# Patient Record
Sex: MALE | Race: WHITE | NOT HISPANIC OR LATINO | Employment: FULL TIME | URBAN - METROPOLITAN AREA
[De-identification: names, ages, dates, MRNs, and addresses within clinical notes are randomized per-mention and may not be internally consistent; named-entity substitution may affect disease eponyms.]

---

## 2017-04-12 ENCOUNTER — HISTORICAL (OUTPATIENT)
Dept: ADMINISTRATIVE | Facility: HOSPITAL | Age: 47
End: 2017-04-12

## 2017-05-23 ENCOUNTER — OFFICE VISIT (OUTPATIENT)
Dept: SURGERY | Facility: CLINIC | Age: 47
End: 2017-05-23
Payer: COMMERCIAL

## 2017-05-23 VITALS
SYSTOLIC BLOOD PRESSURE: 110 MMHG | WEIGHT: 166 LBS | HEIGHT: 69 IN | BODY MASS INDEX: 24.59 KG/M2 | DIASTOLIC BLOOD PRESSURE: 68 MMHG

## 2017-05-23 DIAGNOSIS — K40.90 RIGHT INGUINAL HERNIA: Primary | ICD-10-CM

## 2017-05-23 PROCEDURE — 99024 POSTOP FOLLOW-UP VISIT: CPT | Mod: ,,, | Performed by: SURGERY

## 2017-05-23 NOTE — LETTER
May 23, 2017      Aleena Mulligan MD  [address] [facility] [address]           [facility]  [address]  [address]  [place] [address]  Phone: [phone]  Fax: [phone]          Patient: Kevin Ca   MR Number: [id number]   YOB: 1970   Date of Visit: 5/23/2017       Dear Dr. Aleena Mulligan:    Thank you for referring Kevin Ca to me for evaluation. Attached you will find relevant portions of my assessment and plan of care.    If you have questions, please do not hesitate to call me. I look forward to following Kevin Ca along with you.    Sincerely,    Amos Easley III, MD    Enclosure  CC:  No Recipients    If you would like to receive this communication electronically, please contact [email or web address] or [phone] to request more information on [employer] Link access.    For providers and/or their staff who would like to refer a patient to [facility], please contact us through our one-stop-shop provider referral line, [facility], at [phone].    If you feel you have received this communication in error or would no longer like to receive these types of communications, please e-mail [email or web address]

## 2017-05-23 NOTE — PROGRESS NOTES
Subjective:       Patient ID: Kevin Ca is a 46 y.o. male.    Chief Complaint: Follow-up (SW/P Right Inguinal Hernia Repair 4/12/2017)      HPI:  Kevin Ca is here for post-op visit after right inguinal hernia repair on 4/12/17. Patient has no systemic complaints. Post operative pain is under control.  Tolerating diet, no nausea/vomiting.  Having normal bowel movements.        Objective:      Physical Exam   Constitutional: He is oriented to person, place, and time. He appears well-developed and well-nourished. He is cooperative. No distress.   Cardiovascular: Normal rate.    Pulmonary/Chest: Effort normal.   Abdominal: Soft. He exhibits no distension. There is no tenderness. There is no rebound and no guarding. No hernia (no evidence of recurrence ).   Neurological: He is alert and oriented to person, place, and time.   Skin:   Incisions are clean, dry and intact  There is no evidence of infection, hematoma or seroma    Nursing note and vitals reviewed.      Assessment/Plan:   Right inguinal hernia  Comments:  Doing well.  No evidence of recurrence.  RTC PRN.  May return to work with no restrictions.         Return if symptoms worsen or fail to improve.

## 2019-07-05 DIAGNOSIS — M25.561 RIGHT KNEE PAIN, UNSPECIFIED CHRONICITY: Primary | ICD-10-CM

## 2019-07-08 ENCOUNTER — OFFICE VISIT (OUTPATIENT)
Dept: ORTHOPEDICS | Facility: CLINIC | Age: 49
End: 2019-07-08
Payer: COMMERCIAL

## 2019-07-08 ENCOUNTER — HOSPITAL ENCOUNTER (OUTPATIENT)
Dept: RADIOLOGY | Facility: HOSPITAL | Age: 49
Discharge: HOME | End: 2019-07-08
Attending: ORTHOPAEDIC SURGERY
Payer: COMMERCIAL

## 2019-07-08 VITALS
HEART RATE: 88 BPM | SYSTOLIC BLOOD PRESSURE: 146 MMHG | WEIGHT: 166 LBS | DIASTOLIC BLOOD PRESSURE: 79 MMHG | HEIGHT: 69 IN | BODY MASS INDEX: 24.59 KG/M2

## 2019-07-08 DIAGNOSIS — M25.561 RIGHT KNEE PAIN, UNSPECIFIED CHRONICITY: ICD-10-CM

## 2019-07-08 DIAGNOSIS — S83.281A ACUTE LATERAL MENISCAL TEAR, RIGHT, INITIAL ENCOUNTER: Primary | ICD-10-CM

## 2019-07-08 PROCEDURE — 73562 XR KNEE ORTHO RIGHT WITH FLEXION: ICD-10-PCS | Mod: 26,59,LT, | Performed by: RADIOLOGY

## 2019-07-08 PROCEDURE — 73564 X-RAY EXAM KNEE 4 OR MORE: CPT | Mod: 26,RT,, | Performed by: RADIOLOGY

## 2019-07-08 PROCEDURE — 99244 PR OFFICE CONSULTATION,LEVEL IV: ICD-10-PCS | Mod: 57,S$GLB,, | Performed by: ORTHOPAEDIC SURGERY

## 2019-07-08 PROCEDURE — 73562 X-RAY EXAM OF KNEE 3: CPT | Mod: 26,59,LT, | Performed by: RADIOLOGY

## 2019-07-08 PROCEDURE — 73564 XR KNEE ORTHO RIGHT WITH FLEXION: ICD-10-PCS | Mod: 26,RT,, | Performed by: RADIOLOGY

## 2019-07-08 PROCEDURE — 73562 X-RAY EXAM OF KNEE 3: CPT | Mod: TC,PN,RT

## 2019-07-08 PROCEDURE — 99999 PR PBB SHADOW E&M-EST. PATIENT-LVL III: CPT | Mod: PBBFAC,,, | Performed by: ORTHOPAEDIC SURGERY

## 2019-07-08 PROCEDURE — 99244 OFF/OP CNSLTJ NEW/EST MOD 40: CPT | Mod: 57,S$GLB,, | Performed by: ORTHOPAEDIC SURGERY

## 2019-07-08 PROCEDURE — 99999 PR PBB SHADOW E&M-EST. PATIENT-LVL III: ICD-10-PCS | Mod: PBBFAC,,, | Performed by: ORTHOPAEDIC SURGERY

## 2019-07-08 NOTE — LETTER
July 8, 2019      Elliott Robert MD  [address]  [address]  [place] [address]           [facility]  [place] [address]  Phone: [phone]          Patient: Kevin Ca   MR Number: [id number]   YOB: 1970   Date of Visit: 7/8/2019       Dear Dr. Elliott Robert:    Thank you for referring Kevin Ca to me for evaluation. Attached you will find relevant portions of my assessment and plan of care.    If you have questions, please do not hesitate to call me. I look forward to following Kevin Ca along with you.    Sincerely,    Ganesh Berry MD    Enclosure  CC:  No Recipients    If you would like to receive this communication electronically, please contact [email or web address] or [phone] to request more information on [email or web address] Link access.    For providers and/or their staff who would like to refer a patient to [facility], please contact us through our one-stop-shop provider referral line, [facility], at [phone].    If you feel you have received this communication in error or would no longer like to receive these types of communications, please e-mail [email or web address]

## 2019-07-08 NOTE — H&P (VIEW-ONLY)
History reviewed. No pertinent past medical history.    Past Surgical History:   Procedure Laterality Date    HAND TENDON SURGERY Left 1992    HERNIA REPAIR Right 04/12/2017    with mesh       No current outpatient medications on file.     No current facility-administered medications for this visit.        Review of patient's allergies indicates:  No Known Allergies    Family History   Problem Relation Age of Onset    Cancer Maternal Grandfather        Social History     Socioeconomic History    Marital status:      Spouse name: Not on file    Number of children: Not on file    Years of education: Not on file    Highest education level: Not on file   Occupational History    Not on file   Social Needs    Financial resource strain: Not on file    Food insecurity:     Worry: Not on file     Inability: Not on file    Transportation needs:     Medical: Not on file     Non-medical: Not on file   Tobacco Use    Smoking status: Never Smoker   Substance and Sexual Activity    Alcohol use: No    Drug use: No    Sexual activity: Not on file   Lifestyle    Physical activity:     Days per week: Not on file     Minutes per session: Not on file    Stress: Not on file   Relationships    Social connections:     Talks on phone: Not on file     Gets together: Not on file     Attends Protestant service: Not on file     Active member of club or organization: Not on file     Attends meetings of clubs or organizations: Not on file     Relationship status: Not on file   Other Topics Concern    Not on file   Social History Narrative    Not on file       Chief Complaint:   Chief Complaint   Patient presents with    Knee Pain     right knee pain       History of present illness:  This is a 48-year-old male seen in consultation for Dr. Robert.  Patient is a  and has had right lateral knee pain since January.  Does not recall a specific injury.  Symptoms are stable in moderate.  Pain with squatting and  twisting.  Knee catches and feels like it will give way at times. He had an MRI done which showed a lateral meniscal tear consistent with his symptoms.      Review of Systems:    Constitution: Negative for chills, fever, and sweats.  Negative for unexplained weight loss.    HENT:  Negative for headaches and blurry vision.    Cardiovascular:Negative for chest pain or irregular heart beat. Negative for hypertension.    Respiratory:  Negative for cough and shortness of breath.    Gastrointestinal: Negative for abdominal pain, heartburn, melena, nausea, and vomitting.    Genitourinary:  Negative bladder incontinence and dysuria.    Musculoskeletal:  See HPI    Neurological: Negative for numbness.    Psychiatric/Behavioral: Negative for depression.  The patient is not nervous/anxious.      Endocrine: Negative for polyuria    Hematologic/Lymphatic: Negative for bleeding problem.  Does not bruise/bleed easily.    Skin: Negative for poor would healing and rash      Physical Examination:    Vital Signs:    Vitals:    07/08/19 1258   BP: (!) 146/79   Pulse: 88       Body mass index is 24.51 kg/m².    This a well-developed, well nourished patient in no acute distress.  They are alert and oriented and cooperative to examination.  Pt. walks without an antalgic gait.      Examination of the right knee shows no rashes or erythema. There are no masses ecchymosis or effusion. Patient has full range of motion from 0-130°. Patient is moderately tender to palpation over lateral joint line and nontender to palpation over the medial joint line. Patient has a - Lachman exam, - anterior drawer exam, and - posterior drawer exam.  Positive lateral Apley exam. Knee is stable to varus and valgus stress. 5 out of 5 motor strength. Palpable distal pulses. Intact light touch sensation. Negative Patellofemoral crepitus    Examination of the left knee shows no rashes or erythema. There are no masses ecchymosis or effusion. Patient has full range of  motion from 0-130°. Patient is nontender to palpation over lateral joint line and nontender to palpation over the medial joint line. Patient has a - Lachman exam, - anterior drawer exam, and - posterior drawer exam. - Jony's exam. Knee is stable to varus and valgus stress. 5 out of 5 motor strength. Palpable distal pulses. Intact light touch sensation. Negative Patellofemoral crepitus        X-rays:  X-rays of the right knee are ordered and reviewed which show very minimal arthritic change particular the right knee.    [facility] the right [facility]:  Lateral meniscal tear that is horizontal in nature of the posterior horn, body and anterior horn.  Small parameniscal cyst and moderate lateral compartment arthritis.     Assessment::  Right lateral meniscal tear    Plan:  I reviewed the findings with him today.  We talked about treatment options for his meniscal tear.  Plan is for right arthroscopic partial meniscectomy versus repair with PRP augmentation.  Risks, benefits, and alternatives to the procedure were explained to the patient including but not limited to damage to nerves, arteries, blood vessels, bones, tendons, ligaments, stiffness, instability, infection, DVT, PE, as well as general anesthetic complications including seizure, stroke, heart attack and even death. The patient understood these risks and wished to proceed and signed the informed consent.       This note was created using [employer] voice recognition software that occasionally misinterpreted phrases or words.    Consult note is delivered via Epic messaging service.

## 2019-07-08 NOTE — PROGRESS NOTES
History reviewed. No pertinent past medical history.    Past Surgical History:   Procedure Laterality Date    HAND TENDON SURGERY Left 1992    HERNIA REPAIR Right 04/12/2017    with mesh       No current outpatient medications on file.     No current facility-administered medications for this visit.        Review of patient's allergies indicates:  No Known Allergies    Family History   Problem Relation Age of Onset    Cancer Maternal Grandfather        Social History     Socioeconomic History    Marital status:      Spouse name: Not on file    Number of children: Not on file    Years of education: Not on file    Highest education level: Not on file   Occupational History    Not on file   Social Needs    Financial resource strain: Not on file    Food insecurity:     Worry: Not on file     Inability: Not on file    Transportation needs:     Medical: Not on file     Non-medical: Not on file   Tobacco Use    Smoking status: Never Smoker   Substance and Sexual Activity    Alcohol use: No    Drug use: No    Sexual activity: Not on file   Lifestyle    Physical activity:     Days per week: Not on file     Minutes per session: Not on file    Stress: Not on file   Relationships    Social connections:     Talks on phone: Not on file     Gets together: Not on file     Attends Islam service: Not on file     Active member of club or organization: Not on file     Attends meetings of clubs or organizations: Not on file     Relationship status: Not on file   Other Topics Concern    Not on file   Social History Narrative    Not on file       Chief Complaint:   Chief Complaint   Patient presents with    Knee Pain     right knee pain       History of present illness:  This is a 48-year-old male seen in consultation for Dr. Robert.  Patient is a  and has had right lateral knee pain since January.  Does not recall a specific injury.  Symptoms are stable in moderate.  Pain with squatting and  twisting.  Knee catches and feels like it will give way at times. He had an MRI done which showed a lateral meniscal tear consistent with his symptoms.      Review of Systems:    Constitution: Negative for chills, fever, and sweats.  Negative for unexplained weight loss.    HENT:  Negative for headaches and blurry vision.    Cardiovascular:Negative for chest pain or irregular heart beat. Negative for hypertension.    Respiratory:  Negative for cough and shortness of breath.    Gastrointestinal: Negative for abdominal pain, heartburn, melena, nausea, and vomitting.    Genitourinary:  Negative bladder incontinence and dysuria.    Musculoskeletal:  See HPI    Neurological: Negative for numbness.    Psychiatric/Behavioral: Negative for depression.  The patient is not nervous/anxious.      Endocrine: Negative for polyuria    Hematologic/Lymphatic: Negative for bleeding problem.  Does not bruise/bleed easily.    Skin: Negative for poor would healing and rash      Physical Examination:    Vital Signs:    Vitals:    07/08/19 1258   BP: (!) 146/79   Pulse: 88       Body mass index is 24.51 kg/m².    This a well-developed, well nourished patient in no acute distress.  They are alert and oriented and cooperative to examination.  Pt. walks without an antalgic gait.      Examination of the right knee shows no rashes or erythema. There are no masses ecchymosis or effusion. Patient has full range of motion from 0-130°. Patient is moderately tender to palpation over lateral joint line and nontender to palpation over the medial joint line. Patient has a - Lachman exam, - anterior drawer exam, and - posterior drawer exam.  Positive lateral Apley exam. Knee is stable to varus and valgus stress. 5 out of 5 motor strength. Palpable distal pulses. Intact light touch sensation. Negative Patellofemoral crepitus    Examination of the left knee shows no rashes or erythema. There are no masses ecchymosis or effusion. Patient has full range of  motion from 0-130°. Patient is nontender to palpation over lateral joint line and nontender to palpation over the medial joint line. Patient has a - Lachman exam, - anterior drawer exam, and - posterior drawer exam. - Jony's exam. Knee is stable to varus and valgus stress. 5 out of 5 motor strength. Palpable distal pulses. Intact light touch sensation. Negative Patellofemoral crepitus        X-rays:  X-rays of the right knee are ordered and reviewed which show very minimal arthritic change particular the right knee.    [facility] of the right [facility] from [facility]:  Lateral meniscal tear that is horizontal in nature of the posterior horn, body and anterior horn.  Small parameniscal cyst and moderate lateral compartment arthritis.     Assessment::  Right lateral meniscal tear    Plan:  I reviewed the findings with him today.  We talked about treatment options for his meniscal tear.  Plan is for right arthroscopic partial meniscectomy versus repair with PRP augmentation.  Risks, benefits, and alternatives to the procedure were explained to the patient including but not limited to damage to nerves, arteries, blood vessels, bones, tendons, ligaments, stiffness, instability, infection, DVT, PE, as well as general anesthetic complications including seizure, stroke, heart attack and even death. The patient understood these risks and wished to proceed and signed the informed consent.       This note was created using [employer] voice recognition software that occasionally misinterpreted phrases or words.    Consult note is delivered via Epic messaging service.

## 2019-07-09 RX ORDER — CEFAZOLIN SODIUM 2 G/50ML
2 SOLUTION INTRAVENOUS
Status: CANCELLED | OUTPATIENT
Start: 2019-07-09

## 2019-07-09 RX ORDER — MUPIROCIN 20 MG/G
OINTMENT TOPICAL
Status: CANCELLED | OUTPATIENT
Start: 2019-07-09

## 2019-07-12 ENCOUNTER — HOSPITAL ENCOUNTER (OUTPATIENT)
Dept: PREADMISSION TESTING | Facility: HOSPITAL | Age: 49
Discharge: HOME | End: 2019-07-12
Attending: ORTHOPAEDIC SURGERY
Payer: COMMERCIAL

## 2019-07-12 VITALS — WEIGHT: 210 LBS | HEIGHT: 69 IN | BODY MASS INDEX: 31.1 KG/M2

## 2019-07-12 DIAGNOSIS — S83.281A ACUTE LATERAL MENISCAL TEAR, RIGHT, INITIAL ENCOUNTER: Primary | ICD-10-CM

## 2019-07-12 DIAGNOSIS — Z01.818 PRE-OP EXAM: ICD-10-CM

## 2019-07-12 LAB
ANION GAP SERPL CALC-SCNC: 10 MMOL/L (ref 8–16)
BASOPHILS # BLD AUTO: 0.02 K/UL (ref 0–0.2)
BASOPHILS NFR BLD: 0.3 % (ref 0–1.9)
BUN SERPL-MCNC: 13 MG/DL (ref 6–20)
CALCIUM SERPL-MCNC: 10.4 MG/DL (ref 8.7–10.5)
CHLORIDE SERPL-SCNC: 104 MMOL/L (ref 95–110)
CO2 SERPL-SCNC: 29 MMOL/L (ref 23–29)
CREAT SERPL-MCNC: 1 MG/DL (ref 0.5–1.4)
DIFFERENTIAL METHOD: ABNORMAL
EOSINOPHIL # BLD AUTO: 0.1 K/UL (ref 0–0.5)
EOSINOPHIL NFR BLD: 1.3 % (ref 0–8)
ERYTHROCYTE [DISTWIDTH] IN BLOOD BY AUTOMATED COUNT: 12.5 % (ref 11.5–14.5)
EST. GFR  (AFRICAN AMERICAN): >60 ML/MIN/1.73 M^2
EST. GFR  (NON AFRICAN AMERICAN): >60 ML/MIN/1.73 M^2
GLUCOSE SERPL-MCNC: 93 MG/DL (ref 70–110)
HCT VFR BLD AUTO: 42.5 % (ref 40–54)
HGB BLD-MCNC: 13.8 G/DL (ref 14–18)
IMM GRANULOCYTES # BLD AUTO: 0.01 K/UL (ref 0–0.04)
LYMPHOCYTES # BLD AUTO: 2.3 K/UL (ref 1–4.8)
LYMPHOCYTES NFR BLD: 38 % (ref 18–48)
MCH RBC QN AUTO: 29.1 PG (ref 27–31)
MCHC RBC AUTO-ENTMCNC: 32.5 G/DL (ref 32–36)
MCV RBC AUTO: 90 FL (ref 82–98)
MONOCYTES # BLD AUTO: 0.6 K/UL (ref 0.3–1)
MONOCYTES NFR BLD: 9 % (ref 4–15)
NEUTROPHILS # BLD AUTO: 3.1 K/UL (ref 1.8–7.7)
NEUTROPHILS NFR BLD: 51.2 % (ref 38–73)
NRBC BLD-RTO: 0 /100 WBC
PLATELET # BLD AUTO: 218 K/UL (ref 150–350)
PMV BLD AUTO: 10.2 FL (ref 9.2–12.9)
POTASSIUM SERPL-SCNC: 5 MMOL/L (ref 3.5–5.1)
RBC # BLD AUTO: 4.74 M/UL (ref 4.6–6.2)
SODIUM SERPL-SCNC: 143 MMOL/L (ref 136–145)
WBC # BLD AUTO: 6.11 K/UL (ref 3.9–12.7)

## 2019-07-12 PROCEDURE — 80048 BASIC METABOLIC PNL TOTAL CA: CPT

## 2019-07-12 PROCEDURE — 93010 ELECTROCARDIOGRAM REPORT: CPT | Mod: ,,, | Performed by: INTERNAL MEDICINE

## 2019-07-12 PROCEDURE — 99900103 DSU ONLY-NO CHARGE-INITIAL HR (STAT)

## 2019-07-12 PROCEDURE — 99900104 DSU ONLY-NO CHARGE-EA ADD'L HR (STAT)

## 2019-07-12 PROCEDURE — 36415 COLL VENOUS BLD VENIPUNCTURE: CPT

## 2019-07-12 PROCEDURE — 85025 COMPLETE CBC W/AUTO DIFF WBC: CPT

## 2019-07-12 PROCEDURE — 93005 ELECTROCARDIOGRAM TRACING: CPT

## 2019-07-12 PROCEDURE — 93010 EKG 12-LEAD: ICD-10-PCS | Mod: ,,, | Performed by: INTERNAL MEDICINE

## 2019-07-12 NOTE — DISCHARGE INSTRUCTIONS
To confirm, Your doctor has instructed you that surgery is scheduled for: 7/16/19   ARTUR  [phone]    Please report to [facility] the morning of surgery. You must check-in and receive a wristband before going to your procedure.    Pre-Op will call the afternoon prior to surgery between 1:00 and 6:00 PM with the final arrival time.  Phone number: [phone]    PLEASE NOTE:  The surgery schedule has many variables which may affect the time of your surgery case.  Family members should be available if your surgery time changes.  Plan to be here the day of your procedure between 4-6 hours.    MEDICATIONS:  TAKE ONLY THESE MEDICATIONS WITH A SMALL SIP OF WATER THE MORNING OF YOUR PROCEDURE:  -0-    DO NOT TAKE THESE MEDICATIONS 5-7 DAYS PRIOR to your procedure or per your surgeon's request: ASPIRIN, ALEVE, ADVIL, IBUPROFEN, FISH OIL VITAMIN E, HERBALS  (May take Tylenol)    ONLY if you are prescribed any types of blood thinners such as:  Aspirin, Coumadin, Plavix, Pradaxa, Xarelto, Aggrenox, Effient, Eliquis, Savasya, Brilinta, or any other, ask your surgeon whether you should stop taking them and how long before surgery you should stop.  You may also need to verify with the prescribing physician if it is ok to stop your medication.      INSTRUCTIONS IMPORTANT!!  · Do not eat or drink anything between midnight and the time of your procedure- this includes gum, mints, and candy.  · Do not smoke or drink alcoholic beverages 24 hours prior to your procedure.  · Shower the night before AND the morning of your procedure with a Chlorhexidine wash such as Hibiclens or Dial antibacterial soap from the neck down.  Do not get it on your face or in your eyes.  You may use your own shampoo and face wash. This helps your skin to be as bacteria free as possible.    · If you wear contact lenses, dentures, hearing aids or glasses, bring a container to put them in during surgery and give to a  family member for safe keeping.  Please leave all jewelry, piercing's and valuables at home.   · DO NOT remove hair from the surgery site.  Do not shave the incision site unless you are given specific instructions to do so.    · ONLY if you have been diagnosed with sleep apnea please bring your C-PAP machine.  · ONLY if you wear home oxygen please bring your portable oxygen tank the day of your procedure.  · ONLY if you have a history of OPEN HEART SURGERY you will need a clearance from your Cardiologist per Anesthesia.      · ONLY for patients requiring bowel prep, written instructions will be given by your doctor's office.  · ONLY if you have a neuro stimulator, please bring the controller with you the morning of surgery  · ONLY if a type and screen test is needed before surgery, please return:  · If your doctor has scheduled you for an overnight stay, bring a small overnight bag with any personal items you need.  · Make arrangements in advance for transportation home by a responsible adult.  It is not safe to drive a vehicle during the 24 hours after anesthesia.      · Visiting hours are 10:00AM to 8:30PM.  For the safety of all patients, visitors under the age of 12 are not allowed above the first floor of the hospital.    · All [facility] facilities and properties are tobacco free.  Smoking is NOT allowed.       If you have any questions about these instructions, call Pre-Op Admit  Nursing at [phone] or the Pre-Op Day Surgery Unit at [phone].

## 2019-07-15 ENCOUNTER — ANESTHESIA EVENT (OUTPATIENT)
Dept: SURGERY | Facility: HOSPITAL | Age: 49
End: 2019-07-15
Payer: COMMERCIAL

## 2019-07-16 ENCOUNTER — ANESTHESIA (OUTPATIENT)
Dept: SURGERY | Facility: HOSPITAL | Age: 49
End: 2019-07-16
Payer: COMMERCIAL

## 2019-07-16 ENCOUNTER — HOSPITAL ENCOUNTER (OUTPATIENT)
Facility: HOSPITAL | Age: 49
Discharge: HOME | End: 2019-07-16
Attending: ORTHOPAEDIC SURGERY | Admitting: ORTHOPAEDIC SURGERY
Payer: COMMERCIAL

## 2019-07-16 VITALS
RESPIRATION RATE: 18 BRPM | DIASTOLIC BLOOD PRESSURE: 79 MMHG | WEIGHT: 210 LBS | OXYGEN SATURATION: 98 % | SYSTOLIC BLOOD PRESSURE: 120 MMHG | TEMPERATURE: 98 F | HEART RATE: 73 BPM | BODY MASS INDEX: 31.1 KG/M2 | HEIGHT: 69 IN

## 2019-07-16 DIAGNOSIS — S83.281A ACUTE LATERAL MENISCAL TEAR, RIGHT, INITIAL ENCOUNTER: ICD-10-CM

## 2019-07-16 PROCEDURE — 99900103 DSU ONLY-NO CHARGE-INITIAL HR (STAT): Performed by: ORTHOPAEDIC SURGERY

## 2019-07-16 PROCEDURE — D9220A PRA ANESTHESIA: Mod: CRNA,,, | Performed by: NURSE ANESTHETIST, CERTIFIED REGISTERED

## 2019-07-16 PROCEDURE — 71000015 HC POSTOP RECOV 1ST HR: Performed by: ORTHOPAEDIC SURGERY

## 2019-07-16 PROCEDURE — 29882 PR KNEE SCOPE,MED OR LAT MENIS REPAIR: ICD-10-PCS | Mod: RT,,, | Performed by: ORTHOPAEDIC SURGERY

## 2019-07-16 PROCEDURE — D9220A PRA ANESTHESIA: Mod: ANES,,, | Performed by: ANESTHESIOLOGY

## 2019-07-16 PROCEDURE — 25000003 PHARM REV CODE 250: Performed by: ANESTHESIOLOGY

## 2019-07-16 PROCEDURE — 63600175 PHARM REV CODE 636 W HCPCS: Performed by: ORTHOPAEDIC SURGERY

## 2019-07-16 PROCEDURE — 71000033 HC RECOVERY, INTIAL HOUR: Performed by: ORTHOPAEDIC SURGERY

## 2019-07-16 PROCEDURE — 27201423 OPTIME MED/SURG SUP & DEVICES STERILE SUPPLY: Performed by: ORTHOPAEDIC SURGERY

## 2019-07-16 PROCEDURE — 27200651 HC AIRWAY, LMA: Performed by: NURSE ANESTHETIST, CERTIFIED REGISTERED

## 2019-07-16 PROCEDURE — 63600175 PHARM REV CODE 636 W HCPCS: Performed by: ANESTHESIOLOGY

## 2019-07-16 PROCEDURE — 29882 ARTHRS KNE SRG MNISC RPR M/L: CPT | Mod: RT,,, | Performed by: ORTHOPAEDIC SURGERY

## 2019-07-16 PROCEDURE — 71000039 HC RECOVERY, EACH ADD'L HOUR: Performed by: ORTHOPAEDIC SURGERY

## 2019-07-16 PROCEDURE — 37000009 HC ANESTHESIA EA ADD 15 MINS: Performed by: ORTHOPAEDIC SURGERY

## 2019-07-16 PROCEDURE — 63600175 PHARM REV CODE 636 W HCPCS: Performed by: NURSE ANESTHETIST, CERTIFIED REGISTERED

## 2019-07-16 PROCEDURE — 37000008 HC ANESTHESIA 1ST 15 MINUTES: Performed by: ORTHOPAEDIC SURGERY

## 2019-07-16 PROCEDURE — 36000710: Performed by: ORTHOPAEDIC SURGERY

## 2019-07-16 PROCEDURE — D9220A PRA ANESTHESIA: ICD-10-PCS | Mod: ANES,,, | Performed by: ANESTHESIOLOGY

## 2019-07-16 PROCEDURE — 25000003 PHARM REV CODE 250: Performed by: ORTHOPAEDIC SURGERY

## 2019-07-16 PROCEDURE — 36000711: Performed by: ORTHOPAEDIC SURGERY

## 2019-07-16 PROCEDURE — 99900104 DSU ONLY-NO CHARGE-EA ADD'L HR (STAT): Performed by: ORTHOPAEDIC SURGERY

## 2019-07-16 PROCEDURE — D9220A PRA ANESTHESIA: ICD-10-PCS | Mod: CRNA,,, | Performed by: NURSE ANESTHETIST, CERTIFIED REGISTERED

## 2019-07-16 PROCEDURE — C1713 ANCHOR/SCREW BN/BN,TIS/BN: HCPCS | Performed by: ORTHOPAEDIC SURGERY

## 2019-07-16 DEVICE — SYS NOVOSTITCH PRO MENIS 2-0: Type: IMPLANTABLE DEVICE | Site: KNEE | Status: FUNCTIONAL

## 2019-07-16 DEVICE — CARTRIDGE NOVOSTITCH PLUS 2-0: Type: IMPLANTABLE DEVICE | Site: KNEE | Status: FUNCTIONAL

## 2019-07-16 RX ORDER — FENTANYL CITRATE 50 UG/ML
INJECTION, SOLUTION INTRAMUSCULAR; INTRAVENOUS
Status: DISCONTINUED | OUTPATIENT
Start: 2019-07-16 | End: 2019-07-16

## 2019-07-16 RX ORDER — LIDOCAINE HCL/PF 100 MG/5ML
SYRINGE (ML) INTRAVENOUS
Status: DISCONTINUED | OUTPATIENT
Start: 2019-07-16 | End: 2019-07-16

## 2019-07-16 RX ORDER — DEXAMETHASONE SODIUM PHOSPHATE 4 MG/ML
INJECTION, SOLUTION INTRA-ARTICULAR; INTRALESIONAL; INTRAMUSCULAR; INTRAVENOUS; SOFT TISSUE
Status: DISCONTINUED | OUTPATIENT
Start: 2019-07-16 | End: 2019-07-16

## 2019-07-16 RX ORDER — OXYCODONE HYDROCHLORIDE 5 MG/1
5 TABLET ORAL ONCE AS NEEDED
Status: COMPLETED | OUTPATIENT
Start: 2019-07-16 | End: 2019-07-16

## 2019-07-16 RX ORDER — MIDAZOLAM HYDROCHLORIDE 1 MG/ML
INJECTION, SOLUTION INTRAMUSCULAR; INTRAVENOUS
Status: DISCONTINUED | OUTPATIENT
Start: 2019-07-16 | End: 2019-07-16

## 2019-07-16 RX ORDER — ONDANSETRON HYDROCHLORIDE 2 MG/ML
INJECTION, SOLUTION INTRAMUSCULAR; INTRAVENOUS
Status: DISCONTINUED | OUTPATIENT
Start: 2019-07-16 | End: 2019-07-16

## 2019-07-16 RX ORDER — HYDROCODONE BITARTRATE AND ACETAMINOPHEN 5; 325 MG/1; MG/1
1 TABLET ORAL EVERY 6 HOURS PRN
Qty: 20 TABLET | Refills: 0 | Status: SHIPPED | OUTPATIENT
Start: 2019-07-16 | End: 2019-07-26

## 2019-07-16 RX ORDER — LIDOCAINE HYDROCHLORIDE 10 MG/ML
1 INJECTION, SOLUTION EPIDURAL; INFILTRATION; INTRACAUDAL; PERINEURAL ONCE
Status: DISCONTINUED | OUTPATIENT
Start: 2019-07-16 | End: 2019-07-16 | Stop reason: HOSPADM

## 2019-07-16 RX ORDER — OXYCODONE HYDROCHLORIDE 5 MG/1
5 TABLET ORAL
Status: DISCONTINUED | OUTPATIENT
Start: 2019-07-16 | End: 2019-07-16 | Stop reason: HOSPADM

## 2019-07-16 RX ORDER — SODIUM CHLORIDE, SODIUM LACTATE, POTASSIUM CHLORIDE, CALCIUM CHLORIDE 600; 310; 30; 20 MG/100ML; MG/100ML; MG/100ML; MG/100ML
INJECTION, SOLUTION INTRAVENOUS CONTINUOUS
Status: DISCONTINUED | OUTPATIENT
Start: 2019-07-16 | End: 2019-07-16 | Stop reason: HOSPADM

## 2019-07-16 RX ORDER — HYDROMORPHONE HYDROCHLORIDE 2 MG/ML
0.2 INJECTION, SOLUTION INTRAMUSCULAR; INTRAVENOUS; SUBCUTANEOUS EVERY 5 MIN PRN
Status: COMPLETED | OUTPATIENT
Start: 2019-07-16 | End: 2019-07-16

## 2019-07-16 RX ORDER — FENTANYL CITRATE 50 UG/ML
25 INJECTION, SOLUTION INTRAMUSCULAR; INTRAVENOUS EVERY 5 MIN PRN
Status: COMPLETED | OUTPATIENT
Start: 2019-07-16 | End: 2019-07-16

## 2019-07-16 RX ORDER — PROPOFOL 10 MG/ML
VIAL (ML) INTRAVENOUS
Status: DISCONTINUED | OUTPATIENT
Start: 2019-07-16 | End: 2019-07-16

## 2019-07-16 RX ORDER — BUPIVACAINE HCL/EPINEPHRINE 0.25-.0005
VIAL (ML) INJECTION
Status: DISCONTINUED | OUTPATIENT
Start: 2019-07-16 | End: 2019-07-16 | Stop reason: HOSPADM

## 2019-07-16 RX ORDER — PHENYLEPHRINE HYDROCHLORIDE 10 MG/ML
INJECTION INTRAVENOUS
Status: DISCONTINUED | OUTPATIENT
Start: 2019-07-16 | End: 2019-07-16

## 2019-07-16 RX ORDER — MUPIROCIN 20 MG/G
OINTMENT TOPICAL
Status: DISCONTINUED | OUTPATIENT
Start: 2019-07-16 | End: 2019-07-16 | Stop reason: HOSPADM

## 2019-07-16 RX ORDER — CEFAZOLIN SODIUM 2 G/50ML
2 SOLUTION INTRAVENOUS
Status: COMPLETED | OUTPATIENT
Start: 2019-07-16 | End: 2019-07-16

## 2019-07-16 RX ORDER — KETOROLAC TROMETHAMINE 30 MG/ML
30 INJECTION, SOLUTION INTRAMUSCULAR; INTRAVENOUS ONCE
Status: COMPLETED | OUTPATIENT
Start: 2019-07-16 | End: 2019-07-16

## 2019-07-16 RX ORDER — ACETAMINOPHEN 10 MG/ML
INJECTION, SOLUTION INTRAVENOUS
Status: DISCONTINUED | OUTPATIENT
Start: 2019-07-16 | End: 2019-07-16

## 2019-07-16 RX ADMIN — FENTANYL CITRATE 25 MCG: 50 INJECTION INTRAMUSCULAR; INTRAVENOUS at 08:07

## 2019-07-16 RX ADMIN — ACETAMINOPHEN 1000 MG: 10 INJECTION, SOLUTION INTRAVENOUS at 07:07

## 2019-07-16 RX ADMIN — LIDOCAINE HYDROCHLORIDE 100 MG: 20 INJECTION, SOLUTION INTRAVENOUS at 07:07

## 2019-07-16 RX ADMIN — HYDROMORPHONE HYDROCHLORIDE 0.2 MG: 2 INJECTION, SOLUTION INTRAMUSCULAR; INTRAVENOUS; SUBCUTANEOUS at 08:07

## 2019-07-16 RX ADMIN — FENTANYL CITRATE 50 MCG: 50 INJECTION, SOLUTION INTRAMUSCULAR; INTRAVENOUS at 07:07

## 2019-07-16 RX ADMIN — HYDROMORPHONE HYDROCHLORIDE 0.2 MG: 2 INJECTION, SOLUTION INTRAMUSCULAR; INTRAVENOUS; SUBCUTANEOUS at 09:07

## 2019-07-16 RX ADMIN — CEFAZOLIN SODIUM 2 G: 2 SOLUTION INTRAVENOUS at 07:07

## 2019-07-16 RX ADMIN — SODIUM CHLORIDE, SODIUM LACTATE, POTASSIUM CHLORIDE, AND CALCIUM CHLORIDE: .6; .31; .03; .02 INJECTION, SOLUTION INTRAVENOUS at 06:07

## 2019-07-16 RX ADMIN — PHENYLEPHRINE HYDROCHLORIDE 100 MCG: 10 INJECTION INTRAVENOUS at 07:07

## 2019-07-16 RX ADMIN — PROPOFOL 150 MG: 10 INJECTION, EMULSION INTRAVENOUS at 07:07

## 2019-07-16 RX ADMIN — SODIUM CHLORIDE, SODIUM LACTATE, POTASSIUM CHLORIDE, AND CALCIUM CHLORIDE: .6; .31; .03; .02 INJECTION, SOLUTION INTRAVENOUS at 08:07

## 2019-07-16 RX ADMIN — KETOROLAC TROMETHAMINE 30 MG: 30 INJECTION, SOLUTION INTRAMUSCULAR at 08:07

## 2019-07-16 RX ADMIN — DEXAMETHASONE SODIUM PHOSPHATE 4 MG: 4 INJECTION, SOLUTION INTRAMUSCULAR; INTRAVENOUS at 07:07

## 2019-07-16 RX ADMIN — MIDAZOLAM 2 MG: 1 INJECTION INTRAMUSCULAR; INTRAVENOUS at 07:07

## 2019-07-16 RX ADMIN — ONDANSETRON 4 MG: 2 INJECTION, SOLUTION INTRAMUSCULAR; INTRAVENOUS at 07:07

## 2019-07-16 RX ADMIN — OXYCODONE HYDROCHLORIDE 5 MG: 5 TABLET ORAL at 09:07

## 2019-07-16 RX ADMIN — OXYCODONE HYDROCHLORIDE 5 MG: 5 TABLET ORAL at 08:07

## 2019-07-16 NOTE — ANESTHESIA PREPROCEDURE EVALUATION
07/16/2019  Kevin Ca is a 48 y.o., male.    Anesthesia Evaluation    I have reviewed the Patient Summary Reports.    I have reviewed the Nursing Notes.      Review of Systems  Anesthesia Hx:  No problems with previous Anesthesia    Cardiovascular:  Cardiovascular Normal     Pulmonary:  Pulmonary Normal        Physical Exam  General:  Well nourished    Airway/Jaw/Neck:  Airway Findings: Mouth Opening: Normal Tongue: Normal  General Airway Assessment: Adult  Mallampati: III  TM Distance: Normal, at least 6 cm  Jaw/Neck Findings:  Neck ROM: Normal ROM     Eyes/Ears/Nose:  Eyes/Ears/Nose Findings:    Dental:  Dental Findings: In tact   Chest/Lungs:  Chest/Lungs Findings: Normal Respiratory Rate     Heart/Vascular:  Heart Findings: Rate: Normal  Rhythm: Regular Rhythm        Mental Status:  Mental Status Findings:  Cooperative, Alert and Oriented         Anesthesia Plan  Type of Anesthesia, risks & benefits discussed:  Anesthesia Type:  general  Patient's Preference: General  Intra-op Monitoring Plan: standard ASA monitors  Intra-op Monitoring Plan Comments:   Post Op Pain Control Plan: multimodal analgesia and per primary service following discharge from PACU  Post Op Pain Control Plan Comments:   Induction:   IV  Beta Blocker:  Patient is not currently on a Beta-Blocker (No further documentation required).       Informed Consent: Patient understands risks and agrees with Anesthesia plan.  Questions answered. Anesthesia consent signed with patient.  ASA Score: 1     Day of Surgery Review of History & Physical:    H&P update referred to the surgeon.         Ready For Surgery From Anesthesia Perspective.

## 2019-07-16 NOTE — DISCHARGE INSTRUCTIONS
1.Diet: Ice chips, clear liquids, and then diet as tolerated. Drink plenty of liquids.  2.Ice the area at least three times a day (20 minutes per session).  3.Elevate the extremity above the level of the heart to help reduce swelling.  4.Pain medication can be taken every four to six hours as needed. It is helpful to take pain medication prior to physical therapy.  5.Any activity that requires precise thinking or accuracy should be avoided for a minimum of 72 hours after surgery and while on narcotic pain medication. This includes operating machinery and/or driving a vehicle.  6.All sutures/staples will be removed approximately 14 days from the time of surgery. Leave steri-strips (skin tapes) in place until sutures are removed.  7. If skin glue is used instead of stitches, do not apply ointments or solutions to the incision. Keep the incision dry. The skin glue will peel off in 3-4 weeks.  8. Change dressing on the first post-op day. Use gauze for the first 3 days, then start using Band-Aids over the incision sites. Use an Ace Wrap for 2 weeks unless instructed otherwise.  9. All casts, splints, braces, slings, crutches, abduction pillows, etc... Are to be worn as instructed.Use crutchesas ordered  10. Keep the incision dry for 10-14 days. A waterproof dressing (purchase at [employer], [employer], etc) can be used to shower. No bath, pool, hot tub until instructed.  11. Call [phone] with any questions or concerns.      General Information:    1.  Do not drink alcoholic beverages including beer for 24 hours or as long as you are on pain medication..  2.  Do not drive a motor vehicle, operate machinery or power tools, or signs legal papers for 24 hours or as long as you are on pain medication.   3.  You may experience light-headedness, dizziness, and sleepiness following surgery. Please do not stay alone. A responsible adult should be with you for this 24 hour period.  4.  Go home and rest.    5. Progress slowly to a normal  diet unless instructed.  Otherwise, begin with liquids such as soft drinks, then soup and crackers working up to solid foods. Drink plenty of nonalcoholic fluids.  6.  Certain anesthetics and pain medications produce nausea and vomiting in certain       individuals. If nausea becomes a problem at home, call you doctor.    7. A nurse will be calling you sometime after surgery. Do not be alarmed. This is our way of finding out how you are doing.    8. Several times every hour while you are awake, take 2-3 deep breaths and cough. If you had stomach surgery hold a pillow or rolled towel firmly against your stomach before you cough. This will help with any pain the cough might cause.  9. Several times every hour while you are awake, pump and flex your feet 5-6 times and do foot circles. This will help prevent blood clots.    10.Call your doctor for severe pain, bleeding, fever, or signs or symptoms of infection (pain, swelling, redness, foul odor, drainage).    Post op instructions for prevention of DVT  What is deep vein thrombosis?  Deep vein thrombosis (DVT) is the medical term for blood clots in the deep veins of the leg.  These blood clots can be dangerous.  A DVT can block a blood vessel and keep blood from getting where it needs to go.  Another problem is that the clot can travel to other parts of the body such as the lungs.  A clot that travels to the lungs is called a pulmonary embolus (PE) and can cause serious problems with breathing which can lead to death.  Am I at risk for DVT/PE?  If you are not very active, you are at risk of DVT.  Anyone confined to bed, sitting for long periods of time, recovering from surgery, etc. increases the risk of DVT.  Other risk factors are cancer diagnosis, certain medications, estrogen replacement in any form,older age, obesity, pregnancy, smoking, history of clotting disorders, and dehydration.  How will I know if I have a DVT?   Swelling in the lower leg   Pain   Warmth,  redness, hardness or bulging of the vein  If you have any of these symptoms, call your doctors office right away.  Some people will not have any symptoms until the clot moves to the lungs.  What are the symptoms of a PE?   Panting, shortness of breath, or trouble breathing   Sharp, knife-like chest pain when you breathe   Coughing or coughing up blood   Rapid heartbeat  If you have any of these symptoms or get worse quickly, call 911 for emergency treatment.  How can I prevent a DVT?   Avoid long periods of inactivity and dont cross your legs--get up and walk around every hour or so.   Stay active--walking after surgery is highly encouraged.  This means you should get out of the house and walk in the neighborhood.  Going up and down stairs will not impair healing (unless advised against such activity by your doctor).     Drink plenty of noncaffeinated, nonalcoholic fluids each day to prevent dehydration.   Wear special support stockings, if they have been advised by your doctor.   If you travel, stop at least once an hour and walk around.   Avoid smoking (assistance with stopping is available through your healthcare provider)  Always notify your doctor if you are not able to follow the post operative instructions that are given to you at the time of discharge.  It may be necessary to prescribe one of the medications available to prevent DVT.    Discharge Instructions: After Your Surgery/Procedure  Youve just had surgery. During surgery you were given medicine called anesthesia to keep you relaxed and free of pain. After surgery you may have some pain or nausea. This is common. Here are some tips for feeling better and getting well after surgery.     Stay on schedule with your medication.   Going home  Your doctor or nurse will show you how to take care of yourself when you go home. He or she will also answer your questions. Have an adult family member or friend drive you home.      For your safety we  "recommend these precaution for the first 24 hours after your procedure:  · Do not drive or use heavy equipment.  · Do not make important decisions or sign legal papers.  · Do not drink alcohol.  · Have someone stay with you, if needed. He or she can watch for problems and help keep you safe.  · Your concentration, balance, coordination, and judgement may be impaired for many hours after anesthesia.  Use caution when ambulating or standing up.     · You may feel weak and "washed out" after anesthesia and surgery.      Subtle residual effects of general anesthesia or sedation with regional / local anesthesia can last more than 24 hours.  Rest for the remainder of the day or longer if your Doctor/Surgeon has advised you to do so.  Although you may feel normal within the first 24 hours, your reflexes and mental ability may be impaired without you realizing it.  You may feel dizzy, lightheaded or sleepy for 24 hours or longer.      Be sure to go to all follow-up visits with your doctor. And rest after your surgery for as long as your doctor tells you to.  Coping with pain  If you have pain after surgery, pain medicine will help you feel better. Take it as told, before pain becomes severe. Also, ask your doctor or pharmacist about other ways to control pain. This might be with heat, ice, or relaxation. And follow any other instructions your surgeon or nurse gives you.  Tips for taking pain medicine  To get the best relief possible, remember these points:  · Pain medicines can upset your stomach. Taking them with a little food may help.  · Most pain relievers taken by mouth need at least 20 to 30 minutes to start to work.  · Taking medicine on a schedule can help you remember to take it. Try to time your medicine so that you can take it before starting an activity. This might be before you get dressed, go for a walk, or sit down for dinner.  · Constipation is a common side effect of pain medicines. Call your doctor before " taking any medicines such as laxatives or stool softeners to help ease constipation. Also ask if you should skip any foods. Drinking lots of fluids and eating foods such as fruits and vegetables that are high in fiber can also help. Remember, do not take laxatives unless your surgeon has prescribed them.  · Drinking alcohol and taking pain medicine can cause dizziness and slow your breathing. It can even be deadly. Do not drink alcohol while taking pain medicine.  · Pain medicine can make you react more slowly to things. Do not drive or run machinery while taking pain medicine.  Your health care provider may tell you to take acetaminophen to help ease your pain. Ask him or her how much you are supposed to take each day. Acetaminophen or other pain relievers may interact with your prescription medicines or other over-the-counter (OTC) drugs. Some prescription medicines have acetaminophen and other ingredients. Using both prescription and OTC acetaminophen for pain can cause you to overdose. Read the labels on your OTC medicines with care. This will help you to clearly know the list of ingredients, how much to take, and any warnings. It may also help you not take too much acetaminophen. If you have questions or do not understand the information, ask your pharmacist or health care provider to explain it to you before you take the OTC medicine.  Managing nausea  Some people have an upset stomach after surgery. This is often because of anesthesia, pain, or pain medicine, or the stress of surgery. These tips will help you handle nausea and eat healthy foods as you get better. If you were on a special food plan before surgery, ask your doctor if you should follow it while you get better. These tips may help:  · Do not push yourself to eat. Your body will tell you when to eat and how much.  · Start off with clear liquids and soup. They are easier to digest.  · Next try semi-solid foods, such as mashed potatoes, applesauce,  and gelatin, as you feel ready.  · Slowly move to solid foods. Dont eat fatty, rich, or spicy foods at first.  · Do not force yourself to have 3 large meals a day. Instead eat smaller amounts more often.  · Take pain medicines with a small amount of solid food, such as crackers or toast, to avoid nausea.     Call your surgeon if  · You still have pain an hour after taking medicine. The medicine may not be strong enough.  · You feel too sleepy, dizzy, or groggy. The medicine may be too strong.  · You have side effects like nausea, vomiting, or skin changes, such as rash, itching, or hives.       If you have obstructive sleep apnea  You were given anesthesia medicine during surgery to keep you comfortable and free of pain. After surgery, you may have more apnea spells because of this medicine and other medicines you were given. The spells may last longer than usual.   At home:  · Keep using the continuous positive airway pressure (CPAP) device when you sleep. Unless your health care provider tells you not to, use it when you sleep, day or night. CPAP is a common device used to treat obstructive sleep apnea.  · Talk with your provider before taking any pain medicine, muscle relaxants, or sedatives. Your provider will tell you about the possible dangers of taking these medicines.  © 1961-8656 [employer]. [address] [place] [address], [place] [address]. All rights reserved. This information is not intended as a substitute for professional medical care. Always follow your healthcare professional's instructions.

## 2019-07-16 NOTE — OP NOTE
[facility]  Orthopedic Surgery  Operative Note    SUMMARY     Date of Procedure: 7/16/2019     Procedure: Procedure(s) (LRB):  ARTHROSCOPY, KNEE (Right)  REPAIR, Lateral MENISCUS, KNEE (Right)   With 3ml PRP augmentation from the Arthrex Helio system    Surgeon(s) and Role:     * Ganesh Berry MD - Primary    Assistant: Corky White    Pre-Operative Diagnosis: Acute lateral meniscal tear, right, initial encounter [S83.281A]    Post-Operative Diagnosis: Post-Op Diagnosis Codes:     * Acute lateral meniscal tear, right, initial encounter [S83.281A]    Anesthesia: General    Diagnostic arthroscopy findings: Diagnostic arthroscopy findings, the patient's medial compartment was thoroughly examined. The patient had no cartilage wear and no distinct meniscal tear. ACL and PCL were both intact with   good tension. Lateral compartment showed grade 2/3 wear of the tibial plateau as well as horizontal tearing of the posterior horn body junction extending to the anterior horn body junction. In the patellofemoral joint, the patient had good central tracking without tilt or chondromalacia    Complications: No    Estimated Blood Loss (EBL): 10ml    Tourniquet Time: 25min at 300mmHg           Implants: * No implants in log *    Specimens:   Specimen (12h ago, onward)    None                  Condition: Good    Disposition: PACU - hemodynamically stable.    Attestation: I was present and scrubbed for the entire procedure.    INDICATIONS FOR THE PROCEDURE:  48-year-old male with a history of right knee pain.  Patient had an MRI which showed a horizontal tear of the lateral meniscus.  After discussion with the patient, the patient wished to proceed with the procedure listed above.    PROCEDURE IN DETAIL: Risks, benefits and alternatives of the procedure were   explained to the patient including, but not limited to damage to nerves,   arteries, blood vessels. Also explained risk of infection, DVT, PE,  continued pain due to arthritis,  as well as   anesthetic complications including seizure, stroke, heart attack and death. They  understood this and signed informed consent. The patient's Right knee was marked prior to coming to the Operating Room. Once there a formal   timeout was done in which correct patient, procedure and op site were all   correctly identified and confirmed by the entire operating team. Ancef 2 g was   given prior to surgical incision. Laryngeal mask anesthesia was induced. The   patient's Right lower extremity was prepped and draped in normal sterile fashion.   Leg was then exsanguinated with a tourniquet and tourniquet was inflated up   300 mmHg. Standard inferior lateral portal was then made. A spinal needle was   used to localize an inferior medial portal and this was made under direct   arthroscopic visualization. Diagnostic arthroscopy was then performed with the   findings listed above. Shaver was used to remove redundant fat pad and   Synovium within the notch.  Shaver was used to clean up the meniscal tear.  Some of the inferior leaflet of the body was debrided with the shaver.  We then used the shaver as well as a meniscal rasp to debride the tear within the horizontal component. We then made a more medial all medial portal offer little better angle on the tear and used Ceterix device to pass a hay bale stitch around the body posterior horn junction.  This was then arthroscopically tied and cut. We then used the [employer] outside in meniscal kit with spinal needles to place another hay bale stitch from underneath the meniscus to the top of the meniscus and then passed a 0 PDS and tied this along the lateral capsule.  We then placed the spinal needle into the meniscal tissue. We then injected the PRP into the meniscal tissue around the tear and then on top of the tear. We did all this well the water was turned off so that it would not dilute out. The knee was then capped and 90°  of flexion for a few minutes to let the clot stick.    Proceeded with closing. All   excess water was removed from the knee joint. Portals were closed using   nylons. Portal was then injected with 0.25% Marcaine with epinephrine. Sterile   dressing was then applied. They were then extubated and awakened and transferred   from the Operating Room to the Recovery Room in stable condition.     Postop course is for an arthroscopic lateral meniscal repair.

## 2019-07-16 NOTE — INTERVAL H&P NOTE
The patient has been examined and the H&P has been reviewed:    I concur with the findings and no changes have occurred since H&P was written.    Anesthesia/Surgery risks, benefits and alternative options discussed and understood by patient/family.          Active Hospital Problems    Diagnosis  POA    Acute lateral meniscal tear, right, initial encounter [S86.675A]  Yes      Resolved Hospital Problems   No resolved problems to display.

## 2019-07-16 NOTE — DISCHARGE SUMMARY
"[facility]  Discharge Note  Short Stay    Admit Date: 7/16/2019    Discharge Date and Time: 7/16/2019    Attending Physician: Ganesh Berry MD     Discharge Provider: Ganesh Berry    Diagnoses:  Active Hospital Problems    Diagnosis  POA    *Acute lateral meniscal tear, right, initial encounter [S83.281A]  Yes      Resolved Hospital Problems   No resolved problems to display.       Discharged Condition: good    Hospital Course: Patient was admitted for an outpatient procedure and tolerated the procedure well with no complications.    Final Diagnoses: Same as principal problem.    Disposition: Home or Self Care    Follow up/Patient Instructions:    Medications:  Reconciled Home Medications:      Medication List      START taking these medications    HYDROcodone-acetaminophen 5-325 mg per tablet  Commonly known as:  NORCO  Take 1 tablet by mouth every 6 (six) hours as needed for Pain.          Discharge Procedure Orders   CRUTCHES FOR HOME USE     Order Specific Question Answer Comments   Type: Axillary    Height: 5' 9" (1.753 m)    Weight: 95.3 kg (210 lb)    Length of need (1-99 months): 1      Remove dressing in 48 hours     Follow-up Information     Ganesh Berry MD In 2 weeks.    Specialties:  Sports Medicine, Orthopedic Surgery  Contact information:  [address] [place] [address] [facility] [address]  [phone]                   Discharge Procedure Orders (must include Diet, Follow-up, Activity):   Discharge Procedure Orders (must include Diet, Follow-up, Activity)   CRUTCHES FOR HOME USE     Order Specific Question Answer Comments   Type: Axillary    Height: 5' 9" (1.753 m)    Weight: 95.3 kg (210 lb)    Length of need (1-99 months): 1      Remove dressing in 48 hours        "

## 2019-07-16 NOTE — ANESTHESIA POSTPROCEDURE EVALUATION
Anesthesia Post Evaluation    Patient: Kevin Ca    Procedure(s) Performed: Procedure(s) (LRB):  ARTHROSCOPY, KNEE (Right)  REPAIR, MENISCUS, KNEE (Right)    Final Anesthesia Type: general  Patient location during evaluation: PACU  Patient participation: Yes- Able to Participate  Level of consciousness: awake and alert, oriented and awake  Post-procedure vital signs: reviewed and stable  Pain management: adequate  Airway patency: patent  PONV status at discharge: No PONV  Anesthetic complications: no      Cardiovascular status: blood pressure returned to baseline and hemodynamically stable  Respiratory status: unassisted, spontaneous ventilation and room air  Hydration status: euvolemic  Follow-up not needed.          Vitals Value Taken Time   /79 7/16/2019 10:20 AM   Temp 36.7 °C (98 °F) 7/16/2019  9:52 AM   Pulse 73 7/16/2019 10:20 AM   Resp 18 7/16/2019 10:20 AM   SpO2 98 % 7/16/2019 10:20 AM         Event Time     Out of Recovery 09:52:00          Pain/Beti Score: Pain Rating Prior to Med Admin: 9 (7/16/2019  9:45 AM)  Pain Rating Post Med Admin: 5 (7/16/2019  9:45 AM)  Beti Score: 10 (7/16/2019 10:21 AM)

## 2019-07-16 NOTE — PLAN OF CARE
Meets criteria for discharge. Discharged to home with wife and parents. Denies nausea. Tolerating fluids. Denies pain. Understands and demonstrates toe touch weight bearing, transfers and crutch use. Discharge instructions reviewed and printed handout given. Crutches issued and ice pack given. Verbalized understanding.

## 2019-07-16 NOTE — TRANSFER OF CARE
"Anesthesia Transfer of Care Note    Patient: Kevin Ca    Procedure(s) Performed: Procedure(s) (LRB):  ARTHROSCOPY, KNEE (Right)  REPAIR, MENISCUS, KNEE (Right)    Patient location: PACU    Anesthesia Type: general    Transport from OR: Transported from OR on 2-3 L/min O2 by NC with adequate spontaneous ventilation    Post pain: adequate analgesia    Post assessment: no apparent anesthetic complications and tolerated procedure well    Post vital signs: stable    Level of consciousness: awake, alert and oriented    Nausea/Vomiting: no nausea/vomiting    Complications: none    Transfer of care protocol was followed      Last vitals:   Visit Vitals  /73 (BP Location: Left arm, Patient Position: Lying)   Pulse 68   Temp 36.6 °C (97.9 °F) (Skin)   Resp 16   Ht 5' 9" (1.753 m)   Wt 95.3 kg (210 lb)   SpO2 99%   BMI 31.01 kg/m²     "

## 2019-07-16 NOTE — PLAN OF CARE
Pt aaox4, states pain is 5 out 10 and states tolerable, denies nausea and tolerating clear liquids, vss, dressing cdi, family updated, pt released by anesthesia to transfer to phase II, report given to JAYSON Salcido

## 2019-07-16 NOTE — PLAN OF CARE
Pt. Is AAOx4, calm and cooperative.  Denies nausea, no pain at rest, pain 4/10 right knee with standing, walking. Breathing unlabored on room air.  RN reviewed plan of care, perioperative care with pt. And spouse who both voiced understanding.  Questions answered, comfort assured.  Prepared for surgery.

## 2019-07-18 ENCOUNTER — TELEPHONE (OUTPATIENT)
Dept: ORTHOPEDICS | Facility: CLINIC | Age: 49
End: 2019-07-18

## 2019-07-18 NOTE — TELEPHONE ENCOUNTER
Returned call and advised to put waterproof bandages over incision. Pt wife verbalized understanding.

## 2019-07-18 NOTE — TELEPHONE ENCOUNTER
----- Message from Layla Garcia sent at 7/18/2019  2:51 PM CDT -----  Contact: Wife - Brooke  Needs Advice    Reason for call: Wife ask for a call in regards to the patient's steri strips coming off when bandages was removed         Communication Preference: Phone     Additional Information: n/a

## 2019-07-18 NOTE — TELEPHONE ENCOUNTER
----- Message from Macy Sapp sent at 7/18/2019  2:54 PM CDT -----  Contact: Yovana wife  Type: Needs Medical Advice    Who Called:  Yovana  Symptoms (please be specific):  The strips came off when she changed his bandages  Best Call Back Number: [phone]  Additional Information: Pls call Yovana to adv of what should be done

## 2019-07-29 ENCOUNTER — OFFICE VISIT (OUTPATIENT)
Dept: ORTHOPEDICS | Facility: CLINIC | Age: 49
End: 2019-07-29
Payer: COMMERCIAL

## 2019-07-29 VITALS
HEIGHT: 69 IN | DIASTOLIC BLOOD PRESSURE: 72 MMHG | HEART RATE: 76 BPM | SYSTOLIC BLOOD PRESSURE: 147 MMHG | BODY MASS INDEX: 31.1 KG/M2 | WEIGHT: 210 LBS

## 2019-07-29 DIAGNOSIS — S83.281A ACUTE LATERAL MENISCAL TEAR, RIGHT, INITIAL ENCOUNTER: Primary | ICD-10-CM

## 2019-07-29 PROCEDURE — 99024 PR POST-OP FOLLOW-UP VISIT: ICD-10-PCS | Mod: S$GLB,,, | Performed by: ORTHOPAEDIC SURGERY

## 2019-07-29 PROCEDURE — 99999 PR PBB SHADOW E&M-EST. PATIENT-LVL III: CPT | Mod: PBBFAC,,, | Performed by: ORTHOPAEDIC SURGERY

## 2019-07-29 PROCEDURE — 99999 PR PBB SHADOW E&M-EST. PATIENT-LVL III: ICD-10-PCS | Mod: PBBFAC,,, | Performed by: ORTHOPAEDIC SURGERY

## 2019-07-29 PROCEDURE — 99024 POSTOP FOLLOW-UP VISIT: CPT | Mod: S$GLB,,, | Performed by: ORTHOPAEDIC SURGERY

## 2019-07-29 NOTE — PROGRESS NOTES
History reviewed. No pertinent past medical history.    Past Surgical History:   Procedure Laterality Date    ARTHROSCOPY, KNEE Right 7/16/2019    Performed by Ganesh Berry MD at [facility] OR    HAND TENDON SURGERY Left 1992    HERNIA REPAIR Right 04/12/2017    with mesh    REPAIR, MENISCUS, KNEE Right 7/16/2019    Performed by Ganesh Berry MD at [facility] OR       No current outpatient medications on file.     No current facility-administered medications for this visit.        Review of patient's allergies indicates:  No Known Allergies    Family History   Problem Relation Age of Onset    Cancer Maternal Grandfather        Social History     Socioeconomic History    Marital status:      Spouse name: Not on file    Number of children: Not on file    Years of education: Not on file    Highest education level: Not on file   Occupational History    Not on file   Social Needs    Financial resource strain: Not on file    Food insecurity:     Worry: Not on file     Inability: Not on file    Transportation needs:     Medical: Not on file     Non-medical: Not on file   Tobacco Use    Smoking status: Never Smoker    Smokeless tobacco: Never Used   Substance and Sexual Activity    Alcohol use: No    Drug use: No    Sexual activity: Not on file   Lifestyle    Physical activity:     Days per week: Not on file     Minutes per session: Not on file    Stress: Not on file   Relationships    Social connections:     Talks on phone: Not on file     Gets together: Not on file     Attends Voodoo service: Not on file     Active member of club or organization: Not on file     Attends meetings of clubs or organizations: Not on file     Relationship status: Not on file   Other Topics Concern    Not on file   Social History Narrative    Not on file       Chief Complaint:   Chief Complaint   Patient presents with    Knee Pain     R knee s/p scope 7/16/19        Date of surgery:  July 16,  2019    History of present illness:  48-year-old male who underwent right arthroscopic lateral meniscal repair with PRP augmentation.  Pain is a 2/10.  Compliant with the brace and crutches.      Review of Systems:    Musculoskeletal:  See HPI        Physical Examination:    Vital Signs:    Vitals:    07/29/19 1030   BP: (!) 147/72   Pulse: 76       Body mass index is 31.01 kg/m².    This a well-developed, well nourished patient in no acute distress.  They are alert and oriented and cooperative to examination.  Pt. walks using crutches    Examination of the right knee shows well-healing surgical portals.  No erythema or drainage. No joint effusion.  Range of motion is 0-90.  No calf pain. Negative Homans sign.    X-rays:  None     Assessment::  Status post right arthroscopic lateral meniscal repair with PRP augmentation    Plan:  I reviewed the arthroscopic photos with him today.  We took out the stitches.  We will start some physical therapy for a meniscal repair protocol.  Continued use the brace and crutches or cane.  Follow up in 4 weeks.    This note was created using [email or web address] voice recognition software that occasionally misinterpreted phrases or words.

## 2019-08-26 ENCOUNTER — OFFICE VISIT (OUTPATIENT)
Dept: ORTHOPEDICS | Facility: CLINIC | Age: 49
End: 2019-08-26
Payer: COMMERCIAL

## 2019-08-26 VITALS
DIASTOLIC BLOOD PRESSURE: 72 MMHG | BODY MASS INDEX: 31.1 KG/M2 | WEIGHT: 210 LBS | SYSTOLIC BLOOD PRESSURE: 154 MMHG | HEART RATE: 70 BPM | HEIGHT: 69 IN

## 2019-08-26 DIAGNOSIS — S83.281D ACUTE LATERAL MENISCAL TEAR, RIGHT, SUBSEQUENT ENCOUNTER: Primary | ICD-10-CM

## 2019-08-26 PROCEDURE — 99999 PR PBB SHADOW E&M-EST. PATIENT-LVL III: ICD-10-PCS | Mod: PBBFAC,,, | Performed by: ORTHOPAEDIC SURGERY

## 2019-08-26 PROCEDURE — 99024 PR POST-OP FOLLOW-UP VISIT: ICD-10-PCS | Mod: S$GLB,,, | Performed by: ORTHOPAEDIC SURGERY

## 2019-08-26 PROCEDURE — 99999 PR PBB SHADOW E&M-EST. PATIENT-LVL III: CPT | Mod: PBBFAC,,, | Performed by: ORTHOPAEDIC SURGERY

## 2019-08-26 PROCEDURE — 99024 POSTOP FOLLOW-UP VISIT: CPT | Mod: S$GLB,,, | Performed by: ORTHOPAEDIC SURGERY

## 2019-08-26 NOTE — PROGRESS NOTES
History reviewed. No pertinent past medical history.    Past Surgical History:   Procedure Laterality Date    ARTHROSCOPY, KNEE Right 7/16/2019    Performed by Ganesh Berry MD at [facility] OR    HAND TENDON SURGERY Left 1992    HERNIA REPAIR Right 04/12/2017    with mesh    REPAIR, MENISCUS, KNEE Right 7/16/2019    Performed by Ganesh Berry MD at [facility] OR       No current outpatient medications on file.     No current facility-administered medications for this visit.        Review of patient's allergies indicates:  No Known Allergies    Family History   Problem Relation Age of Onset    Cancer Maternal Grandfather        Social History     Socioeconomic History    Marital status:      Spouse name: Not on file    Number of children: Not on file    Years of education: Not on file    Highest education level: Not on file   Occupational History    Not on file   Social Needs    Financial resource strain: Not on file    Food insecurity:     Worry: Not on file     Inability: Not on file    Transportation needs:     Medical: Not on file     Non-medical: Not on file   Tobacco Use    Smoking status: Never Smoker    Smokeless tobacco: Never Used   Substance and Sexual Activity    Alcohol use: No    Drug use: No    Sexual activity: Not on file   Lifestyle    Physical activity:     Days per week: Not on file     Minutes per session: Not on file    Stress: Not on file   Relationships    Social connections:     Talks on phone: Not on file     Gets together: Not on file     Attends Orthodox service: Not on file     Active member of club or organization: Not on file     Attends meetings of clubs or organizations: Not on file     Relationship status: Not on file   Other Topics Concern    Not on file   Social History Narrative    Not on file       Chief Complaint:   Chief Complaint   Patient presents with    Knee Pain     R knee 4wk f/u       Date of surgery:  July 16, 2019    History of  present illness:  48-year-old male who underwent right arthroscopic lateral meniscal repair with PRP augmentation.  Pain is a 0/10.  Compliant with the brace and using a cane      Review of Systems:    Musculoskeletal:  See HPI        Physical Examination:    Vital Signs:    There were no vitals filed for this visit.    Body mass index is 31.01 kg/m².    This a well-developed, well nourished patient in no acute distress.  They are alert and oriented and cooperative to examination.  Pt. walks using crutches    Examination of the right knee shows healed surgical portals.  No erythema or drainage. No joint effusion.  Range of motion is 0-110.  No calf pain. Negative Homans sign.    X-rays:  None     Assessment::  Status post right arthroscopic lateral meniscal repair with PRP augmentation    Plan:  Continue with the physical therapy for a meniscal repair protocol.  We will transition from the range-of-motion brace to just a simple hinged.  Okay to stop a cane or crutch.  Follow up in 6 weeks.    This note was created using [employer] voice recognition software that occasionally misinterpreted phrases or words.

## 2019-10-07 ENCOUNTER — OFFICE VISIT (OUTPATIENT)
Dept: ORTHOPEDICS | Facility: CLINIC | Age: 49
End: 2019-10-07
Payer: COMMERCIAL

## 2019-10-07 VITALS — BODY MASS INDEX: 31.1 KG/M2 | RESPIRATION RATE: 19 BRPM | HEIGHT: 69 IN | WEIGHT: 210 LBS

## 2019-10-07 DIAGNOSIS — S83.281D ACUTE LATERAL MENISCAL TEAR, RIGHT, SUBSEQUENT ENCOUNTER: Primary | ICD-10-CM

## 2019-10-07 PROCEDURE — 99024 PR POST-OP FOLLOW-UP VISIT: ICD-10-PCS | Mod: S$GLB,,, | Performed by: ORTHOPAEDIC SURGERY

## 2019-10-07 PROCEDURE — 99999 PR PBB SHADOW E&M-EST. PATIENT-LVL III: CPT | Mod: PBBFAC,,, | Performed by: ORTHOPAEDIC SURGERY

## 2019-10-07 PROCEDURE — 99024 POSTOP FOLLOW-UP VISIT: CPT | Mod: S$GLB,,, | Performed by: ORTHOPAEDIC SURGERY

## 2019-10-07 PROCEDURE — 99999 PR PBB SHADOW E&M-EST. PATIENT-LVL III: ICD-10-PCS | Mod: PBBFAC,,, | Performed by: ORTHOPAEDIC SURGERY

## 2019-10-07 NOTE — PROGRESS NOTES
History reviewed. No pertinent past medical history.    Past Surgical History:   Procedure Laterality Date    ARTHROSCOPY OF KNEE Right 7/16/2019    Procedure: ARTHROSCOPY, KNEE;  Surgeon: Ganesh Berry MD;  Location: [facility] OR;  Service: Orthopedics;  Laterality: Right;    HAND TENDON SURGERY Left 1992    HERNIA REPAIR Right 04/12/2017    with mesh    REPAIR OF MENISCUS OF KNEE Right 7/16/2019    Procedure: REPAIR, MENISCUS, KNEE;  Surgeon: Ganesh Berry MD;  Location: [facility] OR;  Service: Orthopedics;  Laterality: Right;       No current outpatient medications on file.     No current facility-administered medications for this visit.        Review of patient's allergies indicates:  No Known Allergies    Family History   Problem Relation Age of Onset    Cancer Maternal Grandfather        Social History     Socioeconomic History    Marital status:      Spouse name: Not on file    Number of children: Not on file    Years of education: Not on file    Highest education level: Not on file   Occupational History    Not on file   Social Needs    Financial resource strain: Not on file    Food insecurity:     Worry: Not on file     Inability: Not on file    Transportation needs:     Medical: Not on file     Non-medical: Not on file   Tobacco Use    Smoking status: Never Smoker    Smokeless tobacco: Never Used   Substance and Sexual Activity    Alcohol use: No    Drug use: No    Sexual activity: Not on file   Lifestyle    Physical activity:     Days per week: Not on file     Minutes per session: Not on file    Stress: Not on file   Relationships    Social connections:     Talks on phone: Not on file     Gets together: Not on file     Attends Jew service: Not on file     Active member of club or organization: Not on file     Attends meetings of clubs or organizations: Not on file     Relationship status: Not on file   Other Topics Concern    Not on file   Social History  Narrative    Not on file       Chief Complaint:   Chief Complaint   Patient presents with    Knee Pain     right leg 6week follow up       Date of surgery:  July 16, 2019    History of present illness:  48-year-old male who underwent right arthroscopic lateral meniscal repair with PRP augmentation.  Pain is a 0/10.  Compliant with the brace and physical therapy.      Review of Systems:    Musculoskeletal:  See HPI        Physical Examination:    Vital Signs:    Vitals:    10/07/19 0944   Resp: 19       Body mass index is 31.01 kg/m².    This a well-developed, well nourished patient in no acute distress.  They are alert and oriented and cooperative to examination.  Pt. walks using crutches    Examination of the right knee shows healed surgical portals.  No erythema or drainage. No joint effusion.  Range of motion is 0-130.  No calf pain. Negative Homans sign.    X-rays:  None     Assessment::  Status post right arthroscopic lateral meniscal repair with PRP augmentation    Plan:  Continue with the physical therapy for a meniscal repair protocol.  Continue to progress with protocol in the activity.  I will see him back in 2 months.  Hopefully we can release him back to work at that point.    This note was created using [employer] voice recognition software that occasionally misinterpreted phrases or words.

## 2019-12-02 ENCOUNTER — OFFICE VISIT (OUTPATIENT)
Dept: ORTHOPEDICS | Facility: CLINIC | Age: 49
End: 2019-12-02
Payer: COMMERCIAL

## 2019-12-02 VITALS
SYSTOLIC BLOOD PRESSURE: 164 MMHG | HEART RATE: 68 BPM | DIASTOLIC BLOOD PRESSURE: 82 MMHG | BODY MASS INDEX: 31.1 KG/M2 | WEIGHT: 210 LBS | HEIGHT: 69 IN

## 2019-12-02 DIAGNOSIS — S83.281D ACUTE LATERAL MENISCAL TEAR, RIGHT, SUBSEQUENT ENCOUNTER: Primary | ICD-10-CM

## 2019-12-02 PROCEDURE — 3008F BODY MASS INDEX DOCD: CPT | Mod: CPTII,S$GLB,, | Performed by: ORTHOPAEDIC SURGERY

## 2019-12-02 PROCEDURE — 99213 PR OFFICE/OUTPT VISIT, EST, LEVL III, 20-29 MIN: ICD-10-PCS | Mod: S$GLB,,, | Performed by: ORTHOPAEDIC SURGERY

## 2019-12-02 PROCEDURE — 99999 PR PBB SHADOW E&M-EST. PATIENT-LVL III: ICD-10-PCS | Mod: PBBFAC,,, | Performed by: ORTHOPAEDIC SURGERY

## 2019-12-02 PROCEDURE — 99999 PR PBB SHADOW E&M-EST. PATIENT-LVL III: CPT | Mod: PBBFAC,,, | Performed by: ORTHOPAEDIC SURGERY

## 2019-12-02 PROCEDURE — 3008F PR BODY MASS INDEX (BMI) DOCUMENTED: ICD-10-PCS | Mod: CPTII,S$GLB,, | Performed by: ORTHOPAEDIC SURGERY

## 2019-12-02 PROCEDURE — 99213 OFFICE O/P EST LOW 20 MIN: CPT | Mod: S$GLB,,, | Performed by: ORTHOPAEDIC SURGERY

## 2019-12-02 NOTE — PROGRESS NOTES
History reviewed. No pertinent past medical history.    Past Surgical History:   Procedure Laterality Date    ARTHROSCOPY OF KNEE Right 7/16/2019    Procedure: ARTHROSCOPY, KNEE;  Surgeon: Ganesh Berry MD;  Location: [facility] OR;  Service: Orthopedics;  Laterality: Right;    HAND TENDON SURGERY Left 1992    HERNIA REPAIR Right 04/12/2017    with mesh    REPAIR OF MENISCUS OF KNEE Right 7/16/2019    Procedure: REPAIR, MENISCUS, KNEE;  Surgeon: Ganesh Berry MD;  Location: [facility] OR;  Service: Orthopedics;  Laterality: Right;       No current outpatient medications on file.     No current facility-administered medications for this visit.        Review of patient's allergies indicates:  No Known Allergies    Family History   Problem Relation Age of Onset    Cancer Maternal Grandfather        Social History     Socioeconomic History    Marital status:      Spouse name: Not on file    Number of children: Not on file    Years of education: Not on file    Highest education level: Not on file   Occupational History    Not on file   Social Needs    Financial resource strain: Not on file    Food insecurity:     Worry: Not on file     Inability: Not on file    Transportation needs:     Medical: Not on file     Non-medical: Not on file   Tobacco Use    Smoking status: Never Smoker    Smokeless tobacco: Never Used   Substance and Sexual Activity    Alcohol use: No    Drug use: No    Sexual activity: Not on file   Lifestyle    Physical activity:     Days per week: Not on file     Minutes per session: Not on file    Stress: Not on file   Relationships    Social connections:     Talks on phone: Not on file     Gets together: Not on file     Attends Sabianism service: Not on file     Active member of club or organization: Not on file     Attends meetings of clubs or organizations: Not on file     Relationship status: Not on file   Other Topics Concern    Not on file   Social History  Narrative    Not on file       Chief Complaint:   Chief Complaint   Patient presents with    Post-op Evaluation     s/p right knee scope 7/16/19        Date of surgery:  July 16, 2019    History of present illness:  48-year-old male who underwent right arthroscopic lateral meniscal repair with PRP augmentation.  Pain is a 0/10.  Compliant with the physical therapy.  They have initiated some work hardening as well.      Review of Systems:    Musculoskeletal:  See HPI        Physical Examination:    Vital Signs:    Vitals:    12/02/19 0846   BP: (!) 164/82   Pulse: 68       Body mass index is 31.01 kg/m².    This a well-developed, well nourished patient in no acute distress.  They are alert and oriented and cooperative to examination.  Pt. walks using crutches    Examination of the right knee shows healed surgical portals.  No erythema or drainage. No joint effusion.  Range of motion is 0-130.  No calf pain. Negative Homans sign.    X-rays:  None     Assessment::  Status post right arthroscopic lateral meniscal repair with PRP augmentation    Plan:  Continue with the physical therapy for a meniscal repair protocol.  He is also doing a lot of work hardening as well. Continue to progress with protocol in the activity.  I will see him back in 6 weeks.  Hopefully we can release him back to work at that point.    This note was created using [employer] voice recognition software that occasionally misinterpreted phrases or words.

## 2020-01-13 ENCOUNTER — OFFICE VISIT (OUTPATIENT)
Dept: ORTHOPEDICS | Facility: CLINIC | Age: 50
End: 2020-01-13
Payer: COMMERCIAL

## 2020-01-13 VITALS
HEIGHT: 69 IN | HEART RATE: 74 BPM | BODY MASS INDEX: 31.1 KG/M2 | DIASTOLIC BLOOD PRESSURE: 70 MMHG | SYSTOLIC BLOOD PRESSURE: 154 MMHG | WEIGHT: 210 LBS

## 2020-01-13 DIAGNOSIS — S83.281D ACUTE LATERAL MENISCAL TEAR, RIGHT, SUBSEQUENT ENCOUNTER: Primary | ICD-10-CM

## 2020-01-13 PROCEDURE — 99999 PR PBB SHADOW E&M-EST. PATIENT-LVL III: ICD-10-PCS | Mod: PBBFAC,,, | Performed by: ORTHOPAEDIC SURGERY

## 2020-01-13 PROCEDURE — 99999 PR PBB SHADOW E&M-EST. PATIENT-LVL III: CPT | Mod: PBBFAC,,, | Performed by: ORTHOPAEDIC SURGERY

## 2020-01-13 PROCEDURE — 99213 PR OFFICE/OUTPT VISIT, EST, LEVL III, 20-29 MIN: ICD-10-PCS | Mod: S$GLB,,, | Performed by: ORTHOPAEDIC SURGERY

## 2020-01-13 PROCEDURE — 99213 OFFICE O/P EST LOW 20 MIN: CPT | Mod: S$GLB,,, | Performed by: ORTHOPAEDIC SURGERY

## 2020-01-13 PROCEDURE — 3008F PR BODY MASS INDEX (BMI) DOCUMENTED: ICD-10-PCS | Mod: CPTII,S$GLB,, | Performed by: ORTHOPAEDIC SURGERY

## 2020-01-13 PROCEDURE — 3008F BODY MASS INDEX DOCD: CPT | Mod: CPTII,S$GLB,, | Performed by: ORTHOPAEDIC SURGERY

## 2020-01-13 RX ORDER — MULTIVITAMIN
1 TABLET ORAL DAILY
COMMUNITY

## 2020-01-13 NOTE — PROGRESS NOTES
History reviewed. No pertinent past medical history.    Past Surgical History:   Procedure Laterality Date    ARTHROSCOPY OF KNEE Right 7/16/2019    Procedure: ARTHROSCOPY, KNEE;  Surgeon: Ganesh Berry MD;  Location: [facility] OR;  Service: Orthopedics;  Laterality: Right;    HAND TENDON SURGERY Left 1992    HERNIA REPAIR Right 04/12/2017    with mesh    REPAIR OF MENISCUS OF KNEE Right 7/16/2019    Procedure: REPAIR, MENISCUS, KNEE;  Surgeon: Ganesh Berry MD;  Location: [facility] OR;  Service: Orthopedics;  Laterality: Right;       Current Outpatient Medications   Medication Sig    multivitamin (ONE DAILY MULTIVITAMIN) per tablet Take 1 tablet by mouth once daily.     No current facility-administered medications for this visit.        Review of patient's allergies indicates:  No Known Allergies    Family History   Problem Relation Age of Onset    Cancer Maternal Grandfather        Social History     Socioeconomic History    Marital status:      Spouse name: Not on file    Number of children: Not on file    Years of education: Not on file    Highest education level: Not on file   Occupational History    Not on file   Social Needs    Financial resource strain: Not on file    Food insecurity:     Worry: Not on file     Inability: Not on file    Transportation needs:     Medical: Not on file     Non-medical: Not on file   Tobacco Use    Smoking status: Never Smoker    Smokeless tobacco: Never Used   Substance and Sexual Activity    Alcohol use: No    Drug use: No    Sexual activity: Not on file   Lifestyle    Physical activity:     Days per week: Not on file     Minutes per session: Not on file    Stress: Not on file   Relationships    Social connections:     Talks on phone: Not on file     Gets together: Not on file     Attends Cheondoism service: Not on file     Active member of club or organization: Not on file     Attends meetings of clubs or organizations: Not on file      Relationship status: Not on file   Other Topics Concern    Not on file   Social History Narrative    Not on file       Chief Complaint:   Chief Complaint   Patient presents with    Right Knee - Post-op Evaluation       Date of surgery:  July 16, 2019    History of present illness:  49-year-old male who underwent right arthroscopic lateral meniscal repair with PRP augmentation.  Pain is a 0/10.  Compliant with the physical therapy.  They have initiated some work hardening as well. Still having some issues with squatting and stooping.  Does not feel like he can safely perform his duties as a  without restrictions yet.      Review of Systems:    Musculoskeletal:  See HPI        Physical Examination:    Vital Signs:    Vitals:    01/13/20 0824   BP: (!) 154/70   Pulse: 74       Body mass index is 31.01 kg/m².    This a well-developed, well nourished patient in no acute distress.  They are alert and oriented and cooperative to examination.  Pt. walks using crutches    Examination of the right knee shows healed surgical portals.  No erythema or drainage. No joint effusion.  Range of motion is 0-130.  No calf pain. Negative Homans sign.    X-rays:  None     Assessment::  Status post right arthroscopic lateral meniscal repair with PRP augmentation    Plan:  Continue with the physical therapy for a meniscal repair protocol.  He is also doing a lot of work hardening as well. Continue to progress with protocol in the activity.  I will see him back in 6 weeks.  Hopefully we can release him back to work at that point.    This note was created using [email or web address] voice recognition software that occasionally misinterpreted phrases or words.

## 2020-02-24 ENCOUNTER — OFFICE VISIT (OUTPATIENT)
Dept: ORTHOPEDICS | Facility: CLINIC | Age: 50
End: 2020-02-24
Payer: COMMERCIAL

## 2020-02-24 VITALS
WEIGHT: 210 LBS | HEIGHT: 69 IN | DIASTOLIC BLOOD PRESSURE: 83 MMHG | BODY MASS INDEX: 31.1 KG/M2 | HEART RATE: 81 BPM | SYSTOLIC BLOOD PRESSURE: 152 MMHG

## 2020-02-24 DIAGNOSIS — S83.281D ACUTE LATERAL MENISCAL TEAR, RIGHT, SUBSEQUENT ENCOUNTER: Primary | ICD-10-CM

## 2020-02-24 PROCEDURE — 3008F BODY MASS INDEX DOCD: CPT | Mod: CPTII,S$GLB,, | Performed by: ORTHOPAEDIC SURGERY

## 2020-02-24 PROCEDURE — 99999 PR PBB SHADOW E&M-EST. PATIENT-LVL III: CPT | Mod: PBBFAC,,, | Performed by: ORTHOPAEDIC SURGERY

## 2020-02-24 PROCEDURE — 99999 PR PBB SHADOW E&M-EST. PATIENT-LVL III: ICD-10-PCS | Mod: PBBFAC,,, | Performed by: ORTHOPAEDIC SURGERY

## 2020-02-24 PROCEDURE — 99213 PR OFFICE/OUTPT VISIT, EST, LEVL III, 20-29 MIN: ICD-10-PCS | Mod: S$GLB,,, | Performed by: ORTHOPAEDIC SURGERY

## 2020-02-24 PROCEDURE — 99213 OFFICE O/P EST LOW 20 MIN: CPT | Mod: S$GLB,,, | Performed by: ORTHOPAEDIC SURGERY

## 2020-02-24 PROCEDURE — 3008F PR BODY MASS INDEX (BMI) DOCUMENTED: ICD-10-PCS | Mod: CPTII,S$GLB,, | Performed by: ORTHOPAEDIC SURGERY

## 2020-02-24 NOTE — PROGRESS NOTES
History reviewed. No pertinent past medical history.    Past Surgical History:   Procedure Laterality Date    ARTHROSCOPY OF KNEE Right 7/16/2019    Procedure: ARTHROSCOPY, KNEE;  Surgeon: Ganesh Berry MD;  Location: [facility] OR;  Service: Orthopedics;  Laterality: Right;    HAND TENDON SURGERY Left 1992    HERNIA REPAIR Right 04/12/2017    with mesh    REPAIR OF MENISCUS OF KNEE Right 7/16/2019    Procedure: REPAIR, MENISCUS, KNEE;  Surgeon: Ganesh Berry MD;  Location: [facility] OR;  Service: Orthopedics;  Laterality: Right;       Current Outpatient Medications   Medication Sig    multivitamin (ONE DAILY MULTIVITAMIN) per tablet Take 1 tablet by mouth once daily.     No current facility-administered medications for this visit.        Review of patient's allergies indicates:  No Known Allergies    Family History   Problem Relation Age of Onset    Cancer Maternal Grandfather        Social History     Socioeconomic History    Marital status:      Spouse name: Not on file    Number of children: Not on file    Years of education: Not on file    Highest education level: Not on file   Occupational History    Not on file   Social Needs    Financial resource strain: Not on file    Food insecurity:     Worry: Not on file     Inability: Not on file    Transportation needs:     Medical: Not on file     Non-medical: Not on file   Tobacco Use    Smoking status: Never Smoker    Smokeless tobacco: Never Used   Substance and Sexual Activity    Alcohol use: No    Drug use: No    Sexual activity: Not on file   Lifestyle    Physical activity:     Days per week: Not on file     Minutes per session: Not on file    Stress: Not on file   Relationships    Social connections:     Talks on phone: Not on file     Gets together: Not on file     Attends Latter-day service: Not on file     Active member of club or organization: Not on file     Attends meetings of clubs or organizations: Not on file      Relationship status: Not on file   Other Topics Concern    Not on file   Social History Narrative    Not on file       Chief Complaint:   Chief Complaint   Patient presents with    Right Knee - Post-op Evaluation       Date of surgery:  July 16, 2019    History of present illness:  49-year-old male who underwent right arthroscopic lateral meniscal repair with PRP augmentation.  He just finished physical therapy.  He rates his pain is a 2/10.  A little achy at times as he does little more.      Review of Systems:    Musculoskeletal:  See HPI        Physical Examination:    Vital Signs:    There were no vitals filed for this visit.    Body mass index is 31.01 kg/m².    This a well-developed, well nourished patient in no acute distress.  They are alert and oriented and cooperative to examination.  Pt. walks using crutches    Examination of the right knee shows healed surgical portals.  No erythema or drainage. No joint effusion.  Range of motion is 0-130.  No calf pain. Negative Homans sign.    X-rays:  None     Assessment::  Status post right arthroscopic lateral meniscal repair with PRP augmentation    Plan:  We will release him back to work.  I will check him back in 4 months at the 1 year jaspreet.  Follow up sooner if needed.    This note was created using [employer] voice recognition software that occasionally misinterpreted phrases or words.

## 2020-04-27 ENCOUNTER — TELEPHONE (OUTPATIENT)
Dept: ORTHOPEDICS | Facility: CLINIC | Age: 50
End: 2020-04-27

## 2020-04-27 NOTE — TELEPHONE ENCOUNTER
----- Message from Libra Myers sent at 4/27/2020 10:19 AM CDT -----  Contact: SELF [phone]   Patient want to know can his spouse ancelmo  his paper work due to him working during the week.  Patient contact # [phone]

## 2020-08-11 ENCOUNTER — OFFICE VISIT (OUTPATIENT)
Dept: FAMILY MEDICINE | Facility: CLINIC | Age: 50
End: 2020-08-11
Payer: COMMERCIAL

## 2020-08-11 VITALS
TEMPERATURE: 99 F | DIASTOLIC BLOOD PRESSURE: 74 MMHG | HEART RATE: 84 BPM | SYSTOLIC BLOOD PRESSURE: 122 MMHG | BODY MASS INDEX: 32.49 KG/M2 | WEIGHT: 220 LBS

## 2020-08-11 DIAGNOSIS — J02.9 SORE THROAT: ICD-10-CM

## 2020-08-11 DIAGNOSIS — M54.2 NECK PAIN ON LEFT SIDE: Primary | ICD-10-CM

## 2020-08-11 DIAGNOSIS — J35.9 CHRONIC DISEASE OF TONSILS AND ADENOIDS, UNSPECIFIED: ICD-10-CM

## 2020-08-11 PROBLEM — Z78.9 NON-SMOKER: Status: ACTIVE | Noted: 2020-08-11

## 2020-08-11 PROCEDURE — 3008F PR BODY MASS INDEX (BMI) DOCUMENTED: ICD-10-PCS | Mod: S$GLB,,, | Performed by: NURSE PRACTITIONER

## 2020-08-11 PROCEDURE — 99214 OFFICE O/P EST MOD 30 MIN: CPT | Mod: S$GLB,,, | Performed by: NURSE PRACTITIONER

## 2020-08-11 PROCEDURE — 3008F BODY MASS INDEX DOCD: CPT | Mod: S$GLB,,, | Performed by: NURSE PRACTITIONER

## 2020-08-11 PROCEDURE — 99214 PR OFFICE/OUTPT VISIT, EST, LEVL IV, 30-39 MIN: ICD-10-PCS | Mod: S$GLB,,, | Performed by: NURSE PRACTITIONER

## 2020-08-11 NOTE — PROGRESS NOTES
SUBJECTIVE:    Patient ID: Kevin Ca is a 49 y.o. male.    Chief Complaint: Follow-up (trouble swallowing comes and goes,feels like a blockage x2 weeks// SW)     49-year-old male presents with complaints of trouble swallowing.  Reports that symptoms started about 1 month ago.  Reports that occurs while talking.  Patient has to stop and drink water for it to resolve.  Does not describe it as pain.  Feels as though his throat is closing up on him.  Denies any history of thyroid trouble.  Denies any congestion.  Denies that these episodes are occurring with any food intake.      No visits with results within 6 Month(s) from this visit.   Latest known visit with results is:   Hospital Outpatient Visit on 07/12/2019   Component Date Value Ref Range Status    Sodium 07/12/2019 143  136 - 145 mmol/L Final    Potassium 07/12/2019 5.0  3.5 - 5.1 mmol/L Final    Chloride 07/12/2019 104  95 - 110 mmol/L Final    CO2 07/12/2019 29  23 - 29 mmol/L Final    Glucose 07/12/2019 93  70 - 110 mg/dL Final    BUN, Bld 07/12/2019 13  6 - 20 mg/dL Final    Creatinine 07/12/2019 1.0  0.5 - 1.4 mg/dL Final    Calcium 07/12/2019 10.4  8.7 - 10.5 mg/dL Final    Anion Gap 07/12/2019 10  8 - 16 mmol/L Final    eGFR if African American 07/12/2019 >60  >60 mL/min/1.73 m^2 Final    eGFR if non African American 07/12/2019 >60  >60 mL/min/1.73 m^2 Final    WBC 07/12/2019 6.11  3.90 - 12.70 K/uL Final    RBC 07/12/2019 4.74  4.60 - 6.20 M/uL Final    Hemoglobin 07/12/2019 13.8* 14.0 - 18.0 g/dL Final    Hematocrit 07/12/2019 42.5  40.0 - 54.0 % Final    Mean Corpuscular Volume 07/12/2019 90  82 - 98 fL Final    Mean Corpuscular Hemoglobin 07/12/2019 29.1  27.0 - 31.0 pg Final    Mean Corpuscular Hemoglobin Conc 07/12/2019 32.5  32.0 - 36.0 g/dL Final    RDW 07/12/2019 12.5  11.5 - 14.5 % Final    Platelets 07/12/2019 218  150 - 350 K/uL Final    MPV 07/12/2019 10.2  9.2 - 12.9 fL Final    Gran # (ANC) 07/12/2019 3.1  1.8  - 7.7 K/uL Final    Immature Grans (Abs) 07/12/2019 0.01  0.00 - 0.04 K/uL Final    Lymph # 07/12/2019 2.3  1.0 - 4.8 K/uL Final    Mono # 07/12/2019 0.6  0.3 - 1.0 K/uL Final    Eos # 07/12/2019 0.1  0.0 - 0.5 K/uL Final    Baso # 07/12/2019 0.02  0.00 - 0.20 K/uL Final    nRBC 07/12/2019 0  0 /100 WBC Final    Gran% 07/12/2019 51.2  38.0 - 73.0 % Final    Lymph% 07/12/2019 38.0  18.0 - 48.0 % Final    Mono% 07/12/2019 9.0  4.0 - 15.0 % Final    Eosinophil% 07/12/2019 1.3  0.0 - 8.0 % Final    Basophil% 07/12/2019 0.3  0.0 - 1.9 % Final    Differential Method 07/12/2019 Automated   Final       History reviewed. No pertinent past medical history.  Past Surgical History:   Procedure Laterality Date    ARTHROSCOPY OF KNEE Right 7/16/2019    Procedure: ARTHROSCOPY, KNEE;  Surgeon: Ganesh Berry MD;  Location: [facility] OR;  Service: Orthopedics;  Laterality: Right;    HAND TENDON SURGERY Left 1992    HERNIA REPAIR Right 04/12/2017    with mesh    REPAIR OF MENISCUS OF KNEE Right 7/16/2019    Procedure: REPAIR, MENISCUS, KNEE;  Surgeon: Ganesh Berry MD;  Location: [facility] OR;  Service: Orthopedics;  Laterality: Right;     Family History   Problem Relation Age of Onset    Cancer Maternal Grandfather        Marital Status:   Alcohol History:  reports no history of alcohol use.  Tobacco History:  reports that he has never smoked. He has never used smokeless tobacco.  Drug History:  reports no history of drug use.    Review of patient's allergies indicates:  No Known Allergies    Current Outpatient Medications:     multivitamin (ONE DAILY MULTIVITAMIN) per tablet, Take 1 tablet by mouth once daily., Disp: , Rfl:     Review of Systems   Constitutional: Negative for fatigue, fever and unexpected weight change.   HENT: Positive for sore throat and trouble swallowing. Negative for ear pain, sinus pressure, tinnitus and voice change.    Eyes: Negative for pain.   Respiratory: Negative for  cough and shortness of breath.    Cardiovascular: Negative for chest pain and leg swelling.   Gastrointestinal: Negative for abdominal pain, constipation, nausea and vomiting.   Genitourinary: Negative for dysuria, frequency and urgency.   Musculoskeletal: Negative for arthralgias.   Skin: Negative for rash.   Neurological: Negative for dizziness, weakness and headaches.   Psychiatric/Behavioral: Negative for sleep disturbance.          Objective:      Vitals:    08/11/20 0908   BP: 122/74   Pulse: 84   Temp: 99 °F (37.2 °C)   Weight: 99.8 kg (220 lb)     Body mass index is 32.49 kg/m².  Physical Exam  Constitutional:       Appearance: He is well-developed.   Neck:      Musculoskeletal: Neck supple.      Trachea: Trachea normal. No tracheal deviation.   Cardiovascular:      Rate and Rhythm: Normal rate and regular rhythm.      Heart sounds: No murmur. No friction rub. No gallop.    Pulmonary:      Breath sounds: Normal breath sounds. No stridor. No wheezing or rales.   Abdominal:      Palpations: Abdomen is soft. There is no mass.      Tenderness: There is no abdominal tenderness.   Musculoskeletal:         General: No tenderness or deformity.   Lymphadenopathy:      Cervical: No cervical adenopathy.      Right cervical: No superficial cervical adenopathy.     Left cervical: No superficial cervical adenopathy.   Skin:     General: Skin is warm and dry.   Neurological:      Mental Status: He is alert and oriented to person, place, and time.      Coordination: Coordination normal.   Psychiatric:         Thought Content: Thought content normal.           Assessment:       1. Neck pain on left side    2. Sore throat    3. Chronic disease of tonsils and adenoids, unspecified         Plan:       Neck pain on left side  Comments:    Will see Dr. charles today  Orders:  -     Cancel: CT Soft Tissue Neck With Contrast; Future  -     Ambulatory referral/consult to ENT; Future; Expected date: 08/18/2020    Sore throat  -      Cancel: CT Soft Tissue Neck With Contrast; Future  -     Ambulatory referral/consult to ENT; Future; Expected date: 08/18/2020    Chronic disease of tonsils and adenoids, unspecified  -     Cancel: CT Soft Tissue Neck With Contrast; Future  -     Ambulatory referral/consult to ENT; Future; Expected date: 08/18/2020      Follow up in about 6 months (around 2/11/2021) for medication management.

## 2020-11-10 ENCOUNTER — CLINICAL SUPPORT (OUTPATIENT)
Dept: OTHER | Facility: CLINIC | Age: 50
End: 2020-11-10
Payer: COMMERCIAL

## 2020-11-10 DIAGNOSIS — Z00.8 ENCOUNTER FOR OTHER GENERAL EXAMINATION: ICD-10-CM

## 2020-11-11 VITALS — HEIGHT: 69 IN | BODY MASS INDEX: 32.49 KG/M2

## 2020-11-11 LAB
GLUCOSE SERPL-MCNC: 102 MG/DL (ref 60–140)
HDLC SERPL-MCNC: 82 MG/DL
POC CHOLESTEROL, LDL (DOCK): 114 MG/DL
POC CHOLESTEROL, TOTAL: 210 MG/DL
TRIGL SERPL-MCNC: 74 MG/DL

## 2021-10-29 ENCOUNTER — CLINICAL SUPPORT (OUTPATIENT)
Dept: OTHER | Facility: CLINIC | Age: 51
End: 2021-10-29
Payer: COMMERCIAL

## 2021-10-29 DIAGNOSIS — Z00.8 ENCOUNTER FOR OTHER GENERAL EXAMINATION: ICD-10-CM

## 2021-10-30 VITALS — BODY MASS INDEX: 32.49 KG/M2 | HEIGHT: 69 IN

## 2021-10-30 LAB
HDLC SERPL-MCNC: 74 MG/DL
POC CHOLESTEROL, LDL (DOCK): 115 MG/DL
POC CHOLESTEROL, TOTAL: 199 MG/DL
POC GLUCOSE, FASTING: 109 MG/DL (ref 60–110)
TRIGL SERPL-MCNC: 46 MG/DL

## 2022-07-22 ENCOUNTER — HOSPITAL ENCOUNTER (OUTPATIENT)
Dept: RADIOLOGY | Facility: HOSPITAL | Age: 52
Discharge: HOME | End: 2022-07-22
Attending: PHYSICIAN ASSISTANT
Payer: COMMERCIAL

## 2022-07-22 ENCOUNTER — OFFICE VISIT (OUTPATIENT)
Dept: FAMILY MEDICINE | Facility: CLINIC | Age: 52
End: 2022-07-22
Payer: COMMERCIAL

## 2022-07-22 ENCOUNTER — TELEPHONE (OUTPATIENT)
Dept: FAMILY MEDICINE | Facility: CLINIC | Age: 52
End: 2022-07-22

## 2022-07-22 VITALS
HEIGHT: 69 IN | HEART RATE: 74 BPM | WEIGHT: 232.63 LBS | SYSTOLIC BLOOD PRESSURE: 126 MMHG | DIASTOLIC BLOOD PRESSURE: 80 MMHG | OXYGEN SATURATION: 97 % | BODY MASS INDEX: 34.46 KG/M2

## 2022-07-22 DIAGNOSIS — M79.89 PAIN AND SWELLING OF RIGHT LOWER EXTREMITY: ICD-10-CM

## 2022-07-22 DIAGNOSIS — K21.9 GASTROESOPHAGEAL REFLUX DISEASE, UNSPECIFIED WHETHER ESOPHAGITIS PRESENT: Primary | ICD-10-CM

## 2022-07-22 DIAGNOSIS — M25.561 PAIN AND SWELLING OF RIGHT KNEE: ICD-10-CM

## 2022-07-22 DIAGNOSIS — M79.604 PAIN AND SWELLING OF RIGHT LOWER EXTREMITY: ICD-10-CM

## 2022-07-22 DIAGNOSIS — M25.461 PAIN AND SWELLING OF RIGHT KNEE: ICD-10-CM

## 2022-07-22 PROCEDURE — 93971 EXTREMITY STUDY: CPT | Mod: TC,RT

## 2022-07-22 PROCEDURE — 3008F PR BODY MASS INDEX (BMI) DOCUMENTED: ICD-10-PCS | Mod: CPTII,S$GLB,, | Performed by: PHYSICIAN ASSISTANT

## 2022-07-22 PROCEDURE — 3008F BODY MASS INDEX DOCD: CPT | Mod: CPTII,S$GLB,, | Performed by: PHYSICIAN ASSISTANT

## 2022-07-22 PROCEDURE — 3074F PR MOST RECENT SYSTOLIC BLOOD PRESSURE < 130 MM HG: ICD-10-PCS | Mod: CPTII,S$GLB,, | Performed by: PHYSICIAN ASSISTANT

## 2022-07-22 PROCEDURE — 3079F DIAST BP 80-89 MM HG: CPT | Mod: CPTII,S$GLB,, | Performed by: PHYSICIAN ASSISTANT

## 2022-07-22 PROCEDURE — 1159F MED LIST DOCD IN RCRD: CPT | Mod: CPTII,S$GLB,, | Performed by: PHYSICIAN ASSISTANT

## 2022-07-22 PROCEDURE — 3074F SYST BP LT 130 MM HG: CPT | Mod: CPTII,S$GLB,, | Performed by: PHYSICIAN ASSISTANT

## 2022-07-22 PROCEDURE — 3079F PR MOST RECENT DIASTOLIC BLOOD PRESSURE 80-89 MM HG: ICD-10-PCS | Mod: CPTII,S$GLB,, | Performed by: PHYSICIAN ASSISTANT

## 2022-07-22 PROCEDURE — 99214 PR OFFICE/OUTPT VISIT, EST, LEVL IV, 30-39 MIN: ICD-10-PCS | Mod: S$GLB,,, | Performed by: PHYSICIAN ASSISTANT

## 2022-07-22 PROCEDURE — 73564 X-RAY EXAM KNEE 4 OR MORE: CPT | Mod: TC,RT

## 2022-07-22 PROCEDURE — 1159F PR MEDICATION LIST DOCUMENTED IN MEDICAL RECORD: ICD-10-PCS | Mod: CPTII,S$GLB,, | Performed by: PHYSICIAN ASSISTANT

## 2022-07-22 PROCEDURE — 99214 OFFICE O/P EST MOD 30 MIN: CPT | Mod: S$GLB,,, | Performed by: PHYSICIAN ASSISTANT

## 2022-07-22 RX ORDER — OMEPRAZOLE 40 MG/1
40 CAPSULE, DELAYED RELEASE ORAL DAILY
COMMUNITY
End: 2022-07-22 | Stop reason: SDUPTHER

## 2022-07-22 RX ORDER — OMEPRAZOLE 40 MG/1
40 CAPSULE, DELAYED RELEASE ORAL DAILY
Qty: 90 CAPSULE | Refills: 3 | Status: SHIPPED | OUTPATIENT
Start: 2022-07-22 | End: 2023-10-06 | Stop reason: SDUPTHER

## 2022-07-22 NOTE — PROGRESS NOTES
SUBJECTIVE:    Patient ID: Kevin Ca is a 51 y.o. male.    Chief Complaint: Leg Swelling (No bottles//Pt c/o swelling in the right knee and leg x 6 months. No pain, just mild discomfort.//ISIDRA )    This is a 51-year-old male who presents today with a chief complaint of right knee and leg swelling, reportedly for the last 6 months.  Not significant pain but mild discomfort.  He did have an arthroscopy of this knee for repair of meniscus with Dr. Berry in July of 2019. X-rays are reviewed from 2019 but has not had any imaging since. He has not had any issue with the meniscus since the surgery. Swelling insidious onset. No recent long travel or surgery.  Patient denies any chest pain/shortness of breath.  He goes about his normal routine.        No visits with results within 6 Month(s) from this visit.   Latest known visit with results is:   Clinical Support on 10/29/2021   Component Date Value Ref Range Status    POC Cholesterol, Total 10/29/2021 199  <240 MG/DL Final    POC Cholesterol, HDL 10/29/2021 74  MG/DL Final    POC Cholesterol, LDL 10/29/2021 115  <160 MG/DL Final    POC Triglycerides 10/29/2021 46  <160 MG/DL Final    POC Glucose, Fasting 10/29/2021 109  60 - 110 MG/DL Final       No past medical history on file.  Past Surgical History:   Procedure Laterality Date    ARTHROSCOPY OF KNEE Right 7/16/2019    Procedure: ARTHROSCOPY, KNEE;  Surgeon: Ganesh Berry MD;  Location: [facility] OR;  Service: Orthopedics;  Laterality: Right;    HAND TENDON SURGERY Left 1992    HERNIA REPAIR Right 04/12/2017    with mesh    REPAIR OF MENISCUS OF KNEE Right 7/16/2019    Procedure: REPAIR, MENISCUS, KNEE;  Surgeon: Ganesh Berry MD;  Location: [facility] OR;  Service: Orthopedics;  Laterality: Right;     Family History   Problem Relation Age of Onset    Cancer Maternal Grandfather        Marital Status:   Alcohol History:  reports no history of alcohol use.  Tobacco History:  reports that he  "has never smoked. He has never used smokeless tobacco.  Drug History:  reports no history of drug use.    Review of patient's allergies indicates:  No Known Allergies    Current Outpatient Medications:     multivitamin (ONE DAILY MULTIVITAMIN) per tablet, Take 1 tablet by mouth once daily., Disp: , Rfl:     omeprazole (PRILOSEC) 40 MG capsule, Take 1 capsule (40 mg total) by mouth once daily., Disp: 90 capsule, Rfl: 3    Review of Systems   Constitutional: Negative for activity change.   HENT: Negative for congestion.    Respiratory: Negative for cough and shortness of breath.    Cardiovascular: Positive for leg swelling. Negative for chest pain.          Objective:      Vitals:    22 1112   BP: 126/80   Pulse: 74   SpO2: 97%   Weight: 105.5 kg (232 lb 9.6 oz)   Height: 5' 9" (1.753 m)     Physical Exam  Constitutional:       General: He is not in acute distress.     Appearance: He is well-developed. He is obese.   HENT:      Head: Normocephalic and atraumatic.   Eyes:      Pupils: Pupils are equal, round, and reactive to light.   Neck:      Thyroid: No thyromegaly.   Cardiovascular:      Rate and Rhythm: Normal rate and regular rhythm.      Heart sounds: Normal heart sounds.      Comments: Negative Homans sign, no erythema or warmth appreciated.  Pulmonary:      Effort: Pulmonary effort is normal.      Breath sounds: Normal breath sounds.   Abdominal:      General: Bowel sounds are normal. There is no distension.      Palpations: Abdomen is soft.      Tenderness: There is no abdominal tenderness.   Musculoskeletal:         General: Normal range of motion.      Cervical back: Normal range of motion and neck supple.      Right lower le+ Edema present.      Left lower leg: No edema.   Skin:     General: Skin is warm and dry.      Findings: No erythema or rash.   Neurological:      Mental Status: He is alert and oriented to person, place, and time.      Cranial Nerves: No cranial nerve deficit.       "     Assessment:       1. Gastroesophageal reflux disease, unspecified whether esophagitis present    2. Pain and swelling of right knee    3. Pain and swelling of right lower extremity         Plan:       Gastroesophageal reflux disease, unspecified whether esophagitis present  Comments:  Refills of PPI as needed  Orders:  -     omeprazole (PRILOSEC) 40 MG capsule; Take 1 capsule (40 mg total) by mouth once daily.  Dispense: 90 capsule; Refill: 3    Pain and swelling of right knee  -     X-Ray Knee 3 View Right; Future; Expected date: 07/22/2022    Pain and swelling of right lower extremity  Comments:  Lower suspicion for DVT but with unilateral leg swelling and slight pain worsening will check ultrasound now to rule out.  Continue compression/low sodium diet  Orders:  -     US Lower Extremity Veins Right; Future; Expected date: 07/22/2022      Follow up if symptoms worsen or fail to improve, for Pending imaging.        7/22/2022 Randy Anand PA-C       hand

## 2022-07-22 NOTE — TELEPHONE ENCOUNTER
----- Message from Carli Ballesteros sent at 7/22/2022 10:40 AM CDT -----  Patient wife (Sabino) called and stated that he had surgery on his right meniscus about three years ago and now its real swollen and she would like to know if he need to make an appointment here or with the doctor who did the surgery  no injury to the knee please give her a call at [phone]

## 2023-06-13 ENCOUNTER — OFFICE VISIT (OUTPATIENT)
Dept: PODIATRY | Facility: CLINIC | Age: 53
End: 2023-06-13
Payer: COMMERCIAL

## 2023-06-13 VITALS — OXYGEN SATURATION: 97 % | HEART RATE: 65 BPM | WEIGHT: 221 LBS | BODY MASS INDEX: 32.64 KG/M2

## 2023-06-13 DIAGNOSIS — M79.671 RIGHT FOOT PAIN: ICD-10-CM

## 2023-06-13 DIAGNOSIS — M21.619 BUNION: ICD-10-CM

## 2023-06-13 DIAGNOSIS — D23.71 BENIGN NEOPLASM OF SKIN OF RIGHT LOWER EXTREMITY, INCLUDING HIP: Primary | ICD-10-CM

## 2023-06-13 PROCEDURE — 17110 PR DESTRUCTION BENIGN LESIONS UP TO 14: ICD-10-PCS | Mod: S$GLB,,, | Performed by: PODIATRIST

## 2023-06-13 PROCEDURE — 99203 PR OFFICE/OUTPT VISIT, NEW, LEVL III, 30-44 MIN: ICD-10-PCS | Mod: 25,S$GLB,, | Performed by: PODIATRIST

## 2023-06-13 PROCEDURE — 99999 PR PBB SHADOW E&M-EST. PATIENT-LVL III: ICD-10-PCS | Mod: PBBFAC,,, | Performed by: PODIATRIST

## 2023-06-13 PROCEDURE — 17110 DESTRUCTION B9 LES UP TO 14: CPT | Mod: PBBFAC,PN | Performed by: PODIATRIST

## 2023-06-13 PROCEDURE — 99213 OFFICE O/P EST LOW 20 MIN: CPT | Mod: PBBFAC,PN | Performed by: PODIATRIST

## 2023-06-13 PROCEDURE — 99999 PR PBB SHADOW E&M-EST. PATIENT-LVL III: CPT | Mod: PBBFAC,,, | Performed by: PODIATRIST

## 2023-06-13 PROCEDURE — 99203 OFFICE O/P NEW LOW 30 MIN: CPT | Mod: 25,S$GLB,, | Performed by: PODIATRIST

## 2023-06-13 PROCEDURE — 17110 DESTRUCTION B9 LES UP TO 14: CPT | Mod: S$GLB,,, | Performed by: PODIATRIST

## 2023-06-13 NOTE — PROGRESS NOTES
[address] [facility] [address]  [place] [address]  Phone: [phone]   [phone]    Patient's PCP:Elliott Robert MD  Referring Provider: Aaareferral Self    Subjective:      Chief Complaint:: Plantar Warts (On lateral side of right foot)    CHRIS Ca is a 52 y.o. male who presents today with a complaint of possible planter warts on lateral side of right foot lasting for 4 months. Onset of symptoms unknown  and reports no trauma.  Current symptoms include pain and discomfort.  Aggravating factors are pressure. Symptoms have worsened. No treatments done.        Vitals:    06/13/23 1513   Pulse: 65   SpO2: 97%   Weight: 100.2 kg (221 lb)   PainSc:   6      Shoe Size: 9.5-10    Past Surgical History:   Procedure Laterality Date    ARTHROSCOPY OF KNEE Right 7/16/2019    Procedure: ARTHROSCOPY, KNEE;  Surgeon: Ganesh Berry MD;  Location: [facility] OR;  Service: Orthopedics;  Laterality: Right;    HAND TENDON SURGERY Left 1992    HERNIA REPAIR Right 04/12/2017    with mesh    REPAIR OF MENISCUS OF KNEE Right 7/16/2019    Procedure: REPAIR, MENISCUS, KNEE;  Surgeon: Ganesh Berry MD;  Location: [facility] OR;  Service: Orthopedics;  Laterality: Right;     History reviewed. No pertinent past medical history.  Family History   Problem Relation Age of Onset    Cancer Maternal Grandfather         Social History:   Marital Status:   Alcohol History:  reports no history of alcohol use.  Tobacco History:  reports that he has never smoked. He has never used smokeless tobacco.  Drug History:  reports no history of drug use.    Review of patient's allergies indicates:  No Known Allergies    Current Outpatient Medications   Medication Sig Dispense Refill    multivitamin (ONE DAILY MULTIVITAMIN) per tablet Take 1 tablet by mouth once daily.      omeprazole (PRILOSEC) 40 MG capsule Take 1 capsule (40 mg total) by mouth once daily. 90 capsule 3     No current facility-administered medications for this  visit.       Review of Systems   Constitutional:  Negative for chills, fatigue, fever and unexpected weight change.   HENT:  Negative for hearing loss and trouble swallowing.    Eyes:  Negative for photophobia and visual disturbance.   Respiratory:  Negative for cough, shortness of breath and wheezing.    Cardiovascular:  Negative for chest pain, palpitations and leg swelling.   Gastrointestinal:  Negative for abdominal pain and nausea.   Genitourinary:  Negative for dysuria and frequency.   Musculoskeletal:  Positive for arthralgias. Negative for back pain, gait problem, joint swelling and myalgias.   Skin:  Negative for rash and wound.   Neurological:  Negative for tremors, seizures, speech difficulty, weakness, numbness and headaches.   Hematological:  Does not bruise/bleed easily.       Objective:        Physical Exam:   Foot Exam    General  General Appearance: appears stated age and healthy   Orientation: alert and oriented to person, place, and time   Affect: appropriate   Gait: unimpaired       Right Foot/Ankle     Inspection and Palpation  Ecchymosis: none  Tenderness: fifth metatarsal base tenderness and great toe metatarsophalangeal joint   Swelling: none   Arch: normal  Hallux valgus: yes  Skin Exam: callus; no drainage, no ulcer and no erythema   Neurovascular  Dorsalis pedis: 2+  Posterior tibial: 2+  Capillary Refill: 2+  Varicose veins: not present  Saphenous nerve sensation: normal  Tibial nerve sensation: normal  Superficial peroneal nerve sensation: normal  Deep peroneal nerve sensation: normal  Sural nerve sensation: normal    Edema  Type of edema: non-pitting    Muscle Strength  Ankle dorsiflexion: 5  Ankle plantar flexion: 5  Ankle inversion: 5  Ankle eversion: 5  Great toe extension: 5  Great toe flexion: 5    Range of Motion    Normal right ankle ROM    Tests  Anterior drawer: negative   Talar tilt: negative   PT Tinel's sign: negative    Paresthesia: negative    Left Foot/Ankle       Inspection and Palpation  Ecchymosis: none  Tenderness: great toe metatarsophalangeal joint   Swelling: none   Arch: normal  Hallux valgus: yes  Skin Exam: skin intact; no drainage, no ulcer and no erythema   Neurovascular  Dorsalis pedis: 2+  Posterior tibial: 2+  Capillary refill: 2+  Varicose veins: not present  Saphenous nerve sensation: normal  Tibial nerve sensation: normal  Superficial peroneal nerve sensation: normal  Deep peroneal nerve sensation: normal  Sural nerve sensation: normal    Edema  Type of edema: non-pitting    Muscle Strength  Ankle dorsiflexion: 5  Ankle plantar flexion: 5  Ankle inversion: 5  Ankle eversion: 5  Great toe extension: 5  Great toe flexion: 5    Range of Motion    Normal left ankle ROM    Tests  Anterior drawer: negative   Talar tilt: negative   PT Tinel's sign: negative  Paresthesia: negative    Physical Exam  Cardiovascular:      Pulses:           Dorsalis pedis pulses are 2+ on the right side and 2+ on the left side.        Posterior tibial pulses are 2+ on the right side and 2+ on the left side.   Musculoskeletal:      Right foot: Bunion present.      Left foot: Bunion present.        Feet:    Feet:      Right foot:      Skin integrity: Callus present. No ulcer or erythema.      Left foot:      Skin integrity: No ulcer or erythema.             Right Ankle/Foot Exam     Tenderness   The patient is tender to palpation of the great toe metatarsophalangeal joint.    Range of Motion   The patient has normal right ankle ROM.    Left Ankle/Foot Exam     Tenderness   The patient is tender to palpation of the great toe metatarsophalangeal joint.    Range of Motion   The patient has normal left ankle ROM.       Muscle Strength   Right Lower Extremity   Ankle Dorsiflexion:  5   Plantar flexion:  5/5  Left Lower Extremity   Ankle Dorsiflexion:  5   Plantar flexion:  5/5     Vascular Exam     Right Pulses  Dorsalis Pedis:      2+  Posterior Tibial:      2+        Left  Pulses  Dorsalis Pedis:      2+  Posterior Tibial:      2+         Imaging: none            Assessment:       1. Benign neoplasm of skin of right lower extremity, including hip    2. Right foot pain    3. Bunion      Plan:   Benign neoplasm of skin of right lower extremity, including hip    Right foot pain    Bunion      Follow up in about 2 weeks (around 6/27/2023), or if symptoms worsen or fail to improve.    Procedures Cantharone treatment of benign skin lesions - multiple lesions lateral right foot. Informed consent was obtained, a time-out was called, hyperkeratotic skin was debrided from each lesion utilizing a scapel, Cantharone acid was applied, and was covered with a Band-Aid. Written and verbal instructions were given to the patient. Patient tolerated the procedure well.     Instructions After Cantharone Acid Treatment    Keep dry for 3 days  If a blister forms, pop it  After 3rd day, begin wart stick twice daily for two week  Follow-up appointment 2 weeks      Wart Treatment: Twice Daily    Bathe area at night  File with pumice stone or nail file  Apply wart stick to affected area  Cover with a bad aid            Counseling:     I provided patient education verbally regarding:   Patient diagnosis, treatment options, as well as alternatives, risks, and benefits.     benign skin lesion:  I explained the lesion to the pt. and the treatment options of 1)  periodic debridement and off loading of the lesion.  2)  Canthrone treatment plan,  3)  Curretage of the lesion.  I explained there was no guarantee with any of the treatments that the lesion would comletely resolve or not reoccur.    I provided patient education regarding: Bunion deformity and causes. I discussed conservative treatment with shoe modification, pads, treating symptoms with OTC NSAIDs, pads between toes, inserts and pads over the bunion. I explained that conservative treatment is non-curative but may help with pain and slow down the  progression of the deformity.       This note was created using Dragon voice recognition software that occasionally misinterpreted phrases or words.

## 2023-10-06 DIAGNOSIS — K21.9 GASTROESOPHAGEAL REFLUX DISEASE, UNSPECIFIED WHETHER ESOPHAGITIS PRESENT: ICD-10-CM

## 2023-10-06 RX ORDER — OMEPRAZOLE 40 MG/1
40 CAPSULE, DELAYED RELEASE ORAL DAILY
Qty: 90 CAPSULE | Refills: 3 | Status: SHIPPED | OUTPATIENT
Start: 2023-10-06 | End: 2024-10-05

## 2023-10-06 NOTE — TELEPHONE ENCOUNTER
----- Message from Chelsie Doll sent at 10/6/2023  9:15 AM CDT -----  Refill Omeprazole [facility] [place]. Pt's # [phone]

## 2024-05-01 ENCOUNTER — OCCUPATIONAL HEALTH (OUTPATIENT)
Dept: URGENT CARE | Facility: CLINIC | Age: 54
End: 2024-05-01

## 2024-05-01 DIAGNOSIS — Z00.00 PHYSICAL EXAM, ANNUAL: Primary | ICD-10-CM

## 2024-05-01 DIAGNOSIS — Z00.00 ROUTINE GENERAL MEDICAL EXAMINATION AT A HEALTH CARE FACILITY: ICD-10-CM

## 2024-05-01 PROCEDURE — 80053 COMPREHEN METABOLIC PANEL: CPT | Mod: QW,S$GLB,,

## 2024-05-01 PROCEDURE — 99499 UNLISTED E&M SERVICE: CPT | Mod: S$GLB,,,

## 2024-05-01 PROCEDURE — 84153 ASSAY OF PSA TOTAL: CPT | Mod: S$GLB,,,

## 2024-05-01 PROCEDURE — 82270 OCCULT BLOOD FECES: CPT | Mod: S$GLB,,,

## 2024-05-01 PROCEDURE — 94010 BREATHING CAPACITY TEST: CPT | Mod: S$GLB,,,

## 2024-05-01 PROCEDURE — 93000 ELECTROCARDIOGRAM COMPLETE: CPT | Mod: S$GLB,,,

## 2024-05-01 PROCEDURE — 85025 COMPLETE CBC W/AUTO DIFF WBC: CPT | Mod: QW,S$GLB,,

## 2024-05-01 PROCEDURE — 80305 DRUG TEST PRSMV DIR OPT OBS: CPT | Mod: S$GLB,,,

## 2024-05-01 PROCEDURE — 92552 PURE TONE AUDIOMETRY AIR: CPT | Mod: S$GLB,,,

## 2024-05-01 PROCEDURE — 71046 X-RAY EXAM CHEST 2 VIEWS: CPT | Mod: S$GLB,,, | Performed by: RADIOLOGY

## 2024-05-01 PROCEDURE — 99080 SPECIAL REPORTS OR FORMS: CPT | Mod: S$GLB,,,

## 2024-05-01 PROCEDURE — 86580 TB INTRADERMAL TEST: CPT | Mod: S$GLB,,,

## 2024-05-01 PROCEDURE — 83036 HEMOGLOBIN GLYCOSYLATED A1C: CPT | Mod: QW,S$GLB,,

## 2024-05-01 PROCEDURE — 80061 LIPID PANEL: CPT | Mod: S$GLB,,,

## 2024-05-01 PROCEDURE — 86701 HIV-1ANTIBODY: CPT | Mod: QW,S$GLB,,

## 2024-05-01 PROCEDURE — 99172 OCULAR FUNCTION SCREEN: CPT | Mod: S$GLB,,,

## 2024-05-02 LAB
ALBUMIN SERPL-MCNC: 4.7 G/DL (ref 3.8–4.9)
ALBUMIN/GLOB SERPL: 1.7 {RATIO} (ref 1.2–2.2)
ALP SERPL-CCNC: 117 IU/L (ref 44–121)
ALT SERPL-CCNC: 25 IU/L (ref 0–44)
AST SERPL-CCNC: 27 IU/L (ref 0–40)
BASOPHILS # BLD AUTO: 0 X10E3/UL (ref 0–0.2)
BASOPHILS NFR BLD AUTO: 1 %
BILIRUB SERPL-MCNC: 0.6 MG/DL (ref 0–1.2)
BUN SERPL-MCNC: 14 MG/DL (ref 6–24)
BUN/CREAT SERPL: 14 (ref 9–20)
CALCIUM SERPL-MCNC: 10.2 MG/DL (ref 8.7–10.2)
CHLORIDE SERPL-SCNC: 103 MMOL/L (ref 96–106)
CHOLEST SERPL-MCNC: 218 MG/DL (ref 100–199)
CO2 SERPL-SCNC: 21 MMOL/L (ref 20–29)
CREAT SERPL-MCNC: 1.03 MG/DL (ref 0.76–1.27)
EOSINOPHIL # BLD AUTO: 0.1 X10E3/UL (ref 0–0.4)
EOSINOPHIL NFR BLD AUTO: 2 %
ERYTHROCYTE [DISTWIDTH] IN BLOOD BY AUTOMATED COUNT: 12.3 % (ref 11.6–15.4)
EST. GFR  (NO RACE VARIABLE): 86 ML/MIN/1.73
GLOBULIN SER CALC-MCNC: 2.7 G/DL (ref 1.5–4.5)
GLUCOSE SERPL-MCNC: 95 MG/DL (ref 70–99)
HBA1C MFR BLD: 6.2 % (ref 4.8–5.6)
HCT VFR BLD AUTO: 45 % (ref 37.5–51)
HDLC SERPL-MCNC: 82 MG/DL
HGB BLD-MCNC: 14.6 G/DL (ref 13–17.7)
HIV 1+2 AB+HIV1 P24 AG SERPL QL IA: NON REACTIVE
IMM GRANULOCYTES # BLD AUTO: 0 X10E3/UL (ref 0–0.1)
IMM GRANULOCYTES NFR BLD AUTO: 0 %
LDLC SERPL CALC-MCNC: 126 MG/DL (ref 0–99)
LYMPHOCYTES # BLD AUTO: 1.9 X10E3/UL (ref 0.7–3.1)
LYMPHOCYTES NFR BLD AUTO: 30 %
MCH RBC QN AUTO: 30 PG (ref 26.6–33)
MCHC RBC AUTO-ENTMCNC: 32.4 G/DL (ref 31.5–35.7)
MCV RBC AUTO: 93 FL (ref 79–97)
MONOCYTES # BLD AUTO: 0.5 X10E3/UL (ref 0.1–0.9)
MONOCYTES NFR BLD AUTO: 8 %
NEUTROPHILS # BLD AUTO: 3.7 X10E3/UL (ref 1.4–7)
NEUTROPHILS NFR BLD AUTO: 59 %
PLATELET # BLD AUTO: 247 X10E3/UL (ref 150–450)
POTASSIUM SERPL-SCNC: 5.3 MMOL/L (ref 3.5–5.2)
PROT SERPL-MCNC: 7.4 G/DL (ref 6–8.5)
PSA SERPL-MCNC: 1.2 NG/ML (ref 0–4)
RBC # BLD AUTO: 4.86 X10E6/UL (ref 4.14–5.8)
SODIUM SERPL-SCNC: 144 MMOL/L (ref 134–144)
TRIGL SERPL-MCNC: 57 MG/DL (ref 0–149)
VLDLC SERPL CALC-MCNC: 10 MG/DL (ref 5–40)
WBC # BLD AUTO: 6.3 X10E3/UL (ref 3.4–10.8)

## 2024-08-06 ENCOUNTER — HOSPITAL ENCOUNTER (OUTPATIENT)
Dept: RADIOLOGY | Facility: CLINIC | Age: 54
Discharge: HOME | End: 2024-08-06
Attending: PODIATRIST
Payer: COMMERCIAL

## 2024-08-06 ENCOUNTER — OFFICE VISIT (OUTPATIENT)
Dept: PODIATRY | Facility: CLINIC | Age: 54
End: 2024-08-06
Payer: COMMERCIAL

## 2024-08-06 VITALS — WEIGHT: 234.56 LBS | BODY MASS INDEX: 34.74 KG/M2 | HEIGHT: 69 IN

## 2024-08-06 DIAGNOSIS — M79.671 BILATERAL FOOT PAIN: ICD-10-CM

## 2024-08-06 DIAGNOSIS — M21.619 BUNION: Primary | ICD-10-CM

## 2024-08-06 DIAGNOSIS — M79.671 RIGHT FOOT PAIN: ICD-10-CM

## 2024-08-06 DIAGNOSIS — M79.672 BILATERAL FOOT PAIN: ICD-10-CM

## 2024-08-06 PROCEDURE — 3008F BODY MASS INDEX DOCD: CPT | Mod: CPTII,S$GLB,, | Performed by: PODIATRIST

## 2024-08-06 PROCEDURE — 99213 OFFICE O/P EST LOW 20 MIN: CPT | Mod: S$GLB,,, | Performed by: PODIATRIST

## 2024-08-06 PROCEDURE — 1160F RVW MEDS BY RX/DR IN RCRD: CPT | Mod: CPTII,S$GLB,, | Performed by: PODIATRIST

## 2024-08-06 PROCEDURE — 99999 PR PBB SHADOW E&M-EST. PATIENT-LVL III: CPT | Mod: PBBFAC,,, | Performed by: PODIATRIST

## 2024-08-06 PROCEDURE — 1159F MED LIST DOCD IN RCRD: CPT | Mod: CPTII,S$GLB,, | Performed by: PODIATRIST

## 2024-08-06 PROCEDURE — 3044F HG A1C LEVEL LT 7.0%: CPT | Mod: CPTII,S$GLB,, | Performed by: PODIATRIST

## 2024-08-06 PROCEDURE — 73630 X-RAY EXAM OF FOOT: CPT | Mod: S$GLB,,, | Performed by: RADIOLOGY

## 2024-08-07 ENCOUNTER — OFFICE VISIT (OUTPATIENT)
Dept: PODIATRY | Facility: CLINIC | Age: 54
End: 2024-08-07
Payer: COMMERCIAL

## 2024-08-07 VITALS — HEIGHT: 69 IN | WEIGHT: 234.56 LBS | BODY MASS INDEX: 34.74 KG/M2

## 2024-08-07 DIAGNOSIS — M20.11 HALLUX VALGUS OF RIGHT FOOT: Primary | ICD-10-CM

## 2024-08-07 DIAGNOSIS — M20.41 HAMMER TOES OF BOTH FEET: ICD-10-CM

## 2024-08-07 DIAGNOSIS — M20.42 HAMMER TOES OF BOTH FEET: ICD-10-CM

## 2024-08-07 DIAGNOSIS — M20.11 VALGUS DEFORMITY OF BOTH GREAT TOES: ICD-10-CM

## 2024-08-07 DIAGNOSIS — Q66.222 METATARSUS ADDUCTUS OF BOTH FEET: ICD-10-CM

## 2024-08-07 DIAGNOSIS — Q66.221 METATARSUS ADDUCTUS OF BOTH FEET: ICD-10-CM

## 2024-08-07 DIAGNOSIS — M79.671 BILATERAL FOOT PAIN: ICD-10-CM

## 2024-08-07 DIAGNOSIS — M20.12 VALGUS DEFORMITY OF BOTH GREAT TOES: ICD-10-CM

## 2024-08-07 DIAGNOSIS — M79.672 BILATERAL FOOT PAIN: ICD-10-CM

## 2024-08-07 PROCEDURE — 99999 PR PBB SHADOW E&M-EST. PATIENT-LVL V: CPT | Mod: PBBFAC,,,

## 2024-08-07 RX ORDER — ERGOCALCIFEROL 1.25 MG/1
50000 CAPSULE ORAL
Qty: 4 CAPSULE | Refills: 2 | Status: SHIPPED | OUTPATIENT
Start: 2024-08-07

## 2024-08-07 NOTE — H&P (VIEW-ONLY)
"  [address] [facility] [address]  [place] [address]  Phone: [phone]   [phone]    Patient's PCP:Elliott Robert MD  Referring Provider: No ref. provider found    Subjective:      Chief Complaint:: Bunions (SX consult)    CHRIS Ca is a 53 y.o. male who presents today with a complaint of bunion SX consult RT>LT. The current episode started 1 year.  The symptoms include throbbing pain. The symptoms are aggravated by shoes, standing. The symptoms have progressively worsened. Treatment to date have included shoe modifications, OTC inserts which provided no relief.     Vitals:    08/07/24 1347   Weight: 106.4 kg (234 lb 9.1 oz)   Height: 5' 9" (1.753 m)   PainSc:   2      Shoe Size: 10    Past Surgical History:   Procedure Laterality Date    ARTHROSCOPY OF KNEE Right 7/16/2019    Procedure: ARTHROSCOPY, KNEE;  Surgeon: Ganesh Berry MD;  Location: [facility] OR;  Service: Orthopedics;  Laterality: Right;    HAND TENDON SURGERY Left 1992    HERNIA REPAIR Right 04/12/2017    with mesh    REPAIR OF MENISCUS OF KNEE Right 7/16/2019    Procedure: REPAIR, MENISCUS, KNEE;  Surgeon: Ganesh Berry MD;  Location: [facility] OR;  Service: Orthopedics;  Laterality: Right;     History reviewed. No pertinent past medical history.  Family History   Problem Relation Name Age of Onset    Cancer Maternal Grandfather          Social History:   Marital Status:   Alcohol History:  reports no history of alcohol use.  Tobacco History:  reports that he has never smoked. He has never used smokeless tobacco.  Drug History:  reports no history of drug use.    Review of patient's allergies indicates:  No Known Allergies    Current Outpatient Medications   Medication Sig Dispense Refill    ergocalciferol (ERGOCALCIFEROL) 50,000 unit Cap Take 1 capsule (50,000 Units total) by mouth every 7 days. 4 capsule 2    multivitamin (ONE DAILY MULTIVITAMIN) per tablet Take 1 tablet by mouth once daily.      omeprazole " (PRILOSEC) 40 MG capsule Take 1 capsule (40 mg total) by mouth once daily. 90 capsule 3     No current facility-administered medications for this visit.       Review of Systems   Constitutional:  Negative for chills, fatigue, fever and unexpected weight change.   HENT:  Negative for hearing loss and trouble swallowing.    Eyes:  Negative for photophobia and visual disturbance.   Respiratory:  Negative for cough, shortness of breath and wheezing.    Cardiovascular:  Negative for chest pain, palpitations and leg swelling.   Gastrointestinal:  Negative for abdominal pain and nausea.   Genitourinary:  Negative for dysuria and frequency.   Musculoskeletal:  Negative for arthralgias, back pain, gait problem, joint swelling, myalgias and neck pain.   Skin:  Negative for rash and wound.   Neurological:  Negative for tremors, seizures, weakness, numbness and headaches.   Hematological:  Does not bruise/bleed easily.   Psychiatric/Behavioral:  Negative for hallucinations.          Objective:        Physical Exam:   Foot Exam    General  General Appearance: appears stated age and healthy   Orientation: alert and oriented to person, place, and time   Affect: appropriate       Right Foot/Ankle     Inspection and Palpation  Ecchymosis: none  Tenderness: (1st metatarsal distal medial prominence)  Swelling: none   Arch: pes planus  Hammertoes: second toe  Hallux valgus: yes  Hallux limitus: no  Skin Exam: skin intact;   Fungus Toenails: not present  Neurovascular  Dorsalis pedis: 3+  Posterior tibial: 3+  Capillary Refill: 3+  Varicose veins: not present  Saphenous nerve sensation: normal  Tibial nerve sensation: normal  Superficial peroneal nerve sensation: normal  Deep peroneal nerve sensation: normal  Sural nerve sensation: normal    Muscle Strength  Ankle dorsiflexion: 5  Ankle plantar flexion: 5  Ankle inversion: 5  Ankle eversion: 5  Great toe extension: 5  Great toe flexion: 5    Range of Motion    Passive  Ankle  dorsiflexion: 5      Tests  PT Tinel's sign: negative    Heel raise: normal control   Valleix sign: negative  Comments  HAV  - Tracking   - 1st MTPJ ROM WNL, Smooth, without crepitation or pain   - Hypermobility of the 1st ray with foot loaded appreciated; 7 mm dorsiflexion, 7 mm Plantarflexion     Digital contracture 2nd      AJ ROM 5 degrees knee extended, 7 degrees knee flexed     Left Foot/Ankle      Inspection and Palpation  Ecchymosis: none  Tenderness: (1st metatarsal distal medial prominence)  Arch: pes planus  Hammertoes: second toe  Hallux valgus: yes  Hallux limitus: no  Skin Exam: skin intact;   Fungus Toenails: not present    Neurovascular  Dorsalis pedis: 3+  Posterior tibial: 3+  Capillary refill: 3+  Varicose veins: not present  Saphenous nerve sensation: normal  Tibial nerve sensation: normal  Superficial peroneal nerve sensation: normal  Deep peroneal nerve sensation: normal  Sural nerve sensation: normal    Muscle Strength  Ankle dorsiflexion: 5  Ankle plantar flexion: 5  Ankle inversion: 5  Ankle eversion: 5  Great toe extension: 5  Great toe flexion: 5    Range of Motion    Passive  Ankle dorsiflexion: 5      Tests  PT Tinel's sign: negative  Heel raise: normal control Valleix sign: negative  Comments  HAV  - Tracking   - 1st MTPJ ROM WNL, Smooth, without crepitation or pain   - Hypermobility of the 1st ray with foot loaded appreciated; 7 mm dorsiflexion, 7 mm Plantarflexion     Digital contracture 2nd      AJ ROM 5 degrees knee extended, 7 degrees knee flexed         [id number] Bilateral foot xray  Radiology read: No fracture or dislocation. There is moderate hallux valgus with bunion formation bilaterally. There are bilateral hammertoes.    Independent read: Metatarsal adductus b/l, HAV b/l, Hammertoes b/l, atavistic 1st cuneiforms, mild TN un coverage b/l, minor eburnation at the bases of the 1st proximal phalanxes b/l    Metarsus adductus angle: 17 degrees   Mamadou's Angle: Aprox  36 degrees   True 1st IM angle: 11 + 17 -15 = 13 degrees          Assessment:       1. Valgus deformity of both great toes    2. Metatarsus adductus of both feet    3. Bilateral foot pain    4. Hammer toes of both feet      Plan:   Valgus deformity of both great toes    Metatarsus adductus of both feet    Bilateral foot pain    Hammer toes of both feet    Other orders  -     ergocalciferol (ERGOCALCIFEROL) 50,000 unit Cap; Take 1 capsule (50,000 Units total) by mouth every 7 days.  Dispense: 4 capsule; Refill: 2      I provided patient education verbally regarding: Patient diagnosis, treatment options, as well as alternatives, risks, and benefits.     Surgical consult for patient with b/l painful HAV, Met adductus, and hammer toes. Conservative measures failed include shoe gear modifications, OTC orthotics, toe spacers, padding/strapping.     Reviewed Bilateral foot radiographs with patient obtained this week in clinic in addition to labs preformed in May 2024 (CBC, CMP, A1C, EKG, CXR)    Long discussion with patient regarding the procedure in detail. Patient understands all risks, potential complications, and alternatives, including, but not limited to those listed on the consent form. All questions were answered. No guarantees given or implied as to outcome.     Plan for Right foot Lapidus Bunionectomy, potential akin osteotomy, arthrodesis of 2nd and 3rd TMTJ to correct met adductus, and R 2nd digit hammertoe correction/tendon capsule balancing.     Order placed for surgical intervention.     Anticipate surgery for 08/16/24.     NPO Midnight 08/16/24    Anesthesia: Local / MAC ; Exparil at procedure end for extended acute post operative pain relief    Weightbearing status for surgery post op: Non WB in Short CAM walker boot with crutches     RTC 1 week after surgery and advised to call or come in sooner (prior to surgery date) for any further questions or concerns.     (This note was created using voice  recognition software that occasionally misinterpreted phrases or words.)    Follow up in about 15 days (around 8/22/2024).      Bebeto Gonzalez DPM  Podiatry / Foot and Ankle Surgery   Secure chat preferred

## 2024-08-08 RX ORDER — SODIUM CHLORIDE 0.9 % (FLUSH) 0.9 %
10 SYRINGE (ML) INJECTION
Status: SHIPPED | OUTPATIENT
Start: 2024-08-16

## 2024-08-08 RX ORDER — CEFAZOLIN SODIUM 2 G/50ML
2 SOLUTION INTRAVENOUS
OUTPATIENT
Start: 2024-08-08

## 2024-08-08 RX ORDER — SODIUM CHLORIDE 0.9 % (FLUSH) 0.9 %
SYRINGE (ML) INJECTION
Status: CANCELLED | OUTPATIENT
Start: 2024-08-08

## 2024-08-08 RX ORDER — CLINDAMYCIN PHOSPHATE 900 MG/50ML
900 INJECTION, SOLUTION INTRAVENOUS
OUTPATIENT
Start: 2024-08-16

## 2024-08-08 RX ORDER — CEFAZOLIN SODIUM 2 G/50ML
2 SOLUTION INTRAVENOUS
Status: CANCELLED | OUTPATIENT
Start: 2024-08-08

## 2024-08-12 ENCOUNTER — TELEPHONE (OUTPATIENT)
Dept: PODIATRY | Facility: CLINIC | Age: 54
End: 2024-08-12
Payer: COMMERCIAL

## 2024-08-12 NOTE — TELEPHONE ENCOUNTER
----- Message from Sydney Matthews LPN sent at 8/12/2024  7:33 AM CDT -----  Regarding: MD not in network  Good morning,   The MD listed on this case is not coming up on [facility] list of MD's that are covered.  Is this an external provider?  Thank you,   Sydney Matthews LPN  [facility]

## 2024-08-14 ENCOUNTER — TELEPHONE (OUTPATIENT)
Dept: PODIATRY | Facility: CLINIC | Age: 54
End: 2024-08-14
Payer: COMMERCIAL

## 2024-08-15 ENCOUNTER — HOSPITAL ENCOUNTER (OUTPATIENT)
Dept: RADIOLOGY | Facility: HOSPITAL | Age: 54
Discharge: HOME | End: 2024-08-15
Payer: COMMERCIAL

## 2024-08-15 ENCOUNTER — TELEPHONE (OUTPATIENT)
Dept: PODIATRY | Facility: CLINIC | Age: 54
End: 2024-08-15
Payer: COMMERCIAL

## 2024-08-15 ENCOUNTER — HOSPITAL ENCOUNTER (OUTPATIENT)
Dept: PREADMISSION TESTING | Facility: HOSPITAL | Age: 54
Discharge: HOME | End: 2024-08-15
Payer: COMMERCIAL

## 2024-08-15 VITALS
BODY MASS INDEX: 34.07 KG/M2 | OXYGEN SATURATION: 97 % | WEIGHT: 230 LBS | HEIGHT: 69 IN | SYSTOLIC BLOOD PRESSURE: 136 MMHG | RESPIRATION RATE: 16 BRPM | TEMPERATURE: 99 F | HEART RATE: 69 BPM | DIASTOLIC BLOOD PRESSURE: 81 MMHG

## 2024-08-15 DIAGNOSIS — Z01.818 PRE-OPERATIVE EXAMINATION: ICD-10-CM

## 2024-08-15 DIAGNOSIS — M20.11 HALLUX VALGUS OF RIGHT FOOT: ICD-10-CM

## 2024-08-15 LAB
OHS QRS DURATION: 102 MS
OHS QTC CALCULATION: 408 MS

## 2024-08-15 PROCEDURE — 71046 X-RAY EXAM CHEST 2 VIEWS: CPT | Mod: TC

## 2024-08-15 PROCEDURE — 71046 X-RAY EXAM CHEST 2 VIEWS: CPT | Mod: 26,,, | Performed by: RADIOLOGY

## 2024-08-15 RX ORDER — CLINDAMYCIN PHOSPHATE 900 MG/50ML
900 INJECTION, SOLUTION INTRAVENOUS
Status: DISCONTINUED | OUTPATIENT
Start: 2024-08-16 | End: 2024-08-16 | Stop reason: HOSPADM

## 2024-08-15 RX ORDER — CEFAZOLIN SODIUM 2 G/50ML
2 SOLUTION INTRAVENOUS
Status: DISCONTINUED | OUTPATIENT
Start: 2024-08-15 | End: 2024-08-16 | Stop reason: HOSPADM

## 2024-08-15 NOTE — PRE-PROCEDURE INSTRUCTIONS
Sona @ Dr. Gonzalez's office messaged re: patient requesting knee scooter for surgery tomorrow. Sona said she would call patient.

## 2024-08-15 NOTE — TELEPHONE ENCOUNTER
----- Message from Jamia Mcdaniel sent at 8/14/2024  8:48 AM CDT -----  Regarding: Pt voicemail  Patient left a voicemail asking for you to please call him back about his surgery on Friday. His number is [phone].    Thank you,  Jamia

## 2024-08-15 NOTE — DISCHARGE INSTRUCTIONS
To confirm, Your doctor has instructed you that surgery is scheduled for: Tomorrow- Be at registration 6:50 am         1 Person can come with you the day of surgery.    Please park in the Garage Parking and come through front entrance.      GO TO REGISTRATION     After registration, proceed past gift shop and through glass door ( Outpatient [place]) Check in at the nurses station to the left.   Do not eat or drink anything after midnight the night before your surgery - THIS INCLUDES  WATER, GUM, MINTS AND CANDY.  YOU MAY BRUSH YOUR TEETH BUT DO NOT SWALLOW     TAKE ONLY THESE MEDICATIONS WITH A SMALL SIP OF WATER THE MORNING OF YOUR PROCEDURE: Omeprazole        PLEASE NOTE:  The surgery schedule has many variables which may affect the time of your surgery case.  Family members should be available if your surgery time changes.  Plan to be here the day of your procedure between 4-6 hours.      DO NOT TAKE THESE MEDICATIONS 5-7 DAYS PRIOR to your procedure or per your surgeon's request: ASPIRIN, ALEVE, ADVIL, IBUPROFEN,  NAVJOT SELTZER, BC , FISH OIL , VITAMIN E, HERBALS  (May take Tylenol)      ONLY if you are prescribed any types of blood thinners such as:  Aspirin, Coumadin, Plavix, Pradaxa, Xarelto, Aggrenox, Effient, Eliquis, Savasya, Brilinta, or any other, ask your surgeon whether you should stop taking them and how long before surgery you should stop.  You may also need to verify with the prescribing physician if it is ok to stop your medication.                                                        IMPORTANT INSTRUCTIONS        Shower the night before AND the morning of your procedure with a Chlorhexidine wash such as Hibiclens or Dial antibacterial soap from the neck down.   No lotions, powder or oils on your skin after you shower. DO NOT WEAR DEODORANT   Do not get it on your face or in your eyes.  You may use your own shampoo and face wash. This helps your skin to be as bacteria free as possible.    DO NOT  remove hair from the surgery site.  Do not shave the incision site unless you are given specific instructions to do so.    Sleep in a bed with clean sheets.    Do not sleep with a pet in the bed.   If you wear contact lenses, dentures, hearing aids or glasses, bring a container to put them in during surgery and give to a family member for safe keeping.    Please leave all jewelry, piercing's and valuables at home.   Wear rubber soled shoes (no flip flops).        Make arrangements in advance for transportation home by a responsible adult.      You must make arrangements for transportation, TAXI'S, UBER'S OR LYFTS ARE NOT ALLOWED.        If you have any questions about these instructions, call (Monday - Friday) Pre-Op Admit  Nursing  at [phone] or the Pre-Op Day Surgery Unit at [phone].

## 2024-08-16 ENCOUNTER — HOSPITAL ENCOUNTER (OUTPATIENT)
Facility: HOSPITAL | Age: 54
Discharge: HOME | End: 2024-08-16
Payer: COMMERCIAL

## 2024-08-16 ENCOUNTER — ANESTHESIA (OUTPATIENT)
Dept: SURGERY | Facility: HOSPITAL | Age: 54
End: 2024-08-16
Payer: COMMERCIAL

## 2024-08-16 ENCOUNTER — ANESTHESIA EVENT (OUTPATIENT)
Dept: SURGERY | Facility: HOSPITAL | Age: 54
End: 2024-08-16
Payer: COMMERCIAL

## 2024-08-16 VITALS
OXYGEN SATURATION: 94 % | TEMPERATURE: 97 F | SYSTOLIC BLOOD PRESSURE: 133 MMHG | DIASTOLIC BLOOD PRESSURE: 66 MMHG | RESPIRATION RATE: 14 BRPM | HEART RATE: 85 BPM

## 2024-08-16 DIAGNOSIS — M20.10: ICD-10-CM

## 2024-08-16 DIAGNOSIS — M20.11 HALLUX VALGUS OF RIGHT FOOT: ICD-10-CM

## 2024-08-16 DIAGNOSIS — R52 PAIN: ICD-10-CM

## 2024-08-16 DIAGNOSIS — Q66.222 METATARSUS ADDUCTUS OF BOTH FEET: Primary | ICD-10-CM

## 2024-08-16 DIAGNOSIS — M20.11 HALLUX INTERPHALANGEUS, ACQUIRED, RIGHT: ICD-10-CM

## 2024-08-16 DIAGNOSIS — Q66.221 METATARSUS ADDUCTUS OF BOTH FEET: Primary | ICD-10-CM

## 2024-08-16 PROCEDURE — 71000039 HC RECOVERY, EACH ADD'L HOUR

## 2024-08-16 PROCEDURE — 71000033 HC RECOVERY, INTIAL HOUR

## 2024-08-16 PROCEDURE — C1713 ANCHOR/SCREW BN/BN,TIS/BN: HCPCS

## 2024-08-16 PROCEDURE — 27201423 OPTIME MED/SURG SUP & DEVICES STERILE SUPPLY

## 2024-08-16 PROCEDURE — 25000003 PHARM REV CODE 250: Performed by: ANESTHESIOLOGY

## 2024-08-16 PROCEDURE — 63600175 PHARM REV CODE 636 W HCPCS: Performed by: ANESTHESIOLOGY

## 2024-08-16 PROCEDURE — 37000008 HC ANESTHESIA 1ST 15 MINUTES

## 2024-08-16 PROCEDURE — 63600175 PHARM REV CODE 636 W HCPCS

## 2024-08-16 PROCEDURE — 36000708 HC OR TIME LEV III 1ST 15 MIN

## 2024-08-16 PROCEDURE — 25000003 PHARM REV CODE 250: Performed by: NURSE ANESTHETIST, CERTIFIED REGISTERED

## 2024-08-16 PROCEDURE — 36000709 HC OR TIME LEV III EA ADD 15 MIN

## 2024-08-16 PROCEDURE — C1769 GUIDE WIRE: HCPCS

## 2024-08-16 PROCEDURE — 28298 COR HLX VLGS PRX PHLX OSTEOT: CPT | Mod: 51,T5,S$GLB,

## 2024-08-16 PROCEDURE — C9290 INJ, BUPIVACAINE LIPOSOME: HCPCS

## 2024-08-16 PROCEDURE — 37000009 HC ANESTHESIA EA ADD 15 MINS

## 2024-08-16 PROCEDURE — 63600175 PHARM REV CODE 636 W HCPCS: Performed by: NURSE ANESTHETIST, CERTIFIED REGISTERED

## 2024-08-16 PROCEDURE — 28730 FUSION OF FOOT BONES: CPT | Mod: RT,S$GLB,,

## 2024-08-16 PROCEDURE — 63600175 PHARM REV CODE 636 W HCPCS: Mod: JZ,JG

## 2024-08-16 PROCEDURE — 94799 UNLISTED PULMONARY SVC/PX: CPT

## 2024-08-16 PROCEDURE — C1734 ORTH/DEVIC/DRUG BN/BN,TIS/BN: HCPCS

## 2024-08-16 DEVICE — IMPLANTABLE DEVICE: Type: IMPLANTABLE DEVICE | Site: FOOT | Status: FUNCTIONAL

## 2024-08-16 DEVICE — SCREW FASTPTCH LOK 2.7X12/14MM: Type: IMPLANTABLE DEVICE | Site: FOOT | Status: FUNCTIONAL

## 2024-08-16 DEVICE — SYS LAPIPLASTY S4 MULTPLNR 3.9: Type: IMPLANTABLE DEVICE | Site: FOOT | Status: FUNCTIONAL

## 2024-08-16 DEVICE — SCREW FASTPITCH LOK 2.7X16MM: Type: IMPLANTABLE DEVICE | Site: FOOT | Status: FUNCTIONAL

## 2024-08-16 DEVICE — KIT AUGMENT BONE GRAFT 3.0CC: Type: IMPLANTABLE DEVICE | Site: FOOT | Status: FUNCTIONAL

## 2024-08-16 RX ORDER — BUPIVACAINE HYDROCHLORIDE 5 MG/ML
INJECTION, SOLUTION EPIDURAL; INTRACAUDAL
Status: DISCONTINUED | OUTPATIENT
Start: 2024-08-16 | End: 2024-08-16 | Stop reason: HOSPADM

## 2024-08-16 RX ORDER — ACETAMINOPHEN 10 MG/ML
INJECTION, SOLUTION INTRAVENOUS
Status: DISCONTINUED | OUTPATIENT
Start: 2024-08-16 | End: 2024-08-16

## 2024-08-16 RX ORDER — DIPHENHYDRAMINE HYDROCHLORIDE 50 MG/ML
12.5 INJECTION INTRAMUSCULAR; INTRAVENOUS
Status: DISCONTINUED | OUTPATIENT
Start: 2024-08-16 | End: 2024-08-17 | Stop reason: HOSPADM

## 2024-08-16 RX ORDER — OXYCODONE AND ACETAMINOPHEN 10; 325 MG/1; MG/1
1 TABLET ORAL EVERY 6 HOURS PRN
Qty: 28 TABLET | Refills: 0 | Status: SHIPPED | OUTPATIENT
Start: 2024-08-16 | End: 2024-09-15

## 2024-08-16 RX ORDER — LIDOCAINE HYDROCHLORIDE 20 MG/ML
INJECTION INTRAVENOUS
Status: DISCONTINUED | OUTPATIENT
Start: 2024-08-16 | End: 2024-08-16

## 2024-08-16 RX ORDER — DEXAMETHASONE SODIUM PHOSPHATE 4 MG/ML
INJECTION, SOLUTION INTRA-ARTICULAR; INTRALESIONAL; INTRAMUSCULAR; INTRAVENOUS; SOFT TISSUE
Status: DISCONTINUED | OUTPATIENT
Start: 2024-08-16 | End: 2024-08-16

## 2024-08-16 RX ORDER — GLUCAGON 1 MG
1 KIT INJECTION
Status: DISCONTINUED | OUTPATIENT
Start: 2024-08-16 | End: 2024-08-17 | Stop reason: HOSPADM

## 2024-08-16 RX ORDER — FAMOTIDINE 10 MG/ML
INJECTION INTRAVENOUS
Status: DISCONTINUED | OUTPATIENT
Start: 2024-08-16 | End: 2024-08-16

## 2024-08-16 RX ORDER — MIDAZOLAM HYDROCHLORIDE 1 MG/ML
INJECTION INTRAMUSCULAR; INTRAVENOUS
Status: DISCONTINUED | OUTPATIENT
Start: 2024-08-16 | End: 2024-08-16

## 2024-08-16 RX ORDER — HYDROCODONE BITARTRATE AND ACETAMINOPHEN 5; 325 MG/1; MG/1
1 TABLET ORAL EVERY 4 HOURS PRN
Status: CANCELLED | OUTPATIENT
Start: 2024-08-16

## 2024-08-16 RX ORDER — MEPERIDINE HYDROCHLORIDE 50 MG/ML
12.5 INJECTION INTRAMUSCULAR; INTRAVENOUS; SUBCUTANEOUS EVERY 10 MIN PRN
Status: DISCONTINUED | OUTPATIENT
Start: 2024-08-16 | End: 2024-08-16 | Stop reason: HOSPADM

## 2024-08-16 RX ORDER — OXYCODONE HYDROCHLORIDE 5 MG/1
5 TABLET ORAL ONCE
Status: COMPLETED | OUTPATIENT
Start: 2024-08-16 | End: 2024-08-16

## 2024-08-16 RX ORDER — FENTANYL CITRATE 50 UG/ML
INJECTION, SOLUTION INTRAMUSCULAR; INTRAVENOUS
Status: DISCONTINUED | OUTPATIENT
Start: 2024-08-16 | End: 2024-08-16

## 2024-08-16 RX ORDER — OXYCODONE HYDROCHLORIDE 5 MG/1
5 TABLET ORAL
Status: DISCONTINUED | OUTPATIENT
Start: 2024-08-16 | End: 2024-08-17 | Stop reason: HOSPADM

## 2024-08-16 RX ORDER — ONDANSETRON HYDROCHLORIDE 2 MG/ML
4 INJECTION, SOLUTION INTRAVENOUS DAILY PRN
Status: DISCONTINUED | OUTPATIENT
Start: 2024-08-16 | End: 2024-08-17 | Stop reason: HOSPADM

## 2024-08-16 RX ORDER — HYDROMORPHONE HYDROCHLORIDE 1 MG/ML
0.2 INJECTION, SOLUTION INTRAMUSCULAR; INTRAVENOUS; SUBCUTANEOUS EVERY 5 MIN PRN
Status: COMPLETED | OUTPATIENT
Start: 2024-08-16 | End: 2024-08-16

## 2024-08-16 RX ORDER — CEFAZOLIN SODIUM 1 G/3ML
INJECTION, POWDER, FOR SOLUTION INTRAMUSCULAR; INTRAVENOUS
Status: DISCONTINUED | OUTPATIENT
Start: 2024-08-16 | End: 2024-08-16

## 2024-08-16 RX ORDER — PROPOFOL 10 MG/ML
INJECTION, EMULSION INTRAVENOUS
Status: DISCONTINUED | OUTPATIENT
Start: 2024-08-16 | End: 2024-08-16

## 2024-08-16 RX ORDER — ROCURONIUM BROMIDE 10 MG/ML
INJECTION, SOLUTION INTRAVENOUS
Status: DISCONTINUED | OUTPATIENT
Start: 2024-08-16 | End: 2024-08-16

## 2024-08-16 RX ORDER — ONDANSETRON HYDROCHLORIDE 2 MG/ML
INJECTION, SOLUTION INTRAVENOUS
Status: DISCONTINUED | OUTPATIENT
Start: 2024-08-16 | End: 2024-08-16

## 2024-08-16 RX ADMIN — ROCURONIUM BROMIDE 50 MG: 10 INJECTION, SOLUTION INTRAVENOUS at 01:08

## 2024-08-16 RX ADMIN — PROPOFOL 150 MG: 10 INJECTION, EMULSION INTRAVENOUS at 01:08

## 2024-08-16 RX ADMIN — SUGAMMADEX 200 MG: 100 INJECTION, SOLUTION INTRAVENOUS at 07:08

## 2024-08-16 RX ADMIN — PROPOFOL 50 MG: 10 INJECTION, EMULSION INTRAVENOUS at 05:08

## 2024-08-16 RX ADMIN — LIDOCAINE HYDROCHLORIDE 100 MG: 20 INJECTION, SOLUTION INTRAVENOUS at 01:08

## 2024-08-16 RX ADMIN — ACETAMINOPHEN 1000 MG: 10 INJECTION, SOLUTION INTRAVENOUS at 01:08

## 2024-08-16 RX ADMIN — CEFAZOLIN 2 G: 330 INJECTION, POWDER, FOR SOLUTION INTRAMUSCULAR; INTRAVENOUS at 05:08

## 2024-08-16 RX ADMIN — ONDANSETRON 4 MG: 2 INJECTION INTRAMUSCULAR; INTRAVENOUS at 01:08

## 2024-08-16 RX ADMIN — HYDROMORPHONE HYDROCHLORIDE 0.2 MG: 1 INJECTION, SOLUTION INTRAMUSCULAR; INTRAVENOUS; SUBCUTANEOUS at 07:08

## 2024-08-16 RX ADMIN — OXYCODONE HYDROCHLORIDE 5 MG: 5 TABLET ORAL at 08:08

## 2024-08-16 RX ADMIN — HYDROMORPHONE HYDROCHLORIDE 0.2 MG: 1 INJECTION, SOLUTION INTRAMUSCULAR; INTRAVENOUS; SUBCUTANEOUS at 08:08

## 2024-08-16 RX ADMIN — ROCURONIUM BROMIDE 25 MG: 10 INJECTION, SOLUTION INTRAVENOUS at 05:08

## 2024-08-16 RX ADMIN — SODIUM CHLORIDE, SODIUM LACTATE, POTASSIUM CHLORIDE, AND CALCIUM CHLORIDE: .6; .31; .03; .02 INJECTION, SOLUTION INTRAVENOUS at 03:08

## 2024-08-16 RX ADMIN — CEFAZOLIN 2 G: 330 INJECTION, POWDER, FOR SOLUTION INTRAMUSCULAR; INTRAVENOUS at 01:08

## 2024-08-16 RX ADMIN — FENTANYL CITRATE 100 MCG: 50 INJECTION, SOLUTION INTRAMUSCULAR; INTRAVENOUS at 01:08

## 2024-08-16 RX ADMIN — MIDAZOLAM HYDROCHLORIDE 2 MG: 1 INJECTION, SOLUTION INTRAMUSCULAR; INTRAVENOUS at 01:08

## 2024-08-16 RX ADMIN — FAMOTIDINE 20 MG: 10 INJECTION, SOLUTION INTRAVENOUS at 01:08

## 2024-08-16 RX ADMIN — SODIUM CHLORIDE, SODIUM LACTATE, POTASSIUM CHLORIDE, AND CALCIUM CHLORIDE: .6; .31; .03; .02 INJECTION, SOLUTION INTRAVENOUS at 01:08

## 2024-08-16 RX ADMIN — DEXAMETHASONE SODIUM PHOSPHATE 8 MG: 4 INJECTION, SOLUTION INTRAMUSCULAR; INTRAVENOUS at 01:08

## 2024-08-16 NOTE — DISCHARGE INSTRUCTIONS
No driving.  Non weight bearing status right foot  Ice and elevate foot on pillows when resting.  Leave dressing and walking boot in place until MD followup.  Notify MD for fever > 100.5, drainage on surgical dressing, severe pain or persistent nausea/vomiting.

## 2024-08-16 NOTE — ANESTHESIA PREPROCEDURE EVALUATION
08/16/2024  Kevin Ca Jr. is a 53 y.o., male.      Patient Active Problem List   Diagnosis    Acute lateral meniscal tear, right, subsequent encounter    Non-smoker       Past Surgical History:   Procedure Laterality Date    ARTHROSCOPY OF KNEE Right 7/16/2019    Procedure: ARTHROSCOPY, KNEE;  Surgeon: Ganesh Berry MD;  Location: [facility] OR;  Service: Orthopedics;  Laterality: Right;    HAND TENDON SURGERY Left 1992    HERNIA REPAIR Right 04/12/2017    with mesh    REPAIR OF MENISCUS OF KNEE Right 7/16/2019    Procedure: REPAIR, MENISCUS, KNEE;  Surgeon: Ganesh Berry MD;  Location: [facility] OR;  Service: Orthopedics;  Laterality: Right;        Tobacco Use:  The patient  reports that he has never smoked. He has never used smokeless tobacco.     Results for orders placed or performed during the hospital encounter of 08/15/24   EKG 12-lead    Collection Time: 08/15/24  3:45 PM   Result Value Ref Range    QRS Duration 102 ms    [facility] QTC Calculation 408 ms    Narrative    Test Reason : Z01.818,    Vent. Rate : 066 BPM     Atrial Rate : 066 BPM     P-R Int : 144 ms          QRS Dur : 102 ms      QT Int : 390 ms       P-R-T Axes : 060 087 030 degrees     QTc Int : 408 ms    Normal sinus rhythm  Normal ECG  When compared with ECG of 12-JUL-2019 09:23,  No significant change was found    Referred By:             Confirmed By:              Lab Results   Component Value Date    WBC 7.97 08/15/2024    HGB 13.5 (L) 08/15/2024    HCT 42.0 08/15/2024    MCV 90 08/15/2024     08/15/2024     BMP  Lab Results   Component Value Date     08/15/2024    K 4.6 08/15/2024     08/15/2024    CO2 27 08/15/2024    BUN 24 (H) 08/15/2024    CREATININE 1.1 08/15/2024    CALCIUM 9.5 08/15/2024    ANIONGAP 5 (L) 08/15/2024    GLU 94 08/15/2024    GLU 95 05/01/2024    GLU 93 07/12/2019       No results found  for this or any previous visit.              Pre-op Assessment    I have reviewed the Patient Summary Reports.     I have reviewed the Nursing Notes. I have reviewed the NPO Status.   I have reviewed the Medications.     Review of Systems  Anesthesia Hx:  No problems with previous Anesthesia             Denies Family Hx of Anesthesia complications.    Denies Personal Hx of Anesthesia complications.                    Social:  Non-Smoker       Hematology/Oncology:  Hematology Normal                                     Cardiovascular:  Cardiovascular Normal                                            Pulmonary:  Pulmonary Normal                       Hepatic/GI:     GERD, well controlled             Musculoskeletal:  Musculoskeletal Normal                Neurological:  Neurology Normal                                      Endocrine:  Endocrine Normal    A1C slightly elevated      Obesity / BMI > 30      Physical Exam  General: Well nourished, Cooperative, Alert and Oriented    Airway:  Mallampati: III / II  Mouth Opening: Normal  TM Distance: Normal  Tongue: Normal  Neck ROM: Normal ROM    Dental:  Intact    Chest/Lungs:  Clear to auscultation    Heart:  Rate: Normal  Rhythm: Regular Rhythm  Sounds: Normal    Abdomen:  Normal, Soft, Nontender        Anesthesia Plan  Type of Anesthesia, risks & benefits discussed:    Anesthesia Type: Gen ETT  Intra-op Monitoring Plan: Standard ASA Monitors  Post Op Pain Control Plan: multimodal analgesia and IV/PO Opioids PRN  Induction:  IV  Airway Plan: Video, Post-Induction  Informed Consent: Informed consent signed with the Patient and all parties understand the risks and agree with anesthesia plan.  All questions answered.   ASA Score: 2  Anesthesia Plan Notes:     GETA  IV tylenol  Zofran Decadron Pepcid  Local per surgeon    Ready For Surgery From Anesthesia Perspective.     .

## 2024-08-17 NOTE — OP NOTE
Counts include 234 beds at the [facility]  Operative Note      Date of Procedure: 8/16/2024     Procedure: Procedure(s) (LRB):  Bunionectomy, Lapidus bunionectomy with 1st TMTJ and resection of medial eminence (Right)   Angular correction if metatarsus adductus with fusions following closing bases taking from the base of the 2nd and 3rd TMTJs (Right)   1st Proximal phalanx interphalangeus correction with akin osteotomy. (Right)     Surgeons and Role:     * Bebeto Gonzalez DPM - Primary    Pre-Operative Diagnosis: Hallux valgus of right foot [M20.11]    Post-Operative Diagnosis: Post-Op Diagnosis Codes:     * Hallux valgus of right foot [M20.11]    Anesthesia: General    Operative Findings (including complications, if any): No complications noted     Description of Technical Procedures:     The patient was transported to the operating room on a stretcher and was transferred to the OR table in supine position. Anesthesia was induced.  A tourniquet was applied to the right calf. The right lower limb was prepped and draped in a sterile manner. Tourniquet(s) inflated to 250 mmHg.     Ultrasound guided regional popliteal and adductor canal block was done by anesthesia pre-operatively.     Large C arm fluoroscopy was used to map out an incision over the Left 3rd TMT joint. A 6-8cm incision over the 3rd metatarsal with sharp and dull periosteal dissection, With care taken to preserve and retract neurovasclar structures and tendons. Superficial bleeders ligate with electrocautery. The EDB muscle belly was partially incised for visualization. Once the 2nd and 3rd TMTJ were exposed soft tissues were reflected, the 3rd metatarsal was freed from the 4th, the 2nd and 3rd TMTs were planed with the aductoplasty planer, and a cut guide was placed in the joint space and secured with 1/2 pins. Fluro and a sagittal saw was used to resected wedges from the base of the 2nd and 3rd metatarsal, osteotome used to removed wedges, shifting the forefoot from  an adducted position to a rectus position. Ligamentous attachments between the 2nd and 3rd metatarsals was preserved. Site was copiously irrigated, followed by fenestration of the fusion sites. Compression was placed at the joint, joint compression, and locking plates and screws were placed over the 2nd and 3rd TMTs with good alignment and position confirmed with flouro.      Attention was directed to the dorsal right first ray. A skin marker was used to plan a linear dorsal medial incision beginning proximal to the 1st metatarsal base and extending distally at the 1st PIPJ along the medial aspect of the EHL tendon. A #15 blade was used to create a controlled depth incision followed by blunt dissection through skin to subcutaneous tissue with care taken to retract and protect neurovascular structures. Superficial veins were ligated as necessary with monopolar Bovie electrocautery. The subcutaneous tissues were  from the deep fascia and a dorsal medial capsular incision was then preformed followed by subperiosteal dissection, exposing and mobilizing the 1st tarsalmetatarsal joint.     Next, attention was directed to the 1st MTPJ.  A joint sparing sequential lateral release was performed incising the fibular suspensory sesamoid ligament, and lateral 1 MTP capsule.     The 3 in 1 positioner with cut guide was temporarily applied. The Lapiplasty Lapidus 1st metatarsal cuneiform fusion system utilized. 1cm controlled depth incision preformed with #15 blade down to subcutaneous tissue, over 2nd metatarsal at the level of the 5th metatarsal head. Blunt dissection via mosquito forceps to bone carried out. Temporary fixation K wire placed at proximal 1st metatarsal shaft directed medial to lateral to 5th metatarsal head. The metatarsal was reduced with the compression clamp inserted, secured, and temporary olive wire fixation medially. Improvement in sesamoid alignment and adequate reduction verified under  fluoroscopy. Olive wires were driven into cut guide and the subchondral base of the 1st metatarsal and distal subchondral surface of the medial cuneiform were resected using sagittal saw with 138 blade. Cut guide removed from field, distractor applied to olive wires and 1st TMTJ was distracted. Osteochondral fragments were excised and the joint flushed with NS. Both surfaces were fenestrated with a 2.0 mm drill bit. A ronguer was employed to resect medial and dorsal flares of the 1st metatarsal base. Distractor, compression clamp, and temporary fixation wire were removed and taken off the field. Under fluoroscopic guidance with confirmation of adequate and improved reduction, alignment, and sesamoid position, two crossing olive wires were driven from distal to proximal through the 1st metatarsal base and medial cuneiform. Two four screw locking plates were placed and temporarily fixated with two olive wires each, one to the 1st TMTJ dorsally and the other over the 1st TMTJ medially. Adequate reduction, alignment, and sesamoid position was verified once more before 2.0 mm under-drill preformed. Eight locking screws were employed in securing the plates. Four screws were hand tightened, two to each plate, at the distal and proximal aspects. After the remaining olive wires were removed and taken off the field, and the repeat for preformed for the other screw holes of each plate. Appropriate hardware placement and reduction of the 1st TMTJ fusion fluoroscopically confirmed.      The incision was extended to the PIPJ made at the dorsal medial aspect of the 1st MTPJ through the skin down to SQ followed by blunt dissection down to the deep fascia and capsule. A linear capsular incision was made followed by subperiosteal dissection to the expose the medial eminence which was resected with a sagittal saw and osteotome preserving the medial sagittal groove.     Fluoroscopic imaging reviewed noting residual increase in hallux  interphalangeus angle therefore decision was made to perform an Akin osteotomy at the mid aspect of the proximal phalanx.  A dorsal medial incision was made medial to the EHL tendon and centered over the proximal phalanx in a controlled depth manner through the skin down to subcutaneous tissue.  Combination of sharp and blunt dissection was taken through superficial fascia down to deep fascia.  A linear periosteal incision was then created followed by subperiosteal dissection.  The EHL tendon was then retracted laterally.  A microsagittal saw was then utilized to perform a closing medial based wedge osteotomy at the mid aspect of the proximal phalanx with intact lateral hinge that was gradually feathered closing down the osteotomy.  Permanent fixation was then completed with a Nitinol staple with good alignment confirmed fluoroscopically.  Clinically the toe appeared to be straight.     Augment PRP bone graft was place in all arthrodesis sites.     At this point it was determined with the adjacent correction a hammertoe procedure entailing soft tissue balancing was not indicated, discussed with patient prior to procedure.     Closure was preformed with 3-0 and 4-0 vicryl and 3-0 nylon.     The surgical site was irrigated with copious saline solution via bulb syringe.  The surgical site was dressed with xeroform, 4x4 gauze, cast padding, and ACE wrap. Tourniquet(s) deflated.  Boot application preformed. .     Significant Surgical Tasks Conducted by the Assistant(s), if Applicable: None    Estimated Blood Loss (EBL): 50 mL            Implants:   Implant Name Type Inv. Item Serial No.  Lot No. LRB No. Used Action   SYS LAPIPLASTY S4 MULTPLNR 3.9 - [id number]  SYS LAPIPLASTY S4 MULTPLNR 3.9  [id number] Right 1 Implanted   PACK LESSER TMT FIXATION - [id number]  PACK LESSER TMT FIXATION  [employer] [id number] Right 1 Implanted   PACK LESSER TMT FIXATION - [id number]  PACK LESSER  TMT FIXATION  [facility] CSID [id number] Right 1 Implanted   SCREW FASTPTCH WILLIAMS 2.7X12/14MM - [id number]  SCREW FASTPTCH WILLIAMS 2.7X12/14MM  [facility] CSID [id number] Right 1 Implanted   SCREW FASTPITCH WILLIAMS 2.7X16MM - [id number]  SCREW FASTPITCH WILLIAMS 2.7X16MM  [facility] CSID [id number] Right 1 Implanted   KIT AUGMENT BONE GRAFT 3.0CC - [id number]  KIT AUGMENT BONE GRAFT 3.0CC  [employer] [id number] Right 1 Implanted   Z-STAPLE    Z MEDICA [id number] Right 1 Implanted       Specimens:   Specimen (24h ago, onward)      None                    Condition: Good    Disposition: PACU - hemodynamically stable.    Attestation: I was present and scrubbed for the entire procedure.    Discharge Note    OUTCOME: Patient tolerated treatment/procedure well without complication and is now ready for discharge.    DISPOSITION: Home or Self Care    FINAL DIAGNOSIS:  R foot HAV, Interphalangeus. And metarsal adductus    FOLLOWUP: In clinic    DISCHARGE INSTRUCTIONS: After imaging      Document    BUNION REMOVAL DISCHARGE INSTRUCTIONS (ENGLISH)

## 2024-08-17 NOTE — TRANSFER OF CARE
Anesthesia Transfer of Care Note    Patient: Kevin Ca Jr.    Procedure(s) Performed: Procedure(s) (LRB):  BUNIONECTOMY (Right)  FUSION, JOINT, MIDFOOT (Right)    Patient location: PACU    Anesthesia Type: general    Transport from OR: Transported from OR on room air with adequate spontaneous ventilation    Post pain: adequate analgesia    Post assessment: no apparent anesthetic complications and tolerated procedure well    Post vital signs: stable    Level of consciousness: awake    Nausea/Vomiting: no nausea/vomiting    Complications: none    Transfer of care protocol was followed      Last vitals: Visit Vitals  BP (!) 143/86   Pulse 75   Temp 36.6 °C (97.9 °F) (Oral)   Resp 16   SpO2 98%

## 2024-08-17 NOTE — BRIEF OP NOTE
[facility]  Brief Operative Note    Surgery Date: 8/16/2024     Surgeons and Role:     * Bebeto Gonzalez DPM - Primary    Assisting Surgeon: None    Pre-op Diagnosis:  Hallux valgus of right foot [M20.11]    Post-op Diagnosis:  Post-Op Diagnosis Codes:     * Hallux valgus of right foot [M20.11]    Procedure(s) (LRB):  BUNIONECTOMY (Right)  FUSION, JOINT, MIDFOOT (Right)    Anesthesia: General    Operative Findings: Right foot- Metadductus correction with Osteotomy and 2nd/3rd TMTJ fusion. Lapiplasty lapidus bunionectomy with resection of the 1st met medial eminence and Ismael osteotomy.     Estimated Blood Loss: 50 mL         Specimens:   Specimen (24h ago, onward)      None              Discharge Note    OUTCOME: Patient tolerated treatment/procedure well without complication and is now ready for discharge.    DISPOSITION: Home or Self Care    FINAL DIAGNOSIS:  <principal problem not specified>    FOLLOWUP: In clinic     Clinical Reference Documents Added to Patient Instructions         Document    BUNION REMOVAL DISCHARGE INSTRUCTIONS (ENGLISH)           No

## 2024-08-17 NOTE — ANESTHESIA POSTPROCEDURE EVALUATION
Anesthesia Post Evaluation    Patient: Kevin Ca Jr.    Procedure(s) Performed: Procedure(s) (LRB):  BUNIONECTOMY (Right)  FUSION, JOINT, MIDFOOT (Right)    Final Anesthesia Type: general      Patient location during evaluation: PACU  Patient participation: Yes- Able to Participate  Level of consciousness: awake and alert and oriented  Post-procedure vital signs: reviewed and stable  Pain management: adequate  Airway patency: patent    PONV status at discharge: No PONV  Anesthetic complications: no      Cardiovascular status: blood pressure returned to baseline and hemodynamically stable  Respiratory status: unassisted, spontaneous ventilation and room air  Hydration status: euvolemic  Follow-up not needed.              Vitals Value Taken Time   /66 08/16/24 2105   Temp 36.3 °C (97.3 °F) 08/16/24 2045   Pulse 83 08/16/24 2123   Resp 12 08/16/24 2123   SpO2 94 % 08/16/24 2123   Vitals shown include unfiled device data.      No case tracking events are documented in the log.      Pain/Beti Score: Pain Rating Prior to Med Admin: 4 (8/16/2024  9:00 PM)  Beti Score: 10 (8/16/2024  9:00 PM)

## 2024-08-21 ENCOUNTER — OFFICE VISIT (OUTPATIENT)
Dept: PODIATRY | Facility: CLINIC | Age: 54
End: 2024-08-21
Payer: COMMERCIAL

## 2024-08-21 VITALS — HEIGHT: 69 IN | BODY MASS INDEX: 34.06 KG/M2 | WEIGHT: 229.94 LBS

## 2024-08-21 DIAGNOSIS — Q66.221 METATARSUS ADDUCTUS OF BOTH FEET: ICD-10-CM

## 2024-08-21 DIAGNOSIS — M20.11 HALLUX VALGUS OF RIGHT FOOT: ICD-10-CM

## 2024-08-21 DIAGNOSIS — Q66.222 METATARSUS ADDUCTUS OF BOTH FEET: ICD-10-CM

## 2024-08-21 DIAGNOSIS — Z98.890 POSTOPERATIVE STATE: Primary | ICD-10-CM

## 2024-08-21 PROCEDURE — 99999 PR PBB SHADOW E&M-EST. PATIENT-LVL III: CPT | Mod: PBBFAC,,,

## 2024-08-21 NOTE — PROGRESS NOTES
"    [address]  [place] [address]  Phone: [phone]   [phone]    Patient's PCP:Elliott Robert MD  Referring Provider:No ref. provider found    Subjective:      Chief Complaint: Post-op Evaluation (FUSION, JOINT, MIDFOOT,BUNIONECTOMY 8/16/2024)    Date of Surgery: 8/16/2024  Procedure: Adductoplasty (Midfoot fusion 2nd and 3rd TMTJ), Lapiplasty (lapidus bunionectomy with akin osteotomy)     HPI:   Kevin Ca Jr. is a 53 y.o. male who returns to the clinic today for his post-operative visit. Kevin Ca Jr. rates pain a 2/10 on a pain scale. Compliance of Care:  Pt presents today with walking boot, bandage and knee scooter.    Vitals:    08/21/24 1509   Weight: 104.3 kg (229 lb 15 oz)   Height: 5' 9" (1.753 m)   PainSc:   2        Past Surgical History:   Procedure Laterality Date    ARTHROSCOPY OF KNEE Right 7/16/2019    Procedure: ARTHROSCOPY, KNEE;  Surgeon: Ganesh Berry MD;  Location: [facility] OR;  Service: Orthopedics;  Laterality: Right;    BUNIONECTOMY Right 8/16/2024    Procedure: BUNIONECTOMY;  Surgeon: Bebeto Gonzalez DPM;  Location: [facility] OR;  Service: Podiatry;  Laterality: Right;  Cel[place] notified j 8/15    HAND TENDON SURGERY Left 1992    HERNIA REPAIR Right 04/12/2017    with mesh    MIDFOOT ARTHRODESIS Right 8/16/2024    Procedure: FUSION, JOINT, MIDFOOT;  Surgeon: Bebeto Gonzalez DPM;  Location: [facility] OR;  Service: Podiatry;  Laterality: Right;    REPAIR OF MENISCUS OF KNEE Right 7/16/2019    Procedure: REPAIR, MENISCUS, KNEE;  Surgeon: Ganesh Berry MD;  Location: [facility] OR;  Service: Orthopedics;  Laterality: Right;     Past Medical History:   Diagnosis Date    Arthritis     Back pain     GERD (gastroesophageal reflux disease)      Family History   Problem Relation Name Age of Onset    Cancer Maternal Grandfather        Social History:   Marital Status:   Alcohol History:  reports no history of alcohol use.  Tobacco History:  reports that he " has never smoked. He has never used smokeless tobacco.  Drug History:  reports no history of drug use.    Review of patient's allergies indicates:  No Known Allergies    Current Outpatient Medications   Medication Sig Dispense Refill    ergocalciferol (ERGOCALCIFEROL) 50,000 unit Cap Take 1 capsule (50,000 Units total) by mouth every 7 days. 4 capsule 2    multivitamin (ONE DAILY MULTIVITAMIN) per tablet Take 1 tablet by mouth once daily.      omeprazole (PRILOSEC) 40 MG capsule Take 1 capsule (40 mg total) by mouth once daily. 90 capsule 3    oxyCODONE-acetaminophen (PERCOCET)  mg per tablet Take 1 tablet by mouth every 6 (six) hours as needed for Pain. 28 tablet 0     Current Facility-Administered Medications   Medication Dose Route Frequency Provider Last Rate Last Admin    sodium chloride 0.9% flush 10 mL  10 mL Intravenous PRN Bebeto Gonzalez, HARRISON         Review of Systems   Constitutional:  Negative for chills, fatigue, fever and unexpected weight change.   HENT:  Negative for hearing loss and trouble swallowing.    Eyes:  Negative for photophobia and visual disturbance.   Respiratory:  Negative for cough, shortness of breath and wheezing.    Cardiovascular:  Negative for chest pain, palpitations and leg swelling.   Gastrointestinal:  Negative for abdominal pain and nausea.   Genitourinary:  Negative for dysuria and frequency.   Musculoskeletal:  Negative for arthralgias, back pain, gait problem, joint swelling, myalgias and neck pain.   Skin:  Negative for rash and wound.   Neurological:  Negative for tremors, seizures, weakness, numbness and headaches.   Hematological:  Does not bruise/bleed easily.   Psychiatric/Behavioral:  Negative for hallucinations.          Objective:        Post-op surgery of the right foot with normal healing, no signs of infection or dehiscence of wound. Hardware in place. Normal post op exam today. No redness, no drainage, no increase in local temperature, no significant  swelling, sutures are clean dry and intact.  There is moderate amount of edema and a mild amount of bruising which is expected.         Assessment:       1. Postoperative state    2. Hallux valgus of right foot    3. Metatarsus adductus of both feet      Plan:   Postoperative state    Hallux valgus of right foot    Metatarsus adductus of both feet      The surgical dressing was removed showing no signs of infection, excess edema or malalignment. A new dry dressing was applied and patient was instructed to leave dressing intact until next visit or until instructed to remove.     Continue nonweightbearing right lower extremity in Cam walker fracture boot with knee scooter.      Ice, rest, elevate above level of heart as needed    Pain medication as instructed    This note was created using Dragon voice recognition software that occasionally misinterpreted phrases or words.    Follow up in about 2 weeks (around 9/4/2024).    Bebeto Gonzalez DPM  Podiatry / Foot and Ankle Surgery   Secure chat preferred

## 2024-08-21 NOTE — LETTER
August 21, 2024      [facility] - Podiatry  [address]  [place] [address]  Phone: [phone]  Fax: [phone]       Patient: Kevin Ca   YOB: 1970  Date of Visit: 08/21/2024    To Whom It May Concern:    Maddie Ca  was at [facility] on 08/21/2024. The patient may not return to work until at least 10/16/2024. If you have any questions or concerns, or if I can be of further assistance, please do not hesitate to contact me.    Sincerely,  Dr. Bebeto Quijano LPN

## 2024-08-25 PROBLEM — Q66.221 METATARSUS ADDUCTUS OF BOTH FEET: Status: ACTIVE | Noted: 2024-08-25

## 2024-08-25 PROBLEM — Q66.222 METATARSUS ADDUCTUS OF BOTH FEET: Status: ACTIVE | Noted: 2024-08-25

## 2024-08-25 PROBLEM — M20.11: Status: ACTIVE | Noted: 2024-08-25

## 2024-08-25 PROBLEM — M20.11 HALLUX VALGUS OF RIGHT FOOT: Status: ACTIVE | Noted: 2024-08-25

## 2024-09-04 ENCOUNTER — OFFICE VISIT (OUTPATIENT)
Dept: PODIATRY | Facility: CLINIC | Age: 54
End: 2024-09-04
Payer: COMMERCIAL

## 2024-09-04 ENCOUNTER — HOSPITAL ENCOUNTER (OUTPATIENT)
Dept: RADIOLOGY | Facility: CLINIC | Age: 54
Discharge: HOME | End: 2024-09-04
Payer: COMMERCIAL

## 2024-09-04 ENCOUNTER — OCCUPATIONAL HEALTH (OUTPATIENT)
Dept: URGENT CARE | Facility: CLINIC | Age: 54
End: 2024-09-04
Payer: COMMERCIAL

## 2024-09-04 VITALS — HEART RATE: 84 BPM | RESPIRATION RATE: 16 BRPM | HEIGHT: 69 IN | WEIGHT: 229.94 LBS | BODY MASS INDEX: 34.06 KG/M2

## 2024-09-04 DIAGNOSIS — M79.672 BILATERAL FOOT PAIN: ICD-10-CM

## 2024-09-04 DIAGNOSIS — M79.671 BILATERAL FOOT PAIN: ICD-10-CM

## 2024-09-04 DIAGNOSIS — Z98.890 POSTOPERATIVE STATE: ICD-10-CM

## 2024-09-04 DIAGNOSIS — M20.11 HALLUX VALGUS OF RIGHT FOOT: Primary | ICD-10-CM

## 2024-09-04 PROCEDURE — 99999 PR PBB SHADOW E&M-EST. PATIENT-LVL IV: CPT | Mod: PBBFAC,,,

## 2024-09-04 PROCEDURE — 73630 X-RAY EXAM OF FOOT: CPT | Mod: RT,S$GLB,, | Performed by: RADIOLOGY

## 2024-09-04 PROCEDURE — 1160F RVW MEDS BY RX/DR IN RCRD: CPT | Mod: CPTII,S$GLB,,

## 2024-09-04 PROCEDURE — 1159F MED LIST DOCD IN RCRD: CPT | Mod: CPTII,S$GLB,,

## 2024-09-04 PROCEDURE — 99024 POSTOP FOLLOW-UP VISIT: CPT | Mod: S$GLB,,,

## 2024-09-04 PROCEDURE — 3044F HG A1C LEVEL LT 7.0%: CPT | Mod: CPTII,S$GLB,,

## 2024-09-04 NOTE — LETTER
September 4, 2024      [facility] - Podiatry  [address]  [place] [address]  Phone: [phone]  Fax: [phone]       Patient: Kevin Ca  YOB: 1970  Date of Visit: 09/04/2024    To Whom It May Concern:    Kevin Ca was at [facility] Pod[facility]ry clinic on 09/04/2024. Follow up for additional imaging 09/18/24 Post op visit #3. He may return to work tentatively 10/18/24, pending healing progression. If you have any questions or concerns, or if I can be of further assistance, please do not hesitate to contact my office.    Sincerely,    Bebeto Gonzalez DPM

## 2024-09-04 NOTE — PROGRESS NOTES
"    [address] [place] [address]  Phone: [phone]   [phone]    Patient's PCP:Elliott Robert MD  Referring Provider:No ref. provider found    Subjective:      Chief Complaint: Post-op Evaluation (Midfoot fusion 2nd and 3rd TMTJ Lapiplasty lapidus bunionectomy with akin osteotomy)      Date of Surgery: 8/16/2024  Procedure: Adductoplasty (Midfoot fusion 2nd and 3rd TMTJ), Lapiplasty (lapidus bunionectomy with akin osteotomy)     HPI:   Kevin Ca Jr. is a 53 y.o. male who returns to the clinic today for his post-operative visit. Kevin Ca Jr. rates pain a 2/10 on a pain scale today but is generally between a 4-5. Compliance of Care: presents today with walking boot, bandage and knee scooter.    Vitals:    09/04/24 1031   Pulse: 84   Resp: 16   Weight: 104.3 kg (229 lb 15 oz)   Height: 5' 9" (1.753 m)   PainSc:   2        Past Surgical History:   Procedure Laterality Date    ARTHROSCOPY OF KNEE Right 7/16/2019    Procedure: ARTHROSCOPY, KNEE;  Surgeon: Ganesh Berry MD;  Location: [facility] OR;  Service: Orthopedics;  Laterality: Right;    BUNIONECTOMY Right 8/16/2024    Procedure: BUNIONECTOMY;  Surgeon: Bebeto Gonzalez DPM;  Location: [facility] OR;  Service: Podiatry;  Laterality: Right;  [place] notified j 8/15    HAND TENDON SURGERY Left 1992    HERNIA REPAIR Right 04/12/2017    with mesh    MIDFOOT ARTHRODESIS Right 8/16/2024    Procedure: FUSION, JOINT, MIDFOOT;  Surgeon: Bebeto Gonzalez DPM;  Location: [facility] OR;  Service: Podiatry;  Laterality: Right;    REPAIR OF MENISCUS OF KNEE Right 7/16/2019    Procedure: REPAIR, MENISCUS, KNEE;  Surgeon: Ganesh Berry MD;  Location: [facility] OR;  Service: Orthopedics;  Laterality: Right;     Past Medical History:   Diagnosis Date    Arthritis     Back pain     GERD (gastroesophageal reflux disease)      Family History   Problem Relation Name Age of Onset    Cancer Maternal Grandfather          Social History:   Marital " Status:   Alcohol History:  reports no history of alcohol use.  Tobacco History:  reports that he has never smoked. He has never used smokeless tobacco.  Drug History:  reports no history of drug use.    Review of patient's allergies indicates:  No Known Allergies    Current Outpatient Medications   Medication Sig Dispense Refill    ergocalciferol (ERGOCALCIFEROL) 50,000 unit Cap Take 1 capsule (50,000 Units total) by mouth every 7 days. 4 capsule 2    multivitamin (ONE DAILY MULTIVITAMIN) per tablet Take 1 tablet by mouth once daily.      omeprazole (PRILOSEC) 40 MG capsule Take 1 capsule (40 mg total) by mouth once daily. 90 capsule 3    oxyCODONE-acetaminophen (PERCOCET)  mg per tablet Take 1 tablet by mouth every 6 (six) hours as needed for Pain. 28 tablet 0     Current Facility-Administered Medications   Medication Dose Route Frequency Provider Last Rate Last Admin    sodium chloride 0.9% flush 10 mL  10 mL Intravenous PRN Bebeto Gonzalez, HARRISON           Review of Systems   Constitutional:  Negative for activity change, chills and fever.   Cardiovascular:  Negative for chest pain, palpitations and leg swelling.   Gastrointestinal:  Negative for constipation, diarrhea, nausea and vomiting.   Musculoskeletal:  Positive for myalgias (Right foot pain). Negative for arthralgias, back pain, gait problem and joint swelling.   Skin:  Negative for color change, pallor, rash and wound.   Neurological:  Negative for dizziness, tremors, weakness and numbness.         Objective:        Post-op surgery of the right midfoot with normal healing, no signs of infection or dehiscence of wound. Hardware in place. Normal post op exam today. No redness, no drainage, no increase in local temperature, no significant swelling, sutures.steri-strips are intact.     Imaging:   Right foot Xray 09/04/24: Bony trabeculation and early areas of consolidation at the 1st, 2nd, and 3rd TMTJs         Assessment:       1. Hallux valgus  of right foot    2. Bilateral foot pain    3. Postoperative state      Plan:   Hallux valgus of right foot  -     X-Ray Foot Complete Right    Bilateral foot pain    Postoperative state      The surgical dressing was removed showing no signs of infection, excess edema or malalignment. A new dry dressing was applied and patient was instructed to leave dressing intact until next visit or until instructed to remove.      Right foot xray obtained with early signs of bony healing/consolidation      Continue to NWB RLE with knee scooter, CAM boot, Rest, Ice, Elevate about the level of heart     Continue gabapentin and pain medication as needed, patient has both medications, has been tapering down      Sutures removed today      Patient can begin gently range of motion exercises while NWB, spelling out the alphabet with distal foot     At next follow up based on radiographic progression, may be okay to start WBAT in boot, tentatively planning to transition out of boot to normal shoe gear around the end of the 7th/beginning of 8th week.      Will consider physical therapy in the distant future      This note was created using Dragon voice recognition software that occasionally misinterpreted phrases or words.     Follow up in about 2 weeks (around 9/18/2024).    Bebeto Gonzalez DPM  Podiatry / Foot and Ankle Surgery   Secure chat preferred

## 2024-09-10 ENCOUNTER — TELEPHONE (OUTPATIENT)
Dept: PODIATRY | Facility: CLINIC | Age: 54
End: 2024-09-10
Payer: COMMERCIAL

## 2024-09-10 NOTE — TELEPHONE ENCOUNTER
Spoke with patient and informed him that his FMLA paper work has been faxed and original copy is available for pickup

## 2024-09-18 ENCOUNTER — HOSPITAL ENCOUNTER (OUTPATIENT)
Dept: RADIOLOGY | Facility: CLINIC | Age: 54
Discharge: HOME | End: 2024-09-18
Payer: COMMERCIAL

## 2024-09-18 ENCOUNTER — OFFICE VISIT (OUTPATIENT)
Dept: PODIATRY | Facility: CLINIC | Age: 54
End: 2024-09-18
Payer: COMMERCIAL

## 2024-09-18 VITALS — BODY MASS INDEX: 34.06 KG/M2 | HEIGHT: 69 IN | WEIGHT: 229.94 LBS

## 2024-09-18 DIAGNOSIS — Q66.222 METATARSUS ADDUCTUS OF BOTH FEET: ICD-10-CM

## 2024-09-18 DIAGNOSIS — Q66.221 METATARSUS ADDUCTUS OF BOTH FEET: ICD-10-CM

## 2024-09-18 DIAGNOSIS — M20.11 HALLUX VALGUS OF RIGHT FOOT: ICD-10-CM

## 2024-09-18 DIAGNOSIS — Z98.890 POSTOPERATIVE STATE: Primary | ICD-10-CM

## 2024-09-18 PROCEDURE — 3044F HG A1C LEVEL LT 7.0%: CPT | Mod: CPTII,S$GLB,,

## 2024-09-18 PROCEDURE — 73630 X-RAY EXAM OF FOOT: CPT | Mod: RT,S$GLB,, | Performed by: RADIOLOGY

## 2024-09-18 PROCEDURE — 1160F RVW MEDS BY RX/DR IN RCRD: CPT | Mod: CPTII,S$GLB,,

## 2024-09-18 PROCEDURE — 1159F MED LIST DOCD IN RCRD: CPT | Mod: CPTII,S$GLB,,

## 2024-09-18 PROCEDURE — 99999 PR PBB SHADOW E&M-EST. PATIENT-LVL III: CPT | Mod: PBBFAC,,,

## 2024-09-18 PROCEDURE — 99024 POSTOP FOLLOW-UP VISIT: CPT | Mod: S$GLB,,,

## 2024-09-18 NOTE — LETTER
September 18, 2024      [facility] - Podiatry  [address]  [address]  [place] [facility] [address]  Phone: [phone]  Fax: [phone]       Patient: Kevin Ca   YOB: 1970  Date of Visit: 09/18/2024    To Whom It May Concern:    Maddie Ca  was at [facility] on 09/18/2024. The patient may return to work on Oct. 23rd tentatively with no restrictions. Patient will be visiting us again on Oct.2nd. If you have any questions or concerns, or if I can be of further assistance, please do not hesitate to contact me.    Sincerely,      Dr.Rustin Carlos Weston, BHUPINDERN

## 2024-09-18 NOTE — PROGRESS NOTES
"    [address] [facility] [address] [place] [address]  Phone: [phone]   [phone]    Patient's PCP:Elliott Robert MD  Referring Provider:No ref. provider found    Subjective:      Chief Complaint: Post-op evaluation     Date of Surgery: 8/16/2024  Procedure: Midfoot fusion 2nd and 3rd TMTJ Lapiplasty lapidus bunionectomy with akin osteotomy RT foot    HPI:   Kevin Ca Jr. is a 53 y.o. male who returns to the clinic today for his post-operative visit. Kevin Ca Jr. rates pain a 0/10 on a pain scale. Compliance of Care:  PT states around 6PM the pain in the foot is increased. PT presents in walking boot, bandage and knee scooter.      Vitals:    09/18/24 1009   Weight: 104.3 kg (229 lb 15 oz)   Height: 5' 9" (1.753 m)   PainSc: 0-No pain        Past Surgical History:   Procedure Laterality Date    ARTHROSCOPY OF KNEE Right 7/16/2019    Procedure: ARTHROSCOPY, KNEE;  Surgeon: Ganesh Berry MD;  Location: [facility] OR;  Service: Orthopedics;  Laterality: Right;    BUNIONECTOMY Right 8/16/2024    Procedure: BUNIONECTOMY;  Surgeon: Bebeto Gonzalez DPM;  Location: [facility] OR;  Service: Podiatry;  Laterality: Right;  [place] notified j 8/15    HAND TENDON SURGERY Left 1992    HERNIA REPAIR Right 04/12/2017    with mesh    MIDFOOT ARTHRODESIS Right 8/16/2024    Procedure: FUSION, JOINT, MIDFOOT;  Surgeon: Bebeto Gonzalez DPM;  Location: [facility] OR;  Service: Podiatry;  Laterality: Right;    REPAIR OF MENISCUS OF KNEE Right 7/16/2019    Procedure: REPAIR, MENISCUS, KNEE;  Surgeon: Ganesh Berry MD;  Location: [facility] OR;  Service: Orthopedics;  Laterality: Right;     Past Medical History:   Diagnosis Date    Arthritis     Back pain     GERD (gastroesophageal reflux disease)      Family History   Problem Relation Name Age of Onset    Cancer Maternal Grandfather          Social History:   Marital Status:   Alcohol History:  reports no history of alcohol use.  Tobacco History:  " reports that he has never smoked. He has never used smokeless tobacco.  Drug History:  reports no history of drug use.    Review of patient's allergies indicates:  No Known Allergies    Current Outpatient Medications   Medication Sig Dispense Refill    ergocalciferol (ERGOCALCIFEROL) 50,000 unit Cap Take 1 capsule (50,000 Units total) by mouth every 7 days. 4 capsule 2    multivitamin (ONE DAILY MULTIVITAMIN) per tablet Take 1 tablet by mouth once daily.      omeprazole (PRILOSEC) 40 MG capsule Take 1 capsule (40 mg total) by mouth once daily. 90 capsule 3    pregabalin (LYRICA) 50 MG capsule Take 2 capsules (100 mg total) by mouth 2 (two) times daily. 30 capsule 2     Current Facility-Administered Medications   Medication Dose Route Frequency Provider Last Rate Last Admin    sodium chloride 0.9% flush 10 mL  10 mL Intravenous PRN Bebeto Gonzalez, HARRISON           Review of Systems   Constitutional:  Negative for activity change, chills and fever.   Cardiovascular:  Negative for leg swelling.   Gastrointestinal:  Negative for constipation, diarrhea, nausea and vomiting.   Musculoskeletal:  Negative for arthralgias, back pain, gait problem, joint swelling and myalgias.   Skin:  Positive for wound (surigcal incision sites). Negative for color change, pallor and rash.   Neurological:  Negative for dizziness, tremors, weakness and numbness.         Objective:        Post-op surgery of the right midfoot with normal healing, no signs of infection or dehiscence of wound. Hardware in place. Normal post op exam today. No redness, no drainage, no increase in local temperature, no significant swelling, steri-strips are intact.          Assessment:       1. Hallux valgus of right foot    2. Right foot pain    3. Post-operative state      Plan:   Hallux valgus of right foot  -     X-Ray Foot Complete Right  -     AIR CAST WALKER BOOT FOR HOME USE    Right foot pain  -     AIR CAST WALKER BOOT FOR HOME USE    Post-operative  state  -     AIR CAST WALKER BOOT FOR HOME USE    Other orders  -     pregabalin (LYRICA) 50 MG capsule; Take 2 capsules (100 mg total) by mouth 2 (two) times daily.  Dispense: 30 capsule; Refill: 2        The surgical dressing was removed showing no signs of infection, excess edema or malalignment. A new dry dressing was applied and patient was instructed to leave dressing intact until next visit or until instructed to remove.     Patient notes difficulties in Tall CAM boot provided in the OR, short CAM boot dispensed.     Patient notes no pain at today's visit but relays some intermittent shooting electrical sensations at night that prevent adequate rest and wake him up. We dicussed gabapentin, which patient had tried in the past with no success.     Discussed Lyrica as an alternative to help manage potential post operative nerve, likely due to traction from retraction during the procedure. Reviewed potential ADRs sleepiness, muscle pain, weakness, or tenderness. Rx sent to patient's pharmacy.     Continue NWB in CAM boot, ambulatory assist via knee scooter.     Gentle ROM ankle joint exercises, and manual manipulation of MTPJs as discussed.     This note was created using Dragon voice recognition software that occasionally misinterpreted phrases or words.    Follow up in two weeks     Bebeto Gonzalez DPM  Podiatry / Foot and Ankle Surgery   Secure chat preferred

## 2024-09-19 ENCOUNTER — TELEPHONE (OUTPATIENT)
Dept: PODIATRY | Facility: CLINIC | Age: 54
End: 2024-09-19
Payer: COMMERCIAL

## 2024-09-19 RX ORDER — PREGABALIN 50 MG/1
100 CAPSULE ORAL 2 TIMES DAILY
Qty: 30 CAPSULE | Refills: 2 | Status: SHIPPED | OUTPATIENT
Start: 2024-09-19 | End: 2025-03-20

## 2024-09-19 NOTE — TELEPHONE ENCOUNTER
----- Message from Jamia Mcdaniel sent at 9/19/2024  9:20 AM CDT -----  Regarding: Pt call  Pt saw Dr Gonzalez yesterday, but his pharmacy did not receive the rx he was told would be sent. Please call patient back at [phone] when the rx is sent.    Thank you,  Jamia

## 2024-10-02 ENCOUNTER — OFFICE VISIT (OUTPATIENT)
Dept: PODIATRY | Facility: CLINIC | Age: 54
End: 2024-10-02
Payer: COMMERCIAL

## 2024-10-02 ENCOUNTER — OCCUPATIONAL HEALTH (OUTPATIENT)
Dept: URGENT CARE | Facility: CLINIC | Age: 54
End: 2024-10-02

## 2024-10-02 VITALS — BODY MASS INDEX: 34.06 KG/M2 | WEIGHT: 229.94 LBS | HEIGHT: 69 IN

## 2024-10-02 DIAGNOSIS — Z98.890 POST-OPERATIVE STATE: Primary | ICD-10-CM

## 2024-10-02 DIAGNOSIS — M20.11 HALLUX VALGUS OF RIGHT FOOT: ICD-10-CM

## 2024-10-02 DIAGNOSIS — M79.671 RIGHT FOOT PAIN: ICD-10-CM

## 2024-10-02 PROCEDURE — 99999 PR PBB SHADOW E&M-EST. PATIENT-LVL III: CPT | Mod: PBBFAC,,,

## 2024-10-02 PROCEDURE — 99024 POSTOP FOLLOW-UP VISIT: CPT | Mod: S$GLB,,,

## 2024-10-02 PROCEDURE — 1159F MED LIST DOCD IN RCRD: CPT | Mod: CPTII,S$GLB,,

## 2024-10-02 PROCEDURE — 3044F HG A1C LEVEL LT 7.0%: CPT | Mod: CPTII,S$GLB,,

## 2024-10-02 PROCEDURE — 1160F RVW MEDS BY RX/DR IN RCRD: CPT | Mod: CPTII,S$GLB,,

## 2024-10-02 NOTE — PROGRESS NOTES
"    [address] [facility] [address]  [place] [address]  Phone: [phone]   [phone]    Patient's PCP:Elliott Robert MD  Referring Provider:No ref. provider found    Subjective:      Chief Complaint: Post-op Evaluation ((Midfoot fusion 2nd and 3rd TMTJ Lapiplasty lapidus bunionectomy with akin osteotomy RT foot))  Date of Surgery: 8/16/2024  Procedure: Midfoot fusion 2nd and 3rd TMTJ Lapiplasty lapidus bunionectomy with akin osteotomy RT foot     HPI:   Kevin Ca Jr. is a 53 y.o. male who returns to the clinic today for his post-operative visit. Kevin Ca Jr. rates pain a 0/10 on a pain scale. Compliance of Care:  PT states he has some rare occasional shocking type pain and throbbing pain in his toes, seems to be improving. Pt presents in boot with compression stocking and use of knee scooter. Weight bearing in boot on heel.     Vitals:    10/02/24 0948   Weight: 104.3 kg (229 lb 15 oz)   Height: 5' 9" (1.753 m)   PainSc: 0-No pain        Past Surgical History:   Procedure Laterality Date    ARTHROSCOPY OF KNEE Right 7/16/2019    Procedure: ARTHROSCOPY, KNEE;  Surgeon: Ganesh Berry MD;  Location: [facility] OR;  Service: Orthopedics;  Laterality: Right;    BUNIONECTOMY Right 8/16/2024    Procedure: BUNIONECTOMY;  Surgeon: Bebeto Gonzalez DPM;  Location: [facility] OR;  Service: Podiatry;  Laterality: Right;  [place] notified j 8/15    HAND TENDON SURGERY Left 1992    HERNIA REPAIR Right 04/12/2017    with mesh    MIDFOOT ARTHRODESIS Right 8/16/2024    Procedure: FUSION, JOINT, MIDFOOT;  Surgeon: Bebeto Gonzalez DPM;  Location: [facility] OR;  Service: Podiatry;  Laterality: Right;    REPAIR OF MENISCUS OF KNEE Right 7/16/2019    Procedure: REPAIR, MENISCUS, KNEE;  Surgeon: Ganesh Berry MD;  Location: [facility] OR;  Service: Orthopedics;  Laterality: Right;     Past Medical History:   Diagnosis Date    Arthritis     Back pain     GERD (gastroesophageal reflux disease)      Family History   Problem " Relation Name Age of Onset    Cancer Maternal Grandfather          Social History:   Marital Status:   Alcohol History:  reports no history of alcohol use.  Tobacco History:  reports that he has never smoked. He has never used smokeless tobacco.  Drug History:  reports no history of drug use.    Review of patient's allergies indicates:  No Known Allergies    Current Outpatient Medications   Medication Sig Dispense Refill    ergocalciferol (ERGOCALCIFEROL) 50,000 unit Cap Take 1 capsule (50,000 Units total) by mouth every 7 days. 4 capsule 2    multivitamin (ONE DAILY MULTIVITAMIN) per tablet Take 1 tablet by mouth once daily.      omeprazole (PRILOSEC) 40 MG capsule Take 1 capsule (40 mg total) by mouth once daily. 90 capsule 3    pregabalin (LYRICA) 50 MG capsule Take 2 capsules (100 mg total) by mouth 2 (two) times daily. 30 capsule 2     Current Facility-Administered Medications   Medication Dose Route Frequency Provider Last Rate Last Admin    sodium chloride 0.9% flush 10 mL  10 mL Intravenous PRN Bebeto Gonzalez, HARRISON           Review of Systems   Constitutional:  Negative for chills, fatigue, fever and unexpected weight change.   HENT:  Negative for hearing loss and trouble swallowing.    Eyes:  Negative for photophobia and visual disturbance.   Respiratory:  Negative for cough, shortness of breath and wheezing.    Cardiovascular:  Negative for chest pain, palpitations and leg swelling.   Gastrointestinal:  Negative for abdominal pain and nausea.   Genitourinary:  Negative for dysuria and frequency.   Musculoskeletal:  Negative for arthralgias, back pain, gait problem, joint swelling, myalgias and neck pain.   Skin:  Negative for rash and wound.   Neurological:  Negative for tremors, seizures, weakness, numbness and headaches.   Hematological:  Does not bruise/bleed easily.       Objective:        Post-op surgery of the right foot with normal healing, incision sites fully epithelized. Hardware in place.  Normal post op exam today. No redness, no drainage, no increase in local temperature, no significant swelling.          Assessment:       1. Post-operative state    2. Hallux valgus of right foot    3. Right foot pain      Plan:   Post-operative state    Hallux valgus of right foot    Right foot pain        Continue Lyrica as needed for pain control     Continue WBAT in CAM boot, ambulatory assist via knee scooter.      Continue ROM ankle joint exercises, and manual manipulation of MTPJs as discussed.     Okay to start get foot/incision sites wet       Follow up in two weeks      This note was created using Dragon voice recognition software that occasionally misinterpreted phrases or words.    Bebeto Gonzalez DPM  Podiatry / Foot and Ankle Surgery   Secure chat preferred

## 2024-10-02 NOTE — LETTER
October 2, 2024      [facility] - Podiatry  [address]  [address]  [facility] [address]  Phone: [phone]  Fax: [phone]       Patient: Kevin Ca   YOB: 1970  Date of Visit: 10/02/2024    To Whom It May Concern:    Maddie Ca  was at [facility] on 10/02/2024. The patient may return to work  on 10/30/24 with no restrictions. If you have any questions or concerns, or if I can be of further assistance, please do not hesitate to contact me.    Sincerely,      Dr.Ajdari Davion Weston, BHUPINDERN

## 2024-10-16 ENCOUNTER — OFFICE VISIT (OUTPATIENT)
Dept: PODIATRY | Facility: CLINIC | Age: 54
End: 2024-10-16
Payer: COMMERCIAL

## 2024-10-16 VITALS — BODY MASS INDEX: 34.41 KG/M2 | WEIGHT: 233 LBS

## 2024-10-16 DIAGNOSIS — Z98.890 POST-OPERATIVE STATE: Primary | ICD-10-CM

## 2024-10-16 DIAGNOSIS — M20.11 HALLUX VALGUS OF RIGHT FOOT: ICD-10-CM

## 2024-10-16 DIAGNOSIS — M79.671 RIGHT FOOT PAIN: ICD-10-CM

## 2024-10-16 DIAGNOSIS — Q66.222 METATARSUS ADDUCTUS OF BOTH FEET: ICD-10-CM

## 2024-10-16 DIAGNOSIS — Q66.221 METATARSUS ADDUCTUS OF BOTH FEET: ICD-10-CM

## 2024-10-16 PROCEDURE — 99999 PR PBB SHADOW E&M-EST. PATIENT-LVL III: CPT | Mod: PBBFAC,,,

## 2024-10-16 NOTE — LETTER
October 16, 2024      [facility] - Podiatry  [address] [facility] [address]  [address]  [facility] [address]  Phone: [phone]  Fax: [phone]       Patient: Kevin Ca   YOB: 1970  Date of Visit: 10/16/2024    To Whom It May Concern:    Maddie Ca  was at [facility] on 10/16/2024. The patient may return to work on 12/2/2024 with no restrictions. If you have any questions or concerns, or if I can be of further assistance, please do not hesitate to contact me.    Sincerely,  Bebeto Quijano LPN

## 2024-10-16 NOTE — PROGRESS NOTES
[address] [facility] [address]  [place] [address]  Phone: [phone]   [phone]    Patient's PCP:Elliott Robert MD  Referring Provider:No ref. provider found    Subjective:      Chief Complaint: Post-op Evaluation (Post Op 8/16/24 Lapidus Bunionectomy right foot )        Date of Surgery: 8/16/2024  Procedure: BUNIONECTOMY , RT foot    HPI:   Kevin Ca Jr. is a 53 y.o. male who returns to the clinic today for his post-operative visit. Kevin Ca Jr. rates pain a 0/10 on a pain scale. Compliance of Care:  PT presents today in with a walking boot and knee scooter. PT states no new complaints.    Vitals:    10/16/24 1001   Weight: 105.7 kg (233 lb)   PainSc: 0-No pain        Past Surgical History:   Procedure Laterality Date    ARTHROSCOPY OF KNEE Right 7/16/2019    Procedure: ARTHROSCOPY, KNEE;  Surgeon: Ganesh Berry MD;  Location: [facility] OR;  Service: Orthopedics;  Laterality: Right;    BUNIONECTOMY Right 8/16/2024    Procedure: BUNIONECTOMY;  Surgeon: Bebeto Gonzalez DPM;  Location: [facility] OR;  Service: Podiatry;  Laterality: Right;  [place] notified  8/15    HAND TENDON SURGERY Left 1992    HERNIA REPAIR Right 04/12/2017    with mesh    MIDFOOT ARTHRODESIS Right 8/16/2024    Procedure: FUSION, JOINT, MIDFOOT;  Surgeon: Bebeto Gonzalez DPM;  Location: [facility] OR;  Service: Podiatry;  Laterality: Right;    REPAIR OF MENISCUS OF KNEE Right 7/16/2019    Procedure: REPAIR, MENISCUS, KNEE;  Surgeon: Ganesh Berry MD;  Location: [facility] OR;  Service: Orthopedics;  Laterality: Right;     Past Medical History:   Diagnosis Date    Arthritis     Back pain     GERD (gastroesophageal reflux disease)      Family History   Problem Relation Name Age of Onset    Cancer Maternal Grandfather          Social History:   Marital Status:   Alcohol History:  reports no history of alcohol use.  Tobacco History:  reports that he has never smoked. He has never used smokeless tobacco.  Drug History:   reports no history of drug use.    Review of patient's allergies indicates:  No Known Allergies    Current Outpatient Medications   Medication Sig Dispense Refill    ergocalciferol (ERGOCALCIFEROL) 50,000 unit Cap Take 1 capsule (50,000 Units total) by mouth every 7 days. 4 capsule 2    multivitamin (ONE DAILY MULTIVITAMIN) per tablet Take 1 tablet by mouth once daily.      omeprazole (PRILOSEC) 40 MG capsule Take 1 capsule (40 mg total) by mouth once daily. 90 capsule 3    pregabalin (LYRICA) 50 MG capsule Take 2 capsules (100 mg total) by mouth 2 (two) times daily. 30 capsule 2     Current Facility-Administered Medications   Medication Dose Route Frequency Provider Last Rate Last Admin    sodium chloride 0.9% flush 10 mL  10 mL Intravenous PRN Bebeto Gonzalez DPM           Review of Systems   Constitutional:  Negative for activity change, chills and fever.   Cardiovascular:  Negative for leg swelling.   Gastrointestinal:  Negative for constipation, diarrhea, nausea and vomiting.   Musculoskeletal:  Negative for arthralgias, back pain, gait problem, joint swelling and myalgias.   Skin:  Negative for color change, pallor, rash and wound (Healed, surigcal incision sites).   Neurological:  Negative for dizziness, tremors, weakness and numbness.         Objective:        Post-op surgery of the right foot with normal healing, no signs of infection or dehiscence of wound. Hardware in place. Normal post op exam today. No redness, no drainage, no increase in local temperature, no significant swelling.     Imaging:   None obtained today, bony consolidation of fusion sites noted last visit          Assessment:       1. Post-operative state    2. Hallux valgus of right foot    3. Right foot pain    4. Metatarsus adductus of both feet      Plan:   Post-operative state    Hallux valgus of right foot    Right foot pain    Metatarsus adductus of both feet    The surgical dressing was removed showing no signs of infection, excess  edema or malalignment.    Patient can begin transition out of boot and into standard shoe gear as tolerated and light WB without knee scooter and ROM exercises.     No dressings needed      Patient feels she would benefit from physical therapy, feels deconditioned from boot and scooter, some balance issues      8 weeks s/p midfoot fusion 1st TMT, 2nd TMT, 3rd TMTJs fusions, enough bony consolidation to begin Physical therapy, no high impact activity.      Physical therapy eval and treat: post surgical eval and treat for proprioceptive, strengthen, gait, and range of motion training R foot, ankle, MTPJs as tolerate    Continue to massage vitamin E oil and [place] into incision scars twice daily and continue manual manipulations exercises as discussed.      This note was created using Dragon voice recognition software that occasionally misinterpreted phrases or words.     Follow up in about 4 weeks (around 11/6/2024).     Bebeto Gonzalez DPM  Podiatry / Foot and Ankle Surgery   Secure chat preferred

## 2024-10-30 ENCOUNTER — OCCUPATIONAL HEALTH (OUTPATIENT)
Dept: URGENT CARE | Facility: CLINIC | Age: 54
End: 2024-10-30

## 2024-10-30 PROCEDURE — 99499 UNLISTED E&M SERVICE: CPT | Mod: S$GLB,,,

## 2024-11-07 ENCOUNTER — TELEPHONE (OUTPATIENT)
Dept: PODIATRY | Facility: CLINIC | Age: 54
End: 2024-11-07
Payer: COMMERCIAL

## 2024-11-07 DIAGNOSIS — K21.9 GASTROESOPHAGEAL REFLUX DISEASE, UNSPECIFIED WHETHER ESOPHAGITIS PRESENT: ICD-10-CM

## 2024-11-07 RX ORDER — OMEPRAZOLE 40 MG/1
40 CAPSULE, DELAYED RELEASE ORAL DAILY
Qty: 90 CAPSULE | Refills: 3 | Status: SHIPPED | OUTPATIENT
Start: 2024-11-07 | End: 2025-11-07

## 2024-11-07 NOTE — TELEPHONE ENCOUNTER
----- Message from Loly sent at 11/7/2024  2:35 PM CST -----  Refill for omeprazole 40 mg. [facility]. Pt #[phone]

## 2024-11-07 NOTE — TELEPHONE ENCOUNTER
----- Message from Jamia sent at 11/7/2024 10:47 AM CST -----  Regarding: Pt call  Patient is requesting a refill on his Pregabalin 50 mg.     Thank you,  Jamia

## 2024-11-08 DIAGNOSIS — Z98.890 POST-OPERATIVE STATE: Primary | ICD-10-CM

## 2024-11-08 DIAGNOSIS — Q66.221 METATARSUS ADDUCTUS OF BOTH FEET: ICD-10-CM

## 2024-11-08 DIAGNOSIS — M79.671 RIGHT FOOT PAIN: ICD-10-CM

## 2024-11-08 DIAGNOSIS — Q66.222 METATARSUS ADDUCTUS OF BOTH FEET: ICD-10-CM

## 2024-11-08 DIAGNOSIS — M20.11 HALLUX VALGUS OF RIGHT FOOT: ICD-10-CM

## 2024-11-08 NOTE — TELEPHONE ENCOUNTER
----- Message from Tech Trang sent at 11/8/2024 11:12 AM CST -----  Regarding: RX  Patient still has no Rx to  at [facility] on [facility] [address]. Please call him when you call it in  [phone]  Second attempt

## 2024-11-11 RX ORDER — PREGABALIN 50 MG/1
100 CAPSULE ORAL 2 TIMES DAILY
Qty: 30 CAPSULE | Refills: 2 | Status: SHIPPED | OUTPATIENT
Start: 2024-11-11 | End: 2025-05-12

## 2024-11-11 NOTE — TELEPHONE ENCOUNTER
Spoke with Padmini at [facility] who informed me [place] location has been closed since August of this year. Will call back Patient and see preferred location he would like to use

## 2024-11-11 NOTE — TELEPHONE ENCOUNTER
Spoke with patient in regards to refill on his Pregabalin 50 mg. Originally called in 11/7/24. Patient also called regarding order for Post op physical therapy which he has not heard anything about. Informed patient I would forward request to provider and look into the physical therapy order.

## 2024-11-13 ENCOUNTER — OFFICE VISIT (OUTPATIENT)
Dept: PODIATRY | Facility: CLINIC | Age: 54
End: 2024-11-13
Payer: COMMERCIAL

## 2024-11-13 ENCOUNTER — HOSPITAL ENCOUNTER (OUTPATIENT)
Dept: RADIOLOGY | Facility: CLINIC | Age: 54
Discharge: HOME | End: 2024-11-13
Payer: COMMERCIAL

## 2024-11-13 ENCOUNTER — OCCUPATIONAL HEALTH (OUTPATIENT)
Dept: URGENT CARE | Facility: CLINIC | Age: 54
End: 2024-11-13

## 2024-11-13 VITALS — BODY MASS INDEX: 34.87 KG/M2 | HEIGHT: 69 IN | WEIGHT: 235.44 LBS

## 2024-11-13 DIAGNOSIS — M20.11 HALLUX VALGUS OF RIGHT FOOT: ICD-10-CM

## 2024-11-13 DIAGNOSIS — Z02.89 ENCOUNTER FOR FIREFIGHTER MEDICAL EXAMINATION: Primary | ICD-10-CM

## 2024-11-13 DIAGNOSIS — M79.671 RIGHT FOOT PAIN: ICD-10-CM

## 2024-11-13 DIAGNOSIS — Z98.890 S/P BUNIONECTOMY: ICD-10-CM

## 2024-11-13 DIAGNOSIS — Q66.222 METATARSUS ADDUCTUS OF BOTH FEET: ICD-10-CM

## 2024-11-13 DIAGNOSIS — Q66.221 METATARSUS ADDUCTUS OF BOTH FEET: ICD-10-CM

## 2024-11-13 DIAGNOSIS — Z98.890 POST-OPERATIVE STATE: Primary | ICD-10-CM

## 2024-11-13 PROCEDURE — 99024 POSTOP FOLLOW-UP VISIT: CPT | Mod: S$GLB,,,

## 2024-11-13 PROCEDURE — 99999 PR PBB SHADOW E&M-EST. PATIENT-LVL III: CPT | Mod: PBBFAC,,,

## 2024-11-13 PROCEDURE — 99499 UNLISTED E&M SERVICE: CPT | Mod: S$GLB,,, | Performed by: EMERGENCY MEDICINE

## 2024-11-13 PROCEDURE — 73630 X-RAY EXAM OF FOOT: CPT | Mod: RT,S$GLB,, | Performed by: RADIOLOGY

## 2024-11-13 PROCEDURE — 3044F HG A1C LEVEL LT 7.0%: CPT | Mod: CPTII,S$GLB,,

## 2024-11-13 NOTE — LETTER
November 13, 2024      [facility] - Podiatry  [address]  [address]  [facility] [address]  Phone: [phone]  Fax: [phone]       Patient: Kevin Ca   YOB: 1970  Date of Visit: 11/13/2024    To Whom It May Concern:    Maddie Ca  was at [facility] on 11/13/2024. The patient may return to work on 1/1/25 with no restrictions. If you have any questions or concerns, or if I can be of further assistance, please do not hesitate to contact me.    Sincerely,         Davion Weston LPN

## 2024-11-13 NOTE — PROGRESS NOTES
Here for follow up.  Has a podiatry with Dr. Keita.  Began physical therapy this week.  Performing physical therapy Q weekly.  Next podiatry evaluation 12/11/2024.  Ambulatory with a mild antalgic gait.  Plus three pitting edema.  Surgical incision well approximated without erythema, or drainage.  No overt warmth.  Cap refill brisk.  Sensations present.

## 2024-11-13 NOTE — PROGRESS NOTES
"    [address]  [place] [address]  Phone: [phone]   [phone]    Patient's PCP:Elliott Robert MD  Referring Provider:No ref. provider found    Subjective:      Chief Complaint: Post-op Evaluation (Bunionectomy, Lapidus bunionectomy with 1st TMTJ and resection of medial eminence (Right) /Angular correction if metatarsus adductus with fusions following closing bases taking from the base of the 2nd and 3rd TMTJs (Right) /1st Proximal phalanx interphalangeus correction with akin osteotomy. (Right)/)    Date of Surgery: 8/16/2024  Procedure: BUNIONECTOMY , RT foot    HPI:   Kevin Ca Jr. is a 54 y.o. male who returns to the clinic today for his post-operative visit. Kevin Ca Jr. rates pain a 2/10 on a pain scale. Compliance of Care: Pt has started light physical therapy. No new complaints. Pt has transitioned out of the boot.     Vitals:    11/13/24 0941   Weight: 106.8 kg (235 lb 7.2 oz)   Height: 5' 9" (1.753 m)   PainSc:   2        Past Surgical History:   Procedure Laterality Date    ARTHROSCOPY OF KNEE Right 7/16/2019    Procedure: ARTHROSCOPY, KNEE;  Surgeon: Ganesh Berry MD;  Location: [facility] OR;  Service: Orthopedics;  Laterality: Right;    BUNIONECTOMY Right 8/16/2024    Procedure: BUNIONECTOMY;  Surgeon: Bebeto Gonzalez DPM;  Location: [facility] OR;  Service: Podiatry;  Laterality: Right;  [employer] notified j 8/15    HAND TENDON SURGERY Left 1992    HERNIA REPAIR Right 04/12/2017    with mesh    MIDFOOT ARTHRODESIS Right 8/16/2024    Procedure: FUSION, JOINT, MIDFOOT;  Surgeon: Bebeto Gonzalez DPM;  Location: [facility] OR;  Service: Podiatry;  Laterality: Right;    REPAIR OF MENISCUS OF KNEE Right 7/16/2019    Procedure: REPAIR, MENISCUS, KNEE;  Surgeon: Ganesh Berry MD;  Location: [facility] OR;  Service: Orthopedics;  Laterality: Right;     Past Medical History:   Diagnosis Date    Arthritis     Back pain     GERD (gastroesophageal reflux disease)      Family " History   Problem Relation Name Age of Onset    Cancer Maternal Grandfather          Social History:   Marital Status:   Alcohol History:  reports no history of alcohol use.  Tobacco History:  reports that he has never smoked. He has never used smokeless tobacco.  Drug History:  reports no history of drug use.    Review of patient's allergies indicates:  No Known Allergies    Current Outpatient Medications   Medication Sig Dispense Refill    multivitamin (ONE DAILY MULTIVITAMIN) per tablet Take 1 tablet by mouth once daily.      omeprazole (PRILOSEC) 40 MG capsule Take 1 capsule (40 mg total) by mouth once daily. 90 capsule 3    pregabalin (LYRICA) 50 MG capsule Take 2 capsules (100 mg total) by mouth 2 (two) times daily. 30 capsule 2     Current Facility-Administered Medications   Medication Dose Route Frequency Provider Last Rate Last Admin    sodium chloride 0.9% flush 10 mL  10 mL Intravenous PRN Bebeto Gonzalez, HARRISON           Review of Systems   Constitutional:  Negative for activity change, chills, fatigue, fever and unexpected weight change.   HENT:  Negative for hearing loss and trouble swallowing.    Eyes:  Negative for photophobia and visual disturbance.   Respiratory:  Negative for cough, shortness of breath and wheezing.    Cardiovascular:  Negative for chest pain, palpitations and leg swelling.   Gastrointestinal:  Negative for abdominal pain, constipation, diarrhea, nausea and vomiting.   Genitourinary:  Negative for dysuria and frequency.   Musculoskeletal:  Negative for arthralgias, back pain, gait problem, joint swelling, myalgias and neck pain.   Skin:  Negative for color change, pallor, rash and wound.   Neurological:  Negative for dizziness, tremors, seizures, weakness, numbness and headaches.   Hematological:  Does not bruise/bleed easily.         Objective:        Post-op surgery of the right foot with normal healing, incision sites fully epithelized. Hardware in place. Normal post op  exam today. No redness, no drainage, no increase in local temperature, no significant swelling.     Imaging:   EXAMINATION:  XR FOOT COMPLETE 3 VIEW RIGHT     CLINICAL HISTORY:  . Pain in right foot     TECHNIQUE:  AP, lateral, and oblique views of the right foot were performed.     COMPARISON:  09/18/2024     FINDINGS:  There has been arthrodesis across the 1st through 3rd tarsometatarsal joints with plate screws.  There has been osteotomy of the base of the great toe proximal phalanx with a U shaped nail.  The hardware components are intact and engaged with good alignment present.  A linear lucency through the distal shaft of the 2nd metatarsal is likely related to prior instrumentation.  Moderate forefoot soft tissue swelling is present.     Impression:     Postoperative right foot with intact hardware and good alignment.        Electronically signed by:Doroteo Pena MD  Date:                                            11/13/2024  Time:                                           10:18         Assessment:       1. Post-operative state    2. Hallux valgus of right foot    3. Right foot pain    4. Metatarsus adductus of both feet      Plan:   Post-operative state    Hallux valgus of right foot    Right foot pain  -     X-Ray Foot Complete Right    Metatarsus adductus of both feet    The surgical dressing was removed showing no signs of infection, excess edema or malalignment.    Continue standard shoe gear as tolerated     Continue Physical therapy eval and treat: post surgical eval and treat for proprioceptive, strengthen, gait, and range of motion training R foot, ankle, MTPJs as tolerate    Use pain as a guide to cut back on activity, increase activity level as tolerated     Continue to massage vitamin E oil and Maderma scar cream into incision scars twice daily and continue manual manipulations exercises as discussed.      This note was created using Dragon voice recognition software that occasionally  misinterpreted phrases or words.     Follow up in about 4 weeks      Bebeto Gonzalez DPM  Podiatry / Foot and Ankle Surgery   Secure chat preferred

## 2024-12-11 ENCOUNTER — OFFICE VISIT (OUTPATIENT)
Dept: PODIATRY | Facility: CLINIC | Age: 54
End: 2024-12-11
Payer: COMMERCIAL

## 2024-12-11 ENCOUNTER — OCCUPATIONAL HEALTH (OUTPATIENT)
Dept: URGENT CARE | Facility: CLINIC | Age: 54
End: 2024-12-11

## 2024-12-11 VITALS — WEIGHT: 241.63 LBS | BODY MASS INDEX: 35.79 KG/M2 | HEIGHT: 69 IN

## 2024-12-11 DIAGNOSIS — Q66.222 METATARSUS ADDUCTUS OF LEFT FOOT: ICD-10-CM

## 2024-12-11 DIAGNOSIS — M79.672 LEFT FOOT PAIN: ICD-10-CM

## 2024-12-11 DIAGNOSIS — Z02.1 DRUG SCREENING, PRE-EMPLOYMENT: Primary | ICD-10-CM

## 2024-12-11 DIAGNOSIS — M20.12 VALGUS DEFORMITY OF LEFT GREAT TOE: ICD-10-CM

## 2024-12-11 DIAGNOSIS — M20.12 HALLUX VALGUS OF LEFT FOOT: Primary | ICD-10-CM

## 2024-12-11 DIAGNOSIS — T84.84XA PAINFUL ORTHOPAEDIC HARDWARE: ICD-10-CM

## 2024-12-11 DIAGNOSIS — M20.42 HAMMER TOE OF LEFT FOOT: ICD-10-CM

## 2024-12-11 PROCEDURE — 99213 OFFICE O/P EST LOW 20 MIN: CPT | Mod: S$GLB,,,

## 2024-12-11 PROCEDURE — 99499 UNLISTED E&M SERVICE: CPT | Mod: S$GLB,,, | Performed by: EMERGENCY MEDICINE

## 2024-12-11 PROCEDURE — 3008F BODY MASS INDEX DOCD: CPT | Mod: CPTII,S$GLB,,

## 2024-12-11 PROCEDURE — 1160F RVW MEDS BY RX/DR IN RCRD: CPT | Mod: CPTII,S$GLB,,

## 2024-12-11 PROCEDURE — 99999 PR PBB SHADOW E&M-EST. PATIENT-LVL III: CPT | Mod: PBBFAC,,,

## 2024-12-11 PROCEDURE — 1159F MED LIST DOCD IN RCRD: CPT | Mod: CPTII,S$GLB,,

## 2024-12-11 NOTE — PROGRESS NOTES
"    [address] [facility] [address] [place] [address]  Phone: [phone]   [phone]    Patient's PCP:Elliott Robert MD  Referring Provider:No ref. provider found    Subjective:      Chief Complaint: Follow-up (Follow up on Bunionectomy, Lapidus bunionectomy with 1st TMTJ and resection of medial eminence (Right) /Angular correction if metatarsus adductus with fusions following closing bases taking from the base of the 2nd and 3rd TMTJs (Right) /1st Proximal phalanx interphalangeus correction with akin osteotomy. (Right))    Date of Surgery: 8/16/2024  Procedure: BUNIONECTOMY , RT foot    Follow-up  Pertinent negatives include no abdominal pain, arthralgias, chest pain, chills, coughing, fatigue, fever, headaches, joint swelling, myalgias, nausea, neck pain, numbness, rash, vomiting or weakness.     HPI:   Kevin Ca Jr. is a 54 y.o. male who presents today with a  follow up on Bunionectomy, Lapidus bunionectomy with 1st TMTJ and resection of medial eminence (Right) /Angular correction if metatarsus adductus with fusions following closing bases taking from the base of the 2nd and 3rd TMTJs (Right) /1st Proximal phalanx interphalangeus correction with akin osteotomy. (Right). No new complaints or concerned at this time. Pt has started physical therapy and goes once a week. Pt has dry needling and taping. Going to gym 6 days a week focusing on low impact exercises as disscussed.     Vitals:    12/11/24 0937   Weight: 109.6 kg (241 lb 10 oz)   Height: 5' 9" (1.753 m)   PainSc:   2        Past Surgical History:   Procedure Laterality Date    ARTHROSCOPY OF KNEE Right 07/16/2019    Procedure: ARTHROSCOPY, KNEE;  Surgeon: Ganseh Berry MD;  Location: [facility] OR;  Service: Orthopedics;  Laterality: Right;    BUNIONECTOMY Right 08/16/2024    Procedure: BUNIONECTOMY;  Surgeon: Bebeto Gonzalez DPM;  Location: [facility] OR;  Service: Podiatry;  Laterality: Right;  [employer] notified  8/15    CORRECTION OF HAMMER " TOE Left 1/17/2025    Procedure: CORRECTION, HAMMER TOE;  Surgeon: Bebeto Gonzalez DPM;  Location: [facility] OR;  Service: Podiatry;  Laterality: Left;  With tendon and capsule balancing    FOOT HARDWARE REMOVAL Right 1/17/2025    Procedure: REMOVAL, HARDWARE, FOOT;  Surgeon: Bebeto Gonzalez DPM;  Location: [facility] OR;  Service: Podiatry;  Laterality: Right;    FOOT OSTEOTOMY Left 1/17/2025    Procedure: OSTEOTOMY, FOOT;  Surgeon: eBbeto Gonzalez DPM;  Location: [facility] OR;  Service: Podiatry;  Laterality: Left;    HAND TENDON SURGERY Left 1992    HERNIA REPAIR Right 04/12/2017    with mesh    LAPIDUS BUNIONECTOMY Left 1/17/2025    Procedure: BUNIONECTOMY, LAPIDUS;  Surgeon: Bebeto Gonzalez DPM;  Location: [facility] OR;  Service: Podiatry;  Laterality: Left;    MIDFOOT ARTHRODESIS Right 08/16/2024    Procedure: FUSION, JOINT, MIDFOOT;  Surgeon: Bebeto Gonzalez DPM;  Location: [facility] OR;  Service: Podiatry;  Laterality: Right;    RECONSTRUCTION WITH FUSION OF CHARCOT FOOT Left 1/17/2025    Procedure: RECONSTRUCTION, CHARCOT FOOT, WITH FUSION;  Surgeon: Bebeto Gonzalez DPM;  Location: [facility] OR;  Service: Podiatry;  Laterality: Left;    REPAIR OF MENISCUS OF KNEE Right 07/16/2019    Procedure: REPAIR, MENISCUS, KNEE;  Surgeon: Ganesh Berry MD;  [facility];  Service: Orthopedics;  Laterality: Right;    right foot fracture  2005     Past Medical History:   Diagnosis Date    Arthritis     Back pain     GERD (gastroesophageal reflux disease)      Family History   Problem Relation Name Age of Onset    Cancer Maternal Grandfather          Social History:   Marital Status:   Alcohol History:  reports no history of alcohol use.  Tobacco History:  reports that he has never smoked. He has never used smokeless tobacco.  Drug History:  reports no history of drug use.    Review of patient's allergies indicates:  No Known Allergies    Current Outpatient Medications   Medication Sig Dispense Refill    multivitamin (ONE DAILY  MULTIVITAMIN) per tablet Take 1 tablet by mouth once daily.      omeprazole (PRILOSEC) 40 MG capsule Take 1 capsule (40 mg total) by mouth once daily. 90 capsule 3    naproxen sodium (ANAPROX) 220 MG tablet Take 220 mg by mouth every 12 (twelve) hours.      omega-3 fatty acids/fish oil (FISH OIL-OMEGA-3 FATTY ACIDS) 300-1,000 mg capsule Take 1 capsule by mouth once daily.      oxyCODONE-acetaminophen (PERCOCET)  mg per tablet Take 1 tablet by mouth every 6 (six) hours as needed for Pain. 27 tablet 0    pregabalin (LYRICA) 50 MG capsule Take 2 capsules (100 mg total) by mouth 2 (two) times daily. 60 capsule 6     No current facility-administered medications for this visit.       Review of Systems   Constitutional:  Negative for activity change, chills, fatigue, fever and unexpected weight change.   HENT:  Negative for hearing loss and trouble swallowing.    Eyes:  Negative for photophobia and visual disturbance.   Respiratory:  Negative for cough, shortness of breath and wheezing.    Cardiovascular:  Negative for chest pain, palpitations and leg swelling.   Gastrointestinal:  Negative for abdominal pain, constipation, diarrhea, nausea and vomiting.   Genitourinary:  Negative for dysuria and frequency.   Musculoskeletal:  Negative for arthralgias, back pain, gait problem, joint swelling, myalgias and neck pain.   Skin:  Negative for color change, pallor, rash and wound.   Neurological:  Negative for dizziness, tremors, seizures, weakness, numbness and headaches.   Hematological:  Does not bruise/bleed easily.         Objective:        Post-op surgery of the right foot with normal healing, incision sites fully epithelized. Hardware in place. Normal post op exam today. No redness, no drainage, no increase in local temperature, no significant swelling.     Imaging: None           Assessment:       1. Hallux valgus of left foot    2. Left foot pain    3. Metatarsus adductus of left foot    4. Valgus deformity of  left great toe    5. Hammer toe of left foot    6. Painful orthopaedic hardware      Plan:   Hallux valgus of left foot    Left foot pain    Metatarsus adductus of left foot    Valgus deformity of left great toe    Hammer toe of left foot    Painful orthopaedic hardware    The surgical dressing was removed showing no signs of infection, excess edema or malalignment.    Continue standard shoe gear as tolerated     Continue Physical therapy eval and treat: post surgical eval and treat for proprioceptive, strengthen, gait, and range of motion training R foot, ankle, MTPJs as tolerate    Use pain as a guide to cut back on activity, increase activity level as tolerated     Continue to massage vitamin E oil and Maderma scar cream into incision scars twice daily and continue manual manipulations exercises as discussed.      This note was created using Dragon voice recognition software that occasionally misinterpreted phrases or words.     Follow up in about 4 weeks, pending continued positive progression can discuss surgery for the painful right foot next visit     Bebeto Gonzalez DPM  Podiatry / Foot and Ankle Surgery   Secure chat preferred

## 2024-12-11 NOTE — LETTER
December 11, 2024      [facility] - Podiatry  [address]  [address]  [place] [address]  Phone: [phone]  Fax: [phone]       Patient: Kevin Ca   YOB: 1970  Date of Visit: 12/11/2024    To Whom It May Concern:    Maddie Ca  was at [facility] on 12/11/2024. The patient may return to work on 2/1/2024 with no restrictions. If you have any questions or concerns, or if I can be of further assistance, please do not hesitate to contact me.    Sincerely,            Sona Quijano LPN

## 2024-12-30 ENCOUNTER — TELEPHONE (OUTPATIENT)
Dept: PODIATRY | Facility: CLINIC | Age: 54
End: 2024-12-30
Payer: COMMERCIAL

## 2024-12-30 DIAGNOSIS — Z98.890 POST-OPERATIVE STATE: ICD-10-CM

## 2024-12-30 DIAGNOSIS — M79.671 RIGHT FOOT PAIN: ICD-10-CM

## 2024-12-30 NOTE — TELEPHONE ENCOUNTER
----- Message from Chapin Costello sent at 12/30/2024  1:42 PM CST -----  Regarding: Rx  Patient called and said that he needs refill of his RX PREGABALIN 50 MG  [facility] [address]

## 2025-01-01 RX ORDER — PREGABALIN 50 MG/1
100 CAPSULE ORAL 2 TIMES DAILY
Qty: 30 CAPSULE | Refills: 1 | Status: SHIPPED | OUTPATIENT
Start: 2025-01-01 | End: 2025-07-02

## 2025-01-03 ENCOUNTER — TELEPHONE (OUTPATIENT)
Dept: PODIATRY | Facility: CLINIC | Age: 55
End: 2025-01-03
Payer: COMMERCIAL

## 2025-01-03 DIAGNOSIS — Q66.222 METATARSUS ADDUCTUS OF LEFT FOOT: ICD-10-CM

## 2025-01-03 DIAGNOSIS — M20.42 HAMMER TOE OF LEFT FOOT: ICD-10-CM

## 2025-01-03 DIAGNOSIS — M20.12 HALLUX VALGUS OF LEFT FOOT: Primary | ICD-10-CM

## 2025-01-03 DIAGNOSIS — T84.84XA PAINFUL ORTHOPAEDIC HARDWARE: ICD-10-CM

## 2025-01-03 DIAGNOSIS — M20.12 VALGUS DEFORMITY OF LEFT GREAT TOE: ICD-10-CM

## 2025-01-03 DIAGNOSIS — M79.672 LEFT FOOT PAIN: ICD-10-CM

## 2025-01-08 ENCOUNTER — OCCUPATIONAL HEALTH (OUTPATIENT)
Dept: URGENT CARE | Facility: CLINIC | Age: 55
End: 2025-01-08

## 2025-01-08 ENCOUNTER — OFFICE VISIT (OUTPATIENT)
Dept: PODIATRY | Facility: CLINIC | Age: 55
End: 2025-01-08
Payer: COMMERCIAL

## 2025-01-08 VITALS — WEIGHT: 244.69 LBS | BODY MASS INDEX: 36.14 KG/M2

## 2025-01-08 DIAGNOSIS — Z00.00 ROUTINE GENERAL MEDICAL EXAMINATION AT A HEALTH CARE FACILITY: Primary | ICD-10-CM

## 2025-01-08 DIAGNOSIS — Q66.222 METATARSUS ADDUCTUS OF LEFT FOOT: ICD-10-CM

## 2025-01-08 DIAGNOSIS — Q66.221 METATARSUS ADDUCTUS OF BOTH FEET: ICD-10-CM

## 2025-01-08 DIAGNOSIS — M79.672 LEFT FOOT PAIN: ICD-10-CM

## 2025-01-08 DIAGNOSIS — Q66.222 METATARSUS ADDUCTUS OF BOTH FEET: ICD-10-CM

## 2025-01-08 DIAGNOSIS — M79.671 RIGHT FOOT PAIN: ICD-10-CM

## 2025-01-08 DIAGNOSIS — T84.84XA PAINFUL ORTHOPAEDIC HARDWARE: ICD-10-CM

## 2025-01-08 DIAGNOSIS — M20.42 HAMMERTOE OF LEFT FOOT: ICD-10-CM

## 2025-01-08 DIAGNOSIS — M20.12 HALLUX VALGUS OF LEFT FOOT: Primary | ICD-10-CM

## 2025-01-08 DIAGNOSIS — M20.12 VALGUS DEFORMITY OF LEFT GREAT TOE: ICD-10-CM

## 2025-01-08 DIAGNOSIS — Z98.890 S/P BUNIONECTOMY: ICD-10-CM

## 2025-01-08 PROCEDURE — 99999 PR PBB SHADOW E&M-EST. PATIENT-LVL III: CPT | Mod: PBBFAC,,,

## 2025-01-08 PROCEDURE — 3008F BODY MASS INDEX DOCD: CPT | Mod: CPTII,S$GLB,,

## 2025-01-08 PROCEDURE — 1160F RVW MEDS BY RX/DR IN RCRD: CPT | Mod: CPTII,S$GLB,,

## 2025-01-08 PROCEDURE — 99214 OFFICE O/P EST MOD 30 MIN: CPT | Mod: S$GLB,,,

## 2025-01-08 PROCEDURE — 1159F MED LIST DOCD IN RCRD: CPT | Mod: CPTII,S$GLB,,

## 2025-01-08 RX ORDER — PREGABALIN 50 MG/1
100 CAPSULE ORAL 2 TIMES DAILY
Qty: 60 CAPSULE | Refills: 6 | Status: SHIPPED | OUTPATIENT
Start: 2025-01-08 | End: 2025-07-09

## 2025-01-08 NOTE — LETTER
January 8, 2025      [facility] - Podiatry  [address]  [address]  [place] [facility] [address]  Phone: [phone]  Fax: [phone]       Patient: Kevin Ca   YOB: 1970  Date of Visit: 01/08/2025    To Whom It May Concern:    Maddie Ca  was at [facility] on 01/08/2025. The patient may return to work/school on 04/01/2025 with no restrictions. If you have any questions or concerns, or if I can be of further assistance, please do not hesitate to contact me.    Sincerely,

## 2025-01-08 NOTE — H&P (VIEW-ONLY)
[address] [facility] [address]  [place] [address]  Phone: [phone]   [phone]    Patient's PCP:Elliott Robert MD  Referring Provider: No ref. provider found    Subjective:      Chief Complaint:: Follow-up (Bunionectomy, Lapidus bunionectomy with 1st TMTJ and resection of medial eminence (Right) /Angular correction if metatarsus adductus with fusions following closing bases taking from the base of the 2nd and 3rd TMTJs, 1st Proximal phalanx interphalangeus correction with akin osteotomy, RT )    HPI  Kevin Ca Jr. is a 54 y.o. male who presents today Kevin Ca Jr. is a 54 y.o. male who presents s/p Bunionectomy, Lapidus bunionectomy with 1st TMTJ and resection of medial eminence (Right) /Angular correction if metatarsus adductus with fusions following closing bases taking from the base of the 2nd and 3rd TMTJs, 1st Proximal phalanx interphalangeus correction with akin osteotomy, RT 8/16/2024. PT denies any new complaints at this time. PT presents today in normal shoes. PT states he does have some pain in his RT 1st. Now that he is healed up and using the right foot without any issues he would like to discuss surgery for the left foot. The current episode on the left started over a 1 year ago.  The symptoms include throbbing pain. The symptoms are aggravated by shoes, standing. The symptoms have progressively worsened. Treatment to date have included shoe modifications, OTC inserts which provided no relief.     Vitals:    01/08/25 0813   Weight: 111 kg (244 lb 11.4 oz)   PainSc:   2      Shoe Size: 10    Past Surgical History:   Procedure Laterality Date    ARTHROSCOPY OF KNEE Right 07/16/2019    Procedure: ARTHROSCOPY, KNEE;  Surgeon: Ganesh Berry MD;  Location: [facility] OR;  Service: Orthopedics;  Laterality: Right;    BUNIONECTOMY Right 08/16/2024    Procedure: BUNIONECTOMY;  Surgeon: Bebeto Gonzalez DPM;  Location: [facility] OR;  Service: Podiatry;  Laterality: Right;  [place] notified j  8/15    HAND TENDON SURGERY Left 1992    HERNIA REPAIR Right 04/12/2017    with mesh    MIDFOOT ARTHRODESIS Right 08/16/2024    Procedure: FUSION, JOINT, MIDFOOT;  Surgeon: Bebeto Gonzalez DPM;  Location: [facility] OR;  Service: Podiatry;  Laterality: Right;    REPAIR OF MENISCUS OF KNEE Right 07/16/2019    Procedure: REPAIR, MENISCUS, KNEE;  Surgeon: Ganesh Berry MD;  Location: [facility] OR;  Service: Orthopedics;  Laterality: Right;    right foot fracture  2005     Past Medical History:   Diagnosis Date    Arthritis     Back pain     GERD (gastroesophageal reflux disease)      Family History   Problem Relation Name Age of Onset    Cancer Maternal Grandfather          Social History:   Marital Status:   Alcohol History:  reports no history of alcohol use.  Tobacco History:  reports that he has never smoked. He has never used smokeless tobacco.  Drug History:  reports no history of drug use.    Review of patient's allergies indicates:  No Known Allergies    No current facility-administered medications for this visit.     No current outpatient medications on file.     Facility-Administered Medications Ordered in Other Visits   Medication Dose Route Frequency Provider Last Rate Last Admin    dextrose 50% injection 12.5 g  12.5 g Intravenous PRN Hernando Uribe MD        diphenhydrAMINE injection 12.5 mg  12.5 mg Intravenous Once PRN Hernando Uribe MD        glucagon (human recombinant) injection 1 mg  1 mg Intramuscular PRN Hernando Uribe MD        HYDROmorphone injection 0.2 mg  0.2 mg Intravenous Q5 Min PRN Hernando Uribe MD        ondansetron disintegrating tablet 4 mg  4 mg Oral Once Hernando Uribe MD        ondansetron injection 4 mg  4 mg Intravenous Daily PRN Hernando Uribe MD        oxyCODONE immediate release tablet 5 mg  5 mg Oral Q3H PRN Hernando Uribe MD        oxyCODONE immediate release tablet 5 mg  5 mg Oral Q4H PRN Hernando Uribe MD        sodium chloride 0.9% flush 10 mL  10 mL  Intravenous PRN Bebeto Gonzalez DPM           Review of Systems   Constitutional:  Negative for chills, fatigue, fever and unexpected weight change.   HENT:  Negative for hearing loss and trouble swallowing.    Eyes:  Negative for photophobia and visual disturbance.   Respiratory:  Negative for cough, shortness of breath and wheezing.    Cardiovascular:  Negative for chest pain, palpitations and leg swelling.   Gastrointestinal:  Negative for abdominal pain and nausea.   Genitourinary:  Negative for dysuria and frequency.   Musculoskeletal:  Negative for arthralgias, back pain, gait problem, joint swelling, myalgias and neck pain.   Skin:  Negative for rash and wound.   Neurological:  Negative for tremors, seizures, weakness, numbness and headaches.   Hematological:  Does not bruise/bleed easily.   Psychiatric/Behavioral:  Negative for hallucinations.          Objective:        Physical Exam:   Foot Exam    General  General Appearance: appears stated age and healthy   Orientation: alert and oriented to person, place, and time   Affect: appropriate       Right Foot/Ankle     Inspection and Palpation  Ecchymosis: none  Tenderness: (1st metatarsal distal medial prominence)  Swelling: none   Arch: pes planus  Hammertoes: second toe  Hallux valgus: yes  Hallux limitus: no  Skin Exam: skin intact;   Fungus Toenails: not present  Neurovascular  Dorsalis pedis: 3+  Posterior tibial: 3+  Capillary Refill: 3+  Varicose veins: not present  Saphenous nerve sensation: normal  Tibial nerve sensation: normal  Superficial peroneal nerve sensation: normal  Deep peroneal nerve sensation: normal  Sural nerve sensation: normal    Muscle Strength  Ankle dorsiflexion: 5  Ankle plantar flexion: 5  Ankle inversion: 5  Ankle eversion: 5  Great toe extension: 5  Great toe flexion: 5    Range of Motion    Passive  Ankle dorsiflexion: 5      Tests  PT Tinel's sign: negative    Heel raise: normal control   Valleix sign:  negative  Comments  HAV  - Tracking   - 1st MTPJ ROM WNL, Smooth, without crepitation or pain   - Hypermobility of the 1st ray with foot loaded appreciated; 7 mm dorsiflexion, 7 mm Plantarflexion     Digital contracture 2nd      AJ ROM 5 degrees knee extended, 7 degrees knee flexed     Left Foot/Ankle      Inspection and Palpation  Ecchymosis: none  Tenderness: (1st metatarsal distal medial prominence)  Arch: pes planus  Hammertoes: second toe  Hallux valgus: yes  Hallux limitus: no  Skin Exam: skin intact;   Fungus Toenails: not present    Neurovascular  Dorsalis pedis: 3+  Posterior tibial: 3+  Capillary refill: 3+  Varicose veins: not present  Saphenous nerve sensation: normal  Tibial nerve sensation: normal  Superficial peroneal nerve sensation: normal  Deep peroneal nerve sensation: normal  Sural nerve sensation: normal    Muscle Strength  Ankle dorsiflexion: 5  Ankle plantar flexion: 5  Ankle inversion: 5  Ankle eversion: 5  Great toe extension: 5  Great toe flexion: 5    Range of Motion    Passive  Ankle dorsiflexion: 5      Tests  PT Tinel's sign: negative  Heel raise: normal control Valleix sign: negative  Comments  HAV  - Tracking   - 1st MTPJ ROM WNL, Smooth, without crepitation or pain   - Hypermobility of the 1st ray with foot loaded appreciated; 7 mm dorsiflexion, 7 mm Plantarflexion     Digital contracture 2nd      AJ ROM 5 degrees knee extended, 7 degrees knee flexed           [id number] Right foot xray  Radiology read: No fracture or dislocation. There is moderate hallux valgus with bunion formation bilaterally. There are bilateral hammertoes.    Independent read: Metatarsal adductus b/l, HAV b/l, Hammertoes b/l, atavistic 1st cuneiforms, mild TN un coverage b/l, minor eburnation at the bases of the 1st proximal phalanxes b/l    Metarsus adductus angle: 17 degrees   Mamadou's Angle: Aprox 36 degrees   True 1st IM angle: 11 + 17 -15 = 13 degrees          Assessment:       1. Hallux valgus of  left foot    2. Hav (hallux abducto valgus), right    3. Post-operative state    4. Right foot pain    5. Metatarsus adductus of both feet    6. S/P bunionectomy    7. Painful orthopaedic hardware    8. Valgus deformity of left great toe    9. Metatarsus adductus of left foot      Plan:   Hallux valgus of left foot    Hav (hallux abducto valgus), right    Post-operative state  -     pregabalin (LYRICA) 50 MG capsule; Take 2 capsules (100 mg total) by mouth 2 (two) times daily.  Dispense: 60 capsule; Refill: 6    Right foot pain  -     pregabalin (LYRICA) 50 MG capsule; Take 2 capsules (100 mg total) by mouth 2 (two) times daily.  Dispense: 60 capsule; Refill: 6    Metatarsus adductus of both feet    S/P bunionectomy    Painful orthopaedic hardware    Valgus deformity of left great toe    Metatarsus adductus of left foot      I provided patient education verbally regarding: Patient diagnosis, treatment options, as well as alternatives, risks, and benefits.     Surgical consult for patient with b/l painful HAV, Met adductus, and hammer toes. Conservative measures failed include shoe gear modifications, OTC orthotics, toe spacers, padding/strapping.     Reviewed foot radiographs and labs (CBC, CMP, A1C, EKG, CXR)    Long discussion with patient regarding the procedure in detail. Patient understands all risks, potential complications, and alternatives, including, but not limited to those listed on the consent form. All questions were answered. No guarantees given or implied as to outcome.     Plan for Left foot Lapidus Bunionectomy, potential akin osteotomy, arthrodesis of 2nd and 3rd TMTJ to correct met adductus, and L 2nd digit hammertoe correction/tendon capsule balancing.     Order placed for surgical intervention.     Anticipate surgery for 01/17/25.     NPO Midnight 01/16/25    Anesthesia: Local / MAC ; Exparil at procedure end for extended acute post operative pain relief    Weightbearing status for surgery post  op: Non WB in Short CAM walker boot with crutches     RTC 1 week after surgery and advised to call or come in sooner (prior to surgery date) for any further questions or concerns.     (This note was created using voice recognition software that occasionally misinterpreted phrases or words.)        Bebeto Gonzalez DPM  Podiatry / Foot and Ankle Surgery   Secure chat preferred

## 2025-01-08 NOTE — PROGRESS NOTES
[address] [facility] [address]  [place] [address]  Phone: [phone]   [phone]    Patient's PCP:Elliott Robert MD  Referring Provider: No ref. provider found    Subjective:      Chief Complaint:: Follow-up (Bunionectomy, Lapidus bunionectomy with 1st TMTJ and resection of medial eminence (Right) /Angular correction if metatarsus adductus with fusions following closing bases taking from the base of the 2nd and 3rd TMTJs, 1st Proximal phalanx interphalangeus correction with akin osteotomy, RT )    HPI  Kevin Ca Jr. is a 54 y.o. male who presents today Kevin Ca Jr. is a 54 y.o. male who presents s/p Bunionectomy, Lapidus bunionectomy with 1st TMTJ and resection of medial eminence (Right) /Angular correction if metatarsus adductus with fusions following closing bases taking from the base of the 2nd and 3rd TMTJs, 1st Proximal phalanx interphalangeus correction with akin osteotomy, RT 8/16/2024. PT denies any new complaints at this time. PT presents today in normal shoes. PT states he does have some pain in his RT 1st. Now that he is healed up and using the right foot without any issues he would like to discuss surgery for the left foot. The current episode on the left started over a 1 year ago.  The symptoms include throbbing pain. The symptoms are aggravated by shoes, standing. The symptoms have progressively worsened. Treatment to date have included shoe modifications, OTC inserts which provided no relief.     Vitals:    01/08/25 0813   Weight: 111 kg (244 lb 11.4 oz)   PainSc:   2      Shoe Size: 10    Past Surgical History:   Procedure Laterality Date    ARTHROSCOPY OF KNEE Right 07/16/2019    Procedure: ARTHROSCOPY, KNEE;  Surgeon: Ganesh Berry MD;  Location: [facility] OR;  Service: Orthopedics;  Laterality: Right;    BUNIONECTOMY Right 08/16/2024    Procedure: BUNIONECTOMY;  Surgeon: Bebeto Gonzalez DPM;  Location: [facility] OR;  Service: Podiatry;  Laterality: Right;  [place] notified j  8/15    HAND TENDON SURGERY Left 1992    HERNIA REPAIR Right 04/12/2017    with mesh    MIDFOOT ARTHRODESIS Right 08/16/2024    Procedure: FUSION, JOINT, MIDFOOT;  Surgeon: Bebeto Gonzalez DPM;  Location: [facility] OR;  Service: Podiatry;  Laterality: Right;    REPAIR OF MENISCUS OF KNEE Right 07/16/2019    Procedure: REPAIR, MENISCUS, KNEE;  Surgeon: Ganesh Berry MD;  Location: [facility] OR;  Service: Orthopedics;  Laterality: Right;    right foot fracture  2005     Past Medical History:   Diagnosis Date    Arthritis     Back pain     GERD (gastroesophageal reflux disease)      Family History   Problem Relation Name Age of Onset    Cancer Maternal Grandfather          Social History:   Marital Status:   Alcohol History:  reports no history of alcohol use.  Tobacco History:  reports that he has never smoked. He has never used smokeless tobacco.  Drug History:  reports no history of drug use.    Review of patient's allergies indicates:  No Known Allergies    No current facility-administered medications for this visit.     No current outpatient medications on file.     Facility-Administered Medications Ordered in Other Visits   Medication Dose Route Frequency Provider Last Rate Last Admin    dextrose 50% injection 12.5 g  12.5 g Intravenous PRN Hernando Uribe MD        diphenhydrAMINE injection 12.5 mg  12.5 mg Intravenous Once PRN Hernando Uribe MD        glucagon (human recombinant) injection 1 mg  1 mg Intramuscular PRN Hernando Uribe MD        HYDROmorphone injection 0.2 mg  0.2 mg Intravenous Q5 Min PRN Hernando Uribe MD        ondansetron disintegrating tablet 4 mg  4 mg Oral Once Hernando Uribe MD        ondansetron injection 4 mg  4 mg Intravenous Daily PRN Hernando Uribe MD        oxyCODONE immediate release tablet 5 mg  5 mg Oral Q3H PRN Hernando Uribe MD        oxyCODONE immediate release tablet 5 mg  5 mg Oral Q4H PRN Hernando Uribe MD        sodium chloride 0.9% flush 10 mL  10 mL  Intravenous PRN Bebeto Gonzalez DPM           Review of Systems   Constitutional:  Negative for chills, fatigue, fever and unexpected weight change.   HENT:  Negative for hearing loss and trouble swallowing.    Eyes:  Negative for photophobia and visual disturbance.   Respiratory:  Negative for cough, shortness of breath and wheezing.    Cardiovascular:  Negative for chest pain, palpitations and leg swelling.   Gastrointestinal:  Negative for abdominal pain and nausea.   Genitourinary:  Negative for dysuria and frequency.   Musculoskeletal:  Negative for arthralgias, back pain, gait problem, joint swelling, myalgias and neck pain.   Skin:  Negative for rash and wound.   Neurological:  Negative for tremors, seizures, weakness, numbness and headaches.   Hematological:  Does not bruise/bleed easily.   Psychiatric/Behavioral:  Negative for hallucinations.          Objective:        Physical Exam:   Foot Exam    General  General Appearance: appears stated age and healthy   Orientation: alert and oriented to person, place, and time   Affect: appropriate       Right Foot/Ankle     Inspection and Palpation  Ecchymosis: none  Tenderness: (1st metatarsal distal medial prominence)  Swelling: none   Arch: pes planus  Hammertoes: second toe  Hallux valgus: yes  Hallux limitus: no  Skin Exam: skin intact;   Fungus Toenails: not present  Neurovascular  Dorsalis pedis: 3+  Posterior tibial: 3+  Capillary Refill: 3+  Varicose veins: not present  Saphenous nerve sensation: normal  Tibial nerve sensation: normal  Superficial peroneal nerve sensation: normal  Deep peroneal nerve sensation: normal  Sural nerve sensation: normal    Muscle Strength  Ankle dorsiflexion: 5  Ankle plantar flexion: 5  Ankle inversion: 5  Ankle eversion: 5  Great toe extension: 5  Great toe flexion: 5    Range of Motion    Passive  Ankle dorsiflexion: 5      Tests  PT Tinel's sign: negative    Heel raise: normal control   Valleix sign:  negative  Comments  HAV  - Tracking   - 1st MTPJ ROM WNL, Smooth, without crepitation or pain   - Hypermobility of the 1st ray with foot loaded appreciated; 7 mm dorsiflexion, 7 mm Plantarflexion     Digital contracture 2nd      AJ ROM 5 degrees knee extended, 7 degrees knee flexed     Left Foot/Ankle      Inspection and Palpation  Ecchymosis: none  Tenderness: (1st metatarsal distal medial prominence)  Arch: pes planus  Hammertoes: second toe  Hallux valgus: yes  Hallux limitus: no  Skin Exam: skin intact;   Fungus Toenails: not present    Neurovascular  Dorsalis pedis: 3+  Posterior tibial: 3+  Capillary refill: 3+  Varicose veins: not present  Saphenous nerve sensation: normal  Tibial nerve sensation: normal  Superficial peroneal nerve sensation: normal  Deep peroneal nerve sensation: normal  Sural nerve sensation: normal    Muscle Strength  Ankle dorsiflexion: 5  Ankle plantar flexion: 5  Ankle inversion: 5  Ankle eversion: 5  Great toe extension: 5  Great toe flexion: 5    Range of Motion    Passive  Ankle dorsiflexion: 5      Tests  PT Tinel's sign: negative  Heel raise: normal control Valleix sign: negative  Comments  HAV  - Tracking   - 1st MTPJ ROM WNL, Smooth, without crepitation or pain   - Hypermobility of the 1st ray with foot loaded appreciated; 7 mm dorsiflexion, 7 mm Plantarflexion     Digital contracture 2nd      AJ ROM 5 degrees knee extended, 7 degrees knee flexed           [id number] Right foot xray  Radiology read: No fracture or dislocation. There is moderate hallux valgus with bunion formation bilaterally. There are bilateral hammertoes.    Independent read: Metatarsal adductus b/l, HAV b/l, Hammertoes b/l, atavistic 1st cuneiforms, mild TN un coverage b/l, minor eburnation at the bases of the 1st proximal phalanxes b/l    Metarsus adductus angle: 17 degrees   Mamadou's Angle: Aprox 36 degrees   True 1st IM angle: 11 + 17 -15 = 13 degrees          Assessment:       1. Hallux valgus of  left foot    2. Hav (hallux abducto valgus), right    3. Post-operative state    4. Right foot pain    5. Metatarsus adductus of both feet    6. S/P bunionectomy    7. Painful orthopaedic hardware    8. Valgus deformity of left great toe    9. Metatarsus adductus of left foot      Plan:   Hallux valgus of left foot    Hav (hallux abducto valgus), right    Post-operative state  -     pregabalin (LYRICA) 50 MG capsule; Take 2 capsules (100 mg total) by mouth 2 (two) times daily.  Dispense: 60 capsule; Refill: 6    Right foot pain  -     pregabalin (LYRICA) 50 MG capsule; Take 2 capsules (100 mg total) by mouth 2 (two) times daily.  Dispense: 60 capsule; Refill: 6    Metatarsus adductus of both feet    S/P bunionectomy    Painful orthopaedic hardware    Valgus deformity of left great toe    Metatarsus adductus of left foot      I provided patient education verbally regarding: Patient diagnosis, treatment options, as well as alternatives, risks, and benefits.     Surgical consult for patient with b/l painful HAV, Met adductus, and hammer toes. Conservative measures failed include shoe gear modifications, OTC orthotics, toe spacers, padding/strapping.     Reviewed foot radiographs and labs (CBC, CMP, A1C, EKG, CXR)    Long discussion with patient regarding the procedure in detail. Patient understands all risks, potential complications, and alternatives, including, but not limited to those listed on the consent form. All questions were answered. No guarantees given or implied as to outcome.     Plan for Left foot Lapidus Bunionectomy, potential akin osteotomy, arthrodesis of 2nd and 3rd TMTJ to correct met adductus, and L 2nd digit hammertoe correction/tendon capsule balancing.     Order placed for surgical intervention.     Anticipate surgery for 01/17/25.     NPO Midnight 01/16/25    Anesthesia: Local / MAC ; Exparil at procedure end for extended acute post operative pain relief    Weightbearing status for surgery post  op: Non WB in Short CAM walker boot with crutches     RTC 1 week after surgery and advised to call or come in sooner (prior to surgery date) for any further questions or concerns.     (This note was created using voice recognition software that occasionally misinterpreted phrases or words.)        Bebeto Gonzalez DPM  Podiatry / Foot and Ankle Surgery   Secure chat preferred

## 2025-01-08 NOTE — PATIENT INSTRUCTIONS
Plan for surgery 01/17/2024, Bunion and met adductus correction left foot, painful hardware removal right foot.

## 2025-01-15 ENCOUNTER — HOSPITAL ENCOUNTER (OUTPATIENT)
Dept: PREADMISSION TESTING | Facility: HOSPITAL | Age: 55
Discharge: HOME | End: 2025-01-15
Payer: COMMERCIAL

## 2025-01-15 VITALS
HEIGHT: 70 IN | WEIGHT: 240 LBS | BODY MASS INDEX: 34.36 KG/M2 | RESPIRATION RATE: 18 BRPM | OXYGEN SATURATION: 99 % | SYSTOLIC BLOOD PRESSURE: 166 MMHG | DIASTOLIC BLOOD PRESSURE: 91 MMHG | HEART RATE: 65 BPM | TEMPERATURE: 98 F

## 2025-01-15 DIAGNOSIS — Z01.818 PREOP TESTING: Primary | ICD-10-CM

## 2025-01-15 LAB
ANION GAP SERPL CALC-SCNC: 5 MMOL/L (ref 8–16)
BASOPHILS # BLD AUTO: 0.04 K/UL (ref 0–0.2)
BASOPHILS NFR BLD: 0.8 % (ref 0–1.9)
BUN SERPL-MCNC: 10 MG/DL (ref 6–20)
CALCIUM SERPL-MCNC: 9.5 MG/DL (ref 8.7–10.5)
CHLORIDE SERPL-SCNC: 105 MMOL/L (ref 95–110)
CO2 SERPL-SCNC: 29 MMOL/L (ref 23–29)
CREAT SERPL-MCNC: 0.9 MG/DL (ref 0.5–1.4)
DIFFERENTIAL METHOD BLD: ABNORMAL
EOSINOPHIL # BLD AUTO: 0.2 K/UL (ref 0–0.5)
EOSINOPHIL NFR BLD: 3.7 % (ref 0–8)
ERYTHROCYTE [DISTWIDTH] IN BLOOD BY AUTOMATED COUNT: 13.8 % (ref 11.5–14.5)
EST. GFR  (NO RACE VARIABLE): >60 ML/MIN/1.73 M^2
GLUCOSE SERPL-MCNC: 104 MG/DL (ref 70–110)
HCT VFR BLD AUTO: 41.4 % (ref 40–54)
HGB BLD-MCNC: 13.2 G/DL (ref 14–18)
IMM GRANULOCYTES # BLD AUTO: 0.01 K/UL (ref 0–0.04)
IMM GRANULOCYTES NFR BLD AUTO: 0.2 % (ref 0–0.5)
LYMPHOCYTES # BLD AUTO: 2 K/UL (ref 1–4.8)
LYMPHOCYTES NFR BLD: 38.8 % (ref 18–48)
MCH RBC QN AUTO: 28.5 PG (ref 27–31)
MCHC RBC AUTO-ENTMCNC: 31.9 G/DL (ref 32–36)
MCV RBC AUTO: 89 FL (ref 82–98)
MONOCYTES # BLD AUTO: 0.5 K/UL (ref 0.3–1)
MONOCYTES NFR BLD: 8.7 % (ref 4–15)
NEUTROPHILS # BLD AUTO: 2.5 K/UL (ref 1.8–7.7)
NEUTROPHILS NFR BLD: 47.8 % (ref 38–73)
NRBC BLD-RTO: 0 /100 WBC
PLATELET # BLD AUTO: 251 K/UL (ref 150–450)
PMV BLD AUTO: 10.6 FL (ref 9.2–12.9)
POTASSIUM SERPL-SCNC: 4.8 MMOL/L (ref 3.5–5.1)
RBC # BLD AUTO: 4.63 M/UL (ref 4.6–6.2)
SODIUM SERPL-SCNC: 139 MMOL/L (ref 136–145)
WBC # BLD AUTO: 5.2 K/UL (ref 3.9–12.7)

## 2025-01-15 PROCEDURE — 80048 BASIC METABOLIC PNL TOTAL CA: CPT | Performed by: ANESTHESIOLOGY

## 2025-01-15 PROCEDURE — 85025 COMPLETE CBC W/AUTO DIFF WBC: CPT | Performed by: ANESTHESIOLOGY

## 2025-01-15 RX ORDER — MULTIVITAMIN WITH IRON
1 TABLET ORAL DAILY
COMMUNITY

## 2025-01-15 RX ORDER — NAPROXEN SODIUM 220 MG
220 TABLET ORAL
COMMUNITY

## 2025-01-15 NOTE — DISCHARGE INSTRUCTIONS
To confirm, Your doctor has instructed you that surgery is scheduled for:  Friday 1/17/25    Pre-Op will call the afternoon prior to surgery between 4:00 and 6:00 PM with the final arrival time.      Please report to Registration at front of the hospital --it is best to park in the garage on [address]    Do not eat anything after midnight the night before your surgery - You may have clear liquids up to 2 hours before coming in for surgery. This included water, Gatorade, clear juice, black coffee, tea    YOU MAY BRUSH YOUR TEETH     TAKE APPROVED  MEDICATIONS WITH A SMALL SIP OF WATER THE MORNING OF YOUR PROCEDURE: See Medication list    PLEASE NOTE:  The surgery schedule has many variables which may affect the time of your surgery case.  Family members should be available if your surgery time changes.  Plan to be here the day of your procedure between 4-6 hours.    DO NOT TAKE THESE MEDICATIONS 5-7 DAYS PRIOR to your procedure or per your surgeon's request: ASPIRIN, ALEVE, ADVIL, IBUPROFEN,  NAVJOT SELTZER, BC , FISH OIL , VITAMIN E, HERBALS  (May take Tylenol)        IMPORTANT INSTRUCTIONS    Do not smoke, vape or drink alcoholic beverages 24 hours prior to your procedure.  Shower the night before AND the morning of your procedure with a Chlorhexidine wash such as Hibiclens or Dial antibacterial soap from the neck down.    Do not get it on your face or in your eyes.  You may use your own shampoo and face wash. This helps your skin to be as bacteria free as possible.   Do not apply any deodorant, lotion or powder after you shower.   DO NOT remove hair from the surgery site.  Do not shave the incision site unless you are given specific instructions to do so.    Sleep in a bed with clean sheets.  Do not sleep with a pet in the bed.   If you wear contact lenses, dentures, hearing aids or glasses, bring a container to put them in during surgery and give to a family member for safe keeping.    Please leave all jewelry,  piercing's and valuables at home.   Wear comfortable clothing that is easy to remove and place back on after surgery.     Make arrangements in advance for transportation home by a responsible adult.    You must make arrangements for transportation, TAXI'S, UBER'S OR LYFTS ARE NOT ALLOWED.      If you have any questions about these instructions, call Pre-Op Admit  Nursing at [phone] , Pre-Op Day Surgery Unit at [phone]

## 2025-01-17 ENCOUNTER — ANESTHESIA (OUTPATIENT)
Dept: SURGERY | Facility: HOSPITAL | Age: 55
End: 2025-01-17
Payer: COMMERCIAL

## 2025-01-17 ENCOUNTER — HOSPITAL ENCOUNTER (OUTPATIENT)
Facility: HOSPITAL | Age: 55
Discharge: HOME | End: 2025-01-17
Payer: COMMERCIAL

## 2025-01-17 ENCOUNTER — ANESTHESIA EVENT (OUTPATIENT)
Dept: SURGERY | Facility: HOSPITAL | Age: 55
End: 2025-01-17
Payer: COMMERCIAL

## 2025-01-17 VITALS
TEMPERATURE: 99 F | DIASTOLIC BLOOD PRESSURE: 81 MMHG | BODY MASS INDEX: 33.93 KG/M2 | RESPIRATION RATE: 16 BRPM | HEIGHT: 70 IN | HEART RATE: 76 BPM | WEIGHT: 237 LBS | SYSTOLIC BLOOD PRESSURE: 139 MMHG | OXYGEN SATURATION: 99 %

## 2025-01-17 DIAGNOSIS — M20.11 HALLUX VALGUS OF RIGHT FOOT: Primary | ICD-10-CM

## 2025-01-17 DIAGNOSIS — Q66.222 METATARSUS ADDUCTUS OF LEFT FOOT: ICD-10-CM

## 2025-01-17 DIAGNOSIS — M20.42 HAMMER TOE OF LEFT FOOT: ICD-10-CM

## 2025-01-17 DIAGNOSIS — Z01.818 PRE-OP EXAMINATION: ICD-10-CM

## 2025-01-17 DIAGNOSIS — M20.12 VALGUS DEFORMITY OF LEFT GREAT TOE: ICD-10-CM

## 2025-01-17 DIAGNOSIS — M79.672 LEFT FOOT PAIN: ICD-10-CM

## 2025-01-17 DIAGNOSIS — M20.12 HALLUX VALGUS OF LEFT FOOT: ICD-10-CM

## 2025-01-17 DIAGNOSIS — T84.84XA PAINFUL ORTHOPAEDIC HARDWARE: ICD-10-CM

## 2025-01-17 PROCEDURE — 71000015 HC POSTOP RECOV 1ST HR

## 2025-01-17 PROCEDURE — D9220A PRA ANESTHESIA: Mod: CRNA,,, | Performed by: NURSE ANESTHETIST, CERTIFIED REGISTERED

## 2025-01-17 PROCEDURE — 63600175 PHARM REV CODE 636 W HCPCS: Mod: JZ,TB

## 2025-01-17 PROCEDURE — 63600175 PHARM REV CODE 636 W HCPCS: Performed by: NURSE ANESTHETIST, CERTIFIED REGISTERED

## 2025-01-17 PROCEDURE — 36000709 HC OR TIME LEV III EA ADD 15 MIN

## 2025-01-17 PROCEDURE — 71000033 HC RECOVERY, INTIAL HOUR

## 2025-01-17 PROCEDURE — 99900035 HC TECH TIME PER 15 MIN (STAT)

## 2025-01-17 PROCEDURE — 25000003 PHARM REV CODE 250: Performed by: NURSE ANESTHETIST, CERTIFIED REGISTERED

## 2025-01-17 PROCEDURE — 36000708 HC OR TIME LEV III 1ST 15 MIN

## 2025-01-17 PROCEDURE — 28730 FUSION OF FOOT BONES: CPT | Mod: LT,,,

## 2025-01-17 PROCEDURE — 27201423 OPTIME MED/SURG SUP & DEVICES STERILE SUPPLY

## 2025-01-17 PROCEDURE — C1713 ANCHOR/SCREW BN/BN,TIS/BN: HCPCS

## 2025-01-17 PROCEDURE — 25000003 PHARM REV CODE 250: Performed by: ANESTHESIOLOGY

## 2025-01-17 PROCEDURE — 94799 UNLISTED PULMONARY SVC/PX: CPT

## 2025-01-17 PROCEDURE — 71000039 HC RECOVERY, EACH ADD'L HOUR

## 2025-01-17 PROCEDURE — 20680 REMOVAL OF IMPLANT DEEP: CPT | Mod: 51,,,

## 2025-01-17 PROCEDURE — 37000008 HC ANESTHESIA 1ST 15 MINUTES

## 2025-01-17 PROCEDURE — 37000009 HC ANESTHESIA EA ADD 15 MINS

## 2025-01-17 PROCEDURE — 28298 COR HLX VLGS PRX PHLX OSTEOT: CPT | Mod: 51,LT,,

## 2025-01-17 PROCEDURE — 63600175 PHARM REV CODE 636 W HCPCS: Mod: JZ,TB | Performed by: ANESTHESIOLOGY

## 2025-01-17 PROCEDURE — D9220A PRA ANESTHESIA: Mod: ANES,,, | Performed by: ANESTHESIOLOGY

## 2025-01-17 DEVICE — IMPLANTABLE DEVICE: Type: IMPLANTABLE DEVICE | Site: FOOT | Status: FUNCTIONAL

## 2025-01-17 DEVICE — SYTEM LAPIPLASTY 2 BIPLANER: Type: IMPLANTABLE DEVICE | Site: FOOT | Status: FUNCTIONAL

## 2025-01-17 DEVICE — SCREW FASTPTCH LOK 2.7X12/14MM: Type: IMPLANTABLE DEVICE | Site: FOOT | Status: FUNCTIONAL

## 2025-01-17 DEVICE — SCREW FASTPITCH LOK 2.7X16MM: Type: IMPLANTABLE DEVICE | Site: FOOT | Status: FUNCTIONAL

## 2025-01-17 DEVICE — SYS LAPIPLASTY S4 MULTPLNR 3.9: Type: IMPLANTABLE DEVICE | Site: FOOT | Status: FUNCTIONAL

## 2025-01-17 RX ORDER — PROPOFOL 10 MG/ML
VIAL (ML) INTRAVENOUS
Status: DISCONTINUED | OUTPATIENT
Start: 2025-01-17 | End: 2025-01-17

## 2025-01-17 RX ORDER — OXYCODONE HYDROCHLORIDE 5 MG/1
5 TABLET ORAL
Status: DISCONTINUED | OUTPATIENT
Start: 2025-01-17 | End: 2025-01-17 | Stop reason: HOSPADM

## 2025-01-17 RX ORDER — HYDROMORPHONE HYDROCHLORIDE 1 MG/ML
0.2 INJECTION, SOLUTION INTRAMUSCULAR; INTRAVENOUS; SUBCUTANEOUS EVERY 5 MIN PRN
Status: DISCONTINUED | OUTPATIENT
Start: 2025-01-17 | End: 2025-01-17 | Stop reason: HOSPADM

## 2025-01-17 RX ORDER — OXYCODONE HYDROCHLORIDE 5 MG/1
5 TABLET ORAL EVERY 4 HOURS PRN
Status: DISCONTINUED | OUTPATIENT
Start: 2025-01-17 | End: 2025-01-17 | Stop reason: HOSPADM

## 2025-01-17 RX ORDER — ROCURONIUM BROMIDE 10 MG/ML
INJECTION, SOLUTION INTRAVENOUS
Status: DISCONTINUED | OUTPATIENT
Start: 2025-01-17 | End: 2025-01-17

## 2025-01-17 RX ORDER — HYDROCODONE BITARTRATE AND ACETAMINOPHEN 5; 325 MG/1; MG/1
1 TABLET ORAL EVERY 4 HOURS PRN
Status: DISCONTINUED | OUTPATIENT
Start: 2025-01-17 | End: 2025-01-17 | Stop reason: HOSPADM

## 2025-01-17 RX ORDER — BUPIVACAINE HYDROCHLORIDE 5 MG/ML
INJECTION, SOLUTION EPIDURAL; INTRACAUDAL
Status: DISCONTINUED | OUTPATIENT
Start: 2025-01-17 | End: 2025-01-17 | Stop reason: HOSPADM

## 2025-01-17 RX ORDER — DIPHENHYDRAMINE HYDROCHLORIDE 50 MG/ML
12.5 INJECTION INTRAMUSCULAR; INTRAVENOUS ONCE AS NEEDED
Status: DISCONTINUED | OUTPATIENT
Start: 2025-01-17 | End: 2025-01-17 | Stop reason: HOSPADM

## 2025-01-17 RX ORDER — OXYCODONE AND ACETAMINOPHEN 10; 325 MG/1; MG/1
1 TABLET ORAL EVERY 6 HOURS PRN
Qty: 27 TABLET | Refills: 0 | Status: SHIPPED | OUTPATIENT
Start: 2025-01-17 | End: 2025-01-24 | Stop reason: SDUPTHER

## 2025-01-17 RX ORDER — PHENYLEPHRINE HYDROCHLORIDE 10 MG/ML
INJECTION INTRAVENOUS
Status: DISCONTINUED | OUTPATIENT
Start: 2025-01-17 | End: 2025-01-17

## 2025-01-17 RX ORDER — SUCCINYLCHOLINE CHLORIDE 20 MG/ML
INJECTION INTRAMUSCULAR; INTRAVENOUS
Status: DISCONTINUED | OUTPATIENT
Start: 2025-01-17 | End: 2025-01-17

## 2025-01-17 RX ORDER — ACETAMINOPHEN 10 MG/ML
INJECTION, SOLUTION INTRAVENOUS
Status: DISCONTINUED | OUTPATIENT
Start: 2025-01-17 | End: 2025-01-17

## 2025-01-17 RX ORDER — DEXMEDETOMIDINE HYDROCHLORIDE 100 UG/ML
INJECTION, SOLUTION INTRAVENOUS
Status: DISCONTINUED | OUTPATIENT
Start: 2025-01-17 | End: 2025-01-17

## 2025-01-17 RX ORDER — KETAMINE HCL IN 0.9 % NACL 50 MG/5 ML
SYRINGE (ML) INTRAVENOUS
Status: DISCONTINUED | OUTPATIENT
Start: 2025-01-17 | End: 2025-01-17

## 2025-01-17 RX ORDER — ONDANSETRON HYDROCHLORIDE 2 MG/ML
INJECTION, SOLUTION INTRAVENOUS
Status: DISCONTINUED | OUTPATIENT
Start: 2025-01-17 | End: 2025-01-17

## 2025-01-17 RX ORDER — DEXAMETHASONE SODIUM PHOSPHATE 4 MG/ML
INJECTION, SOLUTION INTRA-ARTICULAR; INTRALESIONAL; INTRAMUSCULAR; INTRAVENOUS; SOFT TISSUE
Status: DISCONTINUED | OUTPATIENT
Start: 2025-01-17 | End: 2025-01-17

## 2025-01-17 RX ORDER — ONDANSETRON HYDROCHLORIDE 2 MG/ML
4 INJECTION, SOLUTION INTRAVENOUS DAILY PRN
Status: DISCONTINUED | OUTPATIENT
Start: 2025-01-17 | End: 2025-01-17 | Stop reason: HOSPADM

## 2025-01-17 RX ORDER — BUPIVACAINE 13.3 MG/ML
INJECTION, SUSPENSION, LIPOSOMAL INFILTRATION
Status: DISCONTINUED | OUTPATIENT
Start: 2025-01-17 | End: 2025-01-17 | Stop reason: HOSPADM

## 2025-01-17 RX ORDER — SODIUM CHLORIDE, SODIUM LACTATE, POTASSIUM CHLORIDE, CALCIUM CHLORIDE 600; 310; 30; 20 MG/100ML; MG/100ML; MG/100ML; MG/100ML
INJECTION, SOLUTION INTRAVENOUS CONTINUOUS PRN
Status: DISCONTINUED | OUTPATIENT
Start: 2025-01-17 | End: 2025-01-17

## 2025-01-17 RX ORDER — LIDOCAINE HYDROCHLORIDE 10 MG/ML
INJECTION, SOLUTION EPIDURAL; INFILTRATION; INTRACAUDAL; PERINEURAL
Status: DISCONTINUED | OUTPATIENT
Start: 2025-01-17 | End: 2025-01-17

## 2025-01-17 RX ORDER — ONDANSETRON 4 MG/1
4 TABLET, ORALLY DISINTEGRATING ORAL ONCE
Status: DISCONTINUED | OUTPATIENT
Start: 2025-01-17 | End: 2025-01-17 | Stop reason: HOSPADM

## 2025-01-17 RX ORDER — SODIUM CHLORIDE 0.9 % (FLUSH) 0.9 %
10 SYRINGE (ML) INJECTION
Status: DISCONTINUED | OUTPATIENT
Start: 2025-01-17 | End: 2025-01-17 | Stop reason: HOSPADM

## 2025-01-17 RX ORDER — GLUCAGON 1 MG
1 KIT INJECTION
Status: DISCONTINUED | OUTPATIENT
Start: 2025-01-17 | End: 2025-01-17 | Stop reason: HOSPADM

## 2025-01-17 RX ORDER — FAMOTIDINE 10 MG/ML
INJECTION INTRAVENOUS
Status: DISCONTINUED | OUTPATIENT
Start: 2025-01-17 | End: 2025-01-17

## 2025-01-17 RX ORDER — MIDAZOLAM HYDROCHLORIDE 1 MG/ML
INJECTION INTRAMUSCULAR; INTRAVENOUS
Status: DISCONTINUED | OUTPATIENT
Start: 2025-01-17 | End: 2025-01-17

## 2025-01-17 RX ORDER — FENTANYL CITRATE 50 UG/ML
INJECTION, SOLUTION INTRAMUSCULAR; INTRAVENOUS
Status: DISCONTINUED | OUTPATIENT
Start: 2025-01-17 | End: 2025-01-17

## 2025-01-17 RX ADMIN — ROCURONIUM BROMIDE 5 MG: 10 INJECTION, SOLUTION INTRAVENOUS at 08:01

## 2025-01-17 RX ADMIN — HYDROMORPHONE HYDROCHLORIDE 0.2 MG: 1 INJECTION, SOLUTION INTRAMUSCULAR; INTRAVENOUS; SUBCUTANEOUS at 02:01

## 2025-01-17 RX ADMIN — MIDAZOLAM HYDROCHLORIDE 2 MG: 1 INJECTION, SOLUTION INTRAMUSCULAR; INTRAVENOUS at 07:01

## 2025-01-17 RX ADMIN — Medication 20 MG: at 08:01

## 2025-01-17 RX ADMIN — OXYCODONE HYDROCHLORIDE 5 MG: 5 TABLET ORAL at 02:01

## 2025-01-17 RX ADMIN — ROCURONIUM BROMIDE 25 MG: 10 INJECTION, SOLUTION INTRAVENOUS at 10:01

## 2025-01-17 RX ADMIN — HYDROMORPHONE HYDROCHLORIDE 0.2 MG: 1 INJECTION, SOLUTION INTRAMUSCULAR; INTRAVENOUS; SUBCUTANEOUS at 03:01

## 2025-01-17 RX ADMIN — DEXAMETHASONE SODIUM PHOSPHATE 8 MG: 4 INJECTION, SOLUTION INTRAMUSCULAR; INTRAVENOUS at 08:01

## 2025-01-17 RX ADMIN — Medication 10 MG: at 10:01

## 2025-01-17 RX ADMIN — PROPOFOL 150 MG: 10 INJECTION, EMULSION INTRAVENOUS at 08:01

## 2025-01-17 RX ADMIN — FENTANYL CITRATE 50 MCG: 50 INJECTION, SOLUTION INTRAMUSCULAR; INTRAVENOUS at 11:01

## 2025-01-17 RX ADMIN — DEXMEDETOMIDINE HYDROCHLORIDE 8 MCG: 100 INJECTION, SOLUTION INTRAVENOUS at 10:01

## 2025-01-17 RX ADMIN — ROCURONIUM BROMIDE 20 MG: 10 INJECTION, SOLUTION INTRAVENOUS at 08:01

## 2025-01-17 RX ADMIN — FAMOTIDINE 20 MG: 10 INJECTION, SOLUTION INTRAVENOUS at 11:01

## 2025-01-17 RX ADMIN — DEXMEDETOMIDINE HYDROCHLORIDE 12 MCG: 100 INJECTION, SOLUTION INTRAVENOUS at 01:01

## 2025-01-17 RX ADMIN — SUGAMMADEX 200 MG: 100 INJECTION, SOLUTION INTRAVENOUS at 01:01

## 2025-01-17 RX ADMIN — DEXMEDETOMIDINE HYDROCHLORIDE 8 MCG: 100 INJECTION, SOLUTION INTRAVENOUS at 01:01

## 2025-01-17 RX ADMIN — Medication 120 MG: at 08:01

## 2025-01-17 RX ADMIN — PHENYLEPHRINE HYDROCHLORIDE 100 MCG: 10 INJECTION INTRAVENOUS at 08:01

## 2025-01-17 RX ADMIN — ACETAMINOPHEN 1000 MG: 10 INJECTION, SOLUTION INTRAVENOUS at 11:01

## 2025-01-17 RX ADMIN — LIDOCAINE HYDROCHLORIDE 50 MG: 10 INJECTION, SOLUTION EPIDURAL; INFILTRATION; INTRACAUDAL; PERINEURAL at 08:01

## 2025-01-17 RX ADMIN — ROCURONIUM BROMIDE 25 MG: 10 INJECTION, SOLUTION INTRAVENOUS at 08:01

## 2025-01-17 RX ADMIN — FENTANYL CITRATE 100 MCG: 50 INJECTION, SOLUTION INTRAMUSCULAR; INTRAVENOUS at 08:01

## 2025-01-17 RX ADMIN — Medication 20 MG: at 09:01

## 2025-01-17 RX ADMIN — SODIUM CHLORIDE, SODIUM LACTATE, POTASSIUM CHLORIDE, AND CALCIUM CHLORIDE: .6; .31; .03; .02 INJECTION, SOLUTION INTRAVENOUS at 07:01

## 2025-01-17 RX ADMIN — ROCURONIUM BROMIDE 25 MG: 10 INJECTION, SOLUTION INTRAVENOUS at 11:01

## 2025-01-17 RX ADMIN — ONDANSETRON 4 MG: 2 INJECTION INTRAMUSCULAR; INTRAVENOUS at 01:01

## 2025-01-17 NOTE — OP NOTE
[facility]  Operative Note      Date of Procedure: 1/17/2025     Procedure: Procedure(s) (LRB):  BUNIONECTOMY, LAPIDUS (Left)  Resection of 1st metartarsal head medial eminence (Left)   1st MTPJ Capsule tendon balancing (Left)   2nd and 3rd metarsal angular correction with osteotomy and fusion of 2nd and 3rd TMTJ (Left)   Akin Osteotomy 1st proximal phalanx (Left)   OSTEOTOMY, FOOT (Left)  REMOVAL, HARDWARE, FOOT (Right)     Surgeons and Role:     * Bebeto Gonzalez DPM - Primary    Assisting Surgeon:      * Guicho ALCANTARA     Pre-Operative Diagnosis: Hallux valgus of left foot [M20.12]  Metatarsus adductus of left foot [Q66.222]  Valgus deformity of left great toe [M20.12]  Hammer toe of left foot [M20.42]  Painful orthopaedic hardware [T84.84XA]  Left foot pain [M79.672]    Post-Operative Diagnosis: Post-Op Diagnosis Codes:     * Hallux valgus of left foot [M20.12]     * Metatarsus adductus of left foot [Q66.222]     * Valgus deformity of left great toe [M20.12]     * Hammer toe of left foot [M20.42]     * Painful orthopaedic hardware [T84.84XA]     * Left foot pain [M79.672]    Anesthesia: General / Local     Operative Findings (including complications, if any): No complications.    Description of Technical Procedures:     The patient was transported to the operating room on a stretcher and was transferred to the OR table in supine position. Anesthesia was induced.  A tourniquet was applied to the right ankle and left thigh. The right and left lower limb was prepped and draped in a sterile manner.     Right tourniquet(s) inflated to 250 mmHg. Attention was directed to the Right great toe. A partial thickness incision vill[place]uerte to subcutaneous tissue was made, and carried down to hardware bluntly with mosquito hemostats. Using a small osteotome and heavy needle drivers the staple was removed.        Large C arm fluoroscopy was used to map out an incision over the Left 3rd TMT joint. A 6-8cm incision  over the 3rd metatarsal with sharp and dull periosteal dissection, With care taken to preserve and retract neurovasclar structures and tendons. Superficial bleeders ligate with electrocautery. The EDB muscle belly was partially incised for visualization. Once the 2nd and 3rd TMTJ were exposed soft tissues were reflected, the 3rd metatarsal was freed from the 4th, the 2nd and 3rd TMTs were planed with the aductoplasty planer, and a cut guide was placed in the joint space and secured with 1/2 pins. Fluro and a sagittal saw was used to resected wedges from the base of the 2nd and 3rd metatarsal, osteotome used to removed wedges, shifting the forefoot from an adducted position to a rectus position. Ligamentous attachments between the 2nd and 3rd metatarsals was preserved. Site was copiously irrigated, followed by fenestration of the fusion sites. Compression was placed at the joint, joint compression, and locking plates and screws were placed over the 2nd and 3rd TMTs with good alignment and position confirmed with flouro.       Attention was directed to the dorsal left first ray. A skin marker was used to plan a linear dorsal medial incision beginning proximal to the 1st metatarsal base and extending distally at the 1st PIPJ along the medial aspect of the EHL tendon. A #15 blade was used to create a controlled depth incision followed by blunt dissection through skin to subcutaneous tissue with care taken to retract and protect neurovascular structures. Superficial veins were ligated as necessary with monopolar Bovie electrocautery. The subcutaneous tissues were  from the deep fascia and a dorsal medial capsular incision was then preformed followed by subperiosteal dissection, exposing and mobilizing the 1st tarsalmetatarsal joint.      Next, attention was directed to the 1st MTPJ.  A joint sparing sequential lateral release was performed incising the fibular suspensory sesamoid ligament, and lateral 1 MTP  capsule.      The 3 in 1 positioner with cut guide was temporarily applied. The Lapiplasty Lapidus 1st metatarsal cuneiform fusion system utilized. 1cm controlled depth incision preformed with #15 blade down to subcutaneous tissue, over 2nd metatarsal at the level of the 5th metatarsal head. Blunt dissection via mosquito forceps to bone carried out. Temporary fixation K wire placed at proximal 1st metatarsal shaft directed medial to lateral to 5th metatarsal head. The metatarsal was reduced with the compression clamp inserted, secured, and temporary olive wire fixation medially. Improvement in sesamoid alignment and adequate reduction verified under fluoroscopy. Olive wires were driven into cut guide and the subchondral base of the 1st metatarsal and distal subchondral surface of the medial cuneiform were resected using sagittal saw with 138 blade. Cut guide removed from field, distractor applied to olive wires and 1st TMTJ was distracted. Osteochondral fragments were excised and the joint flushed with NS. Both surfaces were fenestrated with a 2.0 mm drill bit. A ronguer was employed to resect medial and dorsal flares of the 1st metatarsal base. Distractor, compression clamp, and temporary fixation wire were removed and taken off the field. Under fluoroscopic guidance with confirmation of adequate and improved reduction, alignment, and sesamoid position, two crossing olive wires were driven from distal to proximal through the 1st metatarsal base and medial cuneiform. Two four screw locking plates were placed and temporarily fixated with two olive wires each, one to the 1st TMTJ dorsally and the other over the 1st TMTJ medially. Adequate reduction, alignment, and sesamoid position was verified once more before 2.0 mm under-drill preformed. Eight locking screws were employed in securing the plates. Four screws were hand tightened, two to each plate, at the distal and proximal aspects. After the remaining olive wires  were removed and taken off the field, and the repeat for preformed for the other screw holes of each plate. Appropriate hardware placement and reduction of the 1st TMTJ fusion fluoroscopically confirmed.       The incision was extended to the PIPJ made at the dorsal medial aspect of the 1st MTPJ through the skin down to SQ followed by blunt dissection down to the deep fascia and capsule. A linear capsular incision was made followed by subperiosteal dissection to the expose the medial eminence which was resected with a sagittal saw and osteotome preserving the medial sagittal groove.      Fluoroscopic imaging reviewed noting residual increase in hallux interphalangeus angle therefore decision was made to perform an Akin osteotomy at the mid aspect of the proximal phalanx.  A dorsal medial incision was made medial to the EHL tendon and centered over the proximal phalanx in a controlled depth manner through the skin down to subcutaneous tissue.  Combination of sharp and blunt dissection was taken through superficial fascia down to deep fascia.  A linear periosteal incision was then created followed by subperiosteal dissection.  The EHL tendon was then retracted laterally.  A microsagittal saw was then utilized to perform a closing medial based wedge osteotomy at the mid aspect of the proximal phalanx with intact lateral hinge that was gradually feathered closing down the osteotomy.  Permanent fixation was then completed with a Nitinol staple with good alignment confirmed fluoroscopically.  Clinically the toe appeared to be straight.     A wedge was excised from the capsule of the 1st MTPJ and medial capsulorraphy with toe held in slight over correction with the MTPJ was re-approximated.      Bone graft was place in all arthrodesis sites.      At this point it was determined with the adjacent correction a hammertoe procedure entailing soft tissue balancing was not indicated, discussed with patient prior to procedure.      Tapestry biological implant was applied over the hardware and in the closure sites.       Closure was preformed with 3-0 and 4-0 vicryl and 3-0 nylon.      The surgical site was irrigated with copious saline solution via bulb syringe.  The surgical site was dressed with xeroform, 4x4 gauze, cast padding, and ACE wrap. Tourniquet(s) deflated.  Boot application preformed. .     Significant Surgical Tasks Conducted by the Assistant(s), if Applicable: Some superficial closure, retraction, dressings    Estimated Blood Loss (EBL): 100 mL           Implants:   Implant Name Type Inv. Item Serial No.  Lot No. LRB No. Used Action   SYTEM LAPIPLASTY 2 BIPLANER - [id number]  SYTEM LAPIPLASTY 2 BIPLANER  [employer] [id number] Left 1 Implanted   SYS LAPIPLASTY S4 MULTPLNR 3.9 - [id number]  SYS LAPIPLASTY S4 MULTPLNR 3.9  [employer] [id number] Left 1 Implanted   SCREW FASTPTCH WILLIAMS 2.7X12/14MM - [id number]  SCREW FASTPTCH WILLIAMS 2.7X12/14MM  [employer] [id number] Left 2 Implanted   SCREW FASTPITCH WILLIAMS 2.7X16MM - [id number]  SCREW FASTPITCH WILLIAMS 2.7X16MM  [employer] [id number] Left 1 Implanted   BONE GRAFT PUTTY    [employer] [id number] Left 1 Implanted   TAPESTRY BIOINTEGRATIVE IMPLANT    [employer] [id number] Left 1 Implanted   REFLEX MINI NITINOL STAPLE KIT    [employer] [id number] Left 1 Implanted       Specimens:   Specimen (24h ago, onward)      None                    Condition: Good    Disposition: PACU - hemodynamically stable.    Attestation: I was present and scrubbed for the entire procedure.    Discharge Note    OUTCOME: Patient tolerated treatment/procedure well without complication and is now ready for discharge.    DISPOSITION: Home or Self Care    FINAL DIAGNOSIS:  <principal problem not specified>    FOLLOWUP: In clinic    DISCHARGE INSTRUCTIONS:  No discharge procedures on file.

## 2025-01-17 NOTE — TRANSFER OF CARE
"Anesthesia Transfer of Care Note    Patient: Kevin Ca Jr.    Procedure(s) Performed: Procedure(s) (LRB):  BUNIONECTOMY, LAPIDUS (Left)  RECONSTRUCTION, CHARCOT FOOT, WITH FUSION (Left)  CORRECTION, HAMMER TOE (Left)  OSTEOTOMY, FOOT (Left)  REMOVAL, HARDWARE, FOOT (Right)    Patient location: PACU    Anesthesia Type: general    Transport from OR: Transported from OR on 2-3 L/min O2 by NC with adequate spontaneous ventilation    Post pain: adequate analgesia    Post assessment: no apparent anesthetic complications    Post vital signs: stable    Level of consciousness: awake    Nausea/Vomiting: no nausea/vomiting    Complications: none    Transfer of care protocol was followed    Last vitals: Visit Vitals  /84   Pulse 68   Temp 36.4 °C (97.5 °F) (Oral)   Resp 18   Ht 5' 9.5" (1.765 m)   Wt 107.5 kg (237 lb)   SpO2 97%   BMI 34.50 kg/m²     "

## 2025-01-17 NOTE — ANESTHESIA PREPROCEDURE EVALUATION
01/17/2025  Kevin Ca Jr. is a 54 y.o., male.        Results for orders placed or performed during the hospital encounter of 08/15/24   EKG 12-lead    Collection Time: 08/15/24  3:45 PM   Result Value Ref Range    QRS Duration 102 ms    [facility] QTC Calculation 408 ms    Narrative    Test Reason : Z01.818,    Vent. Rate : 066 BPM     Atrial Rate : 066 BPM     P-R Int : 144 ms          QRS Dur : 102 ms      QT Int : 390 ms       P-R-T Axes : 060 087 030 degrees     QTc Int : 408 ms    Normal sinus rhythm  Normal ECG  When compared with ECG of 12-JUL-2019 09:23,  No significant change was found  Confirmed by Bk Helm MD ([id number]) on 8/18/2024 3:17:37 PM    Referred By:             Confirmed By:Bk Helm MD             Lab Results   Component Value Date    WBC 5.20 01/15/2025    HGB 13.2 (L) 01/15/2025    HCT 41.4 01/15/2025    MCV 89 01/15/2025     01/15/2025     BMP  Lab Results   Component Value Date     01/15/2025    K 4.8 01/15/2025     01/15/2025    CO2 29 01/15/2025    BUN 10 01/15/2025    CREATININE 0.9 01/15/2025    CALCIUM 9.5 01/15/2025    ANIONGAP 5 (L) 01/15/2025     01/15/2025    GLU 94 08/15/2024    GLU 95 05/01/2024       No results found for this or any previous visit.              Pre-op Assessment    I have reviewed the Patient Summary Reports.     I have reviewed the Nursing Notes. I have reviewed the NPO Status.   I have reviewed the Medications.     Review of Systems  Anesthesia Hx:  No problems with previous Anesthesia             Denies Family Hx of Anesthesia complications.    Denies Personal Hx of Anesthesia complications.                    Social:  Non-Smoker, No Alcohol Use       Hematology/Oncology:  Hematology Normal   Oncology Normal                                   EENT/Dental:  EENT/Dental Normal           Cardiovascular:  Cardiovascular  Normal                  ECG has been reviewed.                            Pulmonary:  Pulmonary Normal                       Hepatic/GI:     GERD, well controlled                Musculoskeletal:  Arthritis   Hallux interphalangeus, acquired, right  Hallux valgus of right foot  Metatarsus adductus of both feet           Spine Disorders: lumbar Chronic Pain           Neurological:  Neurology Normal                                      Endocrine:     A1C slightly elevated      Obesity / BMI > 30  Dermatological:  Skin Normal    Psych:  Psychiatric Normal                    Physical Exam  General: Well nourished, Cooperative, Alert and Oriented    Airway:  Mallampati: III / II  Mouth Opening: Normal  TM Distance: > 6 cm  Tongue: Normal  Neck ROM: Normal ROM    Dental:  Intact    Chest/Lungs:  Clear to auscultation, Normal Respiratory Rate    Heart:  Rate: Normal  Rhythm: Regular Rhythm  Sounds: Normal    Abdomen:  Normal, Soft, Nontender        Anesthesia Plan  Type of Anesthesia, risks & benefits discussed:    Anesthesia Type: Gen ETT  Intra-op Monitoring Plan: Standard ASA Monitors  Post Op Pain Control Plan: multimodal analgesia and IV/PO Opioids PRN  Induction:  IV  Airway Plan: Video, Post-Induction  Informed Consent: Informed consent signed with the Patient and all parties understand the risks and agree with anesthesia plan.  All questions answered.   ASA Score: 2  Anesthesia Plan Notes:         GETA  Decadron 8mg, iv Zofran 4mg iv, Pepcid 20mg iv   Ofirmev 1000mg iv, Ketamine iv,Small dose Precedex iv   Sugammadex        Ready For Surgery From Anesthesia Perspective.     .

## 2025-01-17 NOTE — INTERVAL H&P NOTE
The patient has been examined and the H&P has been reviewed:    I concur with the findings and no changes have occurred since H&P was written.    Surgery risks, benefits and alternative options discussed and understood by patient/family.      There are no hospital problems to display for this patient.       Expand All Collapse All      [address]  [place] [address]  Phone: [phone]   [phone]     Patient's PCP:Elliott Robert MD  Referring Provider: No ref. provider found     Subjective:      Subjective  Chief Complaint:: Surgical Consultation Chronic Left Foot Pain      HPI  Kevin Ca Jr. is a 54 y.o. male who presents today Kevin Ca Jr. is a 54 y.o. male who presents s/p Bunionectomy, Lapidus bunionectomy with 1st TMTJ and resection of medial eminence (Right) /Angular correction if metatarsus adductus with fusions following closing bases taking from the base of the 2nd and 3rd TMTJs, 1st Proximal phalanx interphalangeus correction with akin osteotomy, RT 8/16/2024. PT denies any new complaints at this time. PT presents today in normal shoes. PT states he does have some pain in his RT 1st. Now that he is healed up and using the right foot without any issues he would like to discuss surgery for the left foot. The current episode on the left started over a 1 year ago.  The symptoms include throbbing pain. The symptoms are aggravated by shoes, standing. The symptoms have progressively worsened. Treatment to date have included shoe modifications, OTC inserts which provided no relief.          Vitals:     01/08/25 0813   Weight: 111 kg (244 lb 11.4 oz)   PainSc:   5      Shoe Size: 10           Past Surgical History:   Procedure Laterality Date    ARTHROSCOPY OF KNEE Right 07/16/2019     Procedure: ARTHROSCOPY, KNEE;  Surgeon: Ganesh Berry MD;  Location: [facility];  Service: Orthopedics;  Laterality: Right;    BUNIONECTOMY Right 08/16/2024     Procedure: BUNIONECTOMY;   Surgeon: Bebeto Gonzalez DPM;  Location: [facility];  Service: Podiatry;  Laterality: Right;  Pineda notified j 8/15    CORRECTION OF HAMMER TOE Left 1/17/2025     Procedure: CORRECTION, HAMMER TOE;  Surgeon: Bebeto Gonzalez DPM;  Location: [facility] OR;  Service: Podiatry;  Laterality: Left;  With tendon and capsule balancing    FOOT HARDWARE REMOVAL Right 1/17/2025     Procedure: REMOVAL, HARDWARE, FOOT;  Surgeon: Bebeto Gonzalez DPM;  Location: [facility] OR;  Service: Podiatry;  Laterality: Right;    FOOT OSTEOTOMY Left 1/17/2025     Procedure: OSTEOTOMY, FOOT;  Surgeon: Bebeto Gonzalez DPM;  Location: [facility] OR;  Service: Podiatry;  Laterality: Left;    HAND TENDON SURGERY Left 1992    HERNIA REPAIR Right 04/12/2017     with mesh    LAPIDUS BUNIONECTOMY Left 1/17/2025     Procedure: BUNIONECTOMY, LAPIDUS;  Surgeon: Bebeto Gonzalez DPM;  Location: [facility] OR;  Service: Podiatry;  Laterality: Left;    MIDFOOT ARTHRODESIS Right 08/16/2024     Procedure: FUSION, JOINT, MIDFOOT;  Surgeon: Bebeto Gonzalez DPM;  Location: [facility] OR;  Service: Podiatry;  Laterality: Right;    RECONSTRUCTION WITH FUSION OF CHARCOT FOOT Left 1/17/2025     Procedure: RECONSTRUCTION, CHARCOT FOOT, WITH FUSION;  Surgeon: Bebeto Gonzalez DPM;  Location: [facility] OR;  Service: Podiatry;  Laterality: Left;    REPAIR OF MENISCUS OF KNEE Right 07/16/2019     Procedure: REPAIR, MENISCUS, KNEE;  Surgeon: Ganesh Berry MD;  Location: [facility];  Service: Orthopedics;  Laterality: Right;    right foot fracture   2005           Past Medical History:   Diagnosis Date    Arthritis      Back pain      GERD (gastroesophageal reflux disease)               Family History   Problem Relation Name Age of Onset    Cancer Maternal Grandfather             Social History:   Marital Status:   Alcohol History:  reports no history of alcohol use.  Tobacco History:  reports that he has never smoked. He has never used smokeless tobacco.  Drug History:  reports no history of  drug use.     Review of patient's allergies indicates:  No Known Allergies    Current Medications          Current Outpatient Medications   Medication Sig Dispense Refill    multivitamin (ONE DAILY MULTIVITAMIN) per tablet Take 1 tablet by mouth once daily.        naproxen sodium (ANAPROX) 220 MG tablet Take 220 mg by mouth every 12 (twelve) hours.        omega-3 fatty acids/fish oil (FISH OIL-OMEGA-3 FATTY ACIDS) 300-1,000 mg capsule Take 1 capsule by mouth once daily.        omeprazole (PRILOSEC) 40 MG capsule Take 1 capsule (40 mg total) by mouth once daily. 90 capsule 3    oxyCODONE-acetaminophen (PERCOCET)  mg per tablet Take 1 tablet by mouth every 6 (six) hours as needed for Pain. 27 tablet 0    pregabalin (LYRICA) 50 MG capsule Take 2 capsules (100 mg total) by mouth 2 (two) times daily. 60 capsule 6      No current facility-administered medications for this visit.            Review of Systems   Constitutional:  Negative for chills, fatigue, fever and unexpected weight change.   HENT:  Negative for hearing loss and trouble swallowing.    Eyes:  Negative for photophobia and visual disturbance.   Respiratory:  Negative for cough, shortness of breath and wheezing.    Cardiovascular:  Negative for chest pain, palpitations and leg swelling.   Gastrointestinal:  Negative for abdominal pain and nausea.   Genitourinary:  Negative for dysuria and frequency.   Musculoskeletal:  Negative for arthralgias, back pain, gait problem, joint swelling, myalgias and neck pain.   Skin:  Negative for rash and wound.   Neurological:  Negative for tremors, seizures, weakness, numbness and headaches.   Hematological:  Does not bruise/bleed easily.   Psychiatric/Behavioral:  Negative for hallucinations.             Objective:      Objective  Physical Exam:   Foot Exam     General  General Appearance: appears stated age and healthy   Orientation: alert and oriented to person, place, and time   Affect: appropriate         Right  Foot/Ankle      Inspection and Palpation  Ecchymosis: none  Tenderness: none   Swelling: none   Arch: pes planus  Hammertoes: second toe  Hallux valgus: yes  Hallux limitus: no  Skin Exam: skin intact;   Fungus Toenails: not present  Neurovascular  Dorsalis pedis: 3+  Posterior tibial: 3+  Capillary Refill: 3+  Varicose veins: not present  Saphenous nerve sensation: normal  Tibial nerve sensation: normal  Superficial peroneal nerve sensation: normal  Deep peroneal nerve sensation: normal  Sural nerve sensation: normal     Muscle Strength  Ankle dorsiflexion: 5  Ankle plantar flexion: 5  Ankle inversion: 5  Ankle eversion: 5  Great toe extension: 5  Great toe flexion: 5     Range of Motion     Passive  Ankle dorsiflexion: 10  Ankle eversion: 10  Ankle inversion: 20  1st MTP extension: 35        Tests  PT Tinel's sign: negative    Heel raise: normal control   Valleix sign: negative     Left Foot/Ankle       Inspection and Palpation  Ecchymosis: none  Tenderness: (1st metatarsal distal medial prominence)  Arch: pes planus  Hammertoes: second toe  Hallux valgus: yes  Hallux limitus: no  Skin Exam: skin intact;   Fungus Toenails: not present     Neurovascular  Dorsalis pedis: 3+  Posterior tibial: 3+  Capillary refill: 3+  Varicose veins: not present  Saphenous nerve sensation: normal  Tibial nerve sensation: normal  Superficial peroneal nerve sensation: normal  Deep peroneal nerve sensation: normal  Sural nerve sensation: normal     Muscle Strength  Ankle dorsiflexion: 5  Ankle plantar flexion: 5  Ankle inversion: 5  Ankle eversion: 5  Great toe extension: 5  Great toe flexion: 5     Range of Motion     Passive  Ankle dorsiflexion: 10  Ankle eversion: 10  Ankle inversion: 20  1st MTP extension: 60        Tests  PT Tinel's sign: negative  Heel raise: normal control Valleix sign: negative  Comments  HAV  - Tracking   - 1st MTPJ ROM WNL, Smooth, without crepitation or pain   - Hypermobility of the 1st ray with foot loaded  appreciated; 7 mm dorsiflexion, 7 mm Plantarflexion      Digital contracture 2nd       AJ ROM 5 degrees knee extended, 7 degrees knee flexed            [id number] Right foot xray  Radiology read: No fracture or dislocation. There is moderate hallux valgus with bunion formation bilaterally. There are bilateral hammertoes.     Independent read: Metatarsal adductus b/l, HAV b/l, Hammertoes b/l, atavistic 1st cuneiforms, mild TN un coverage b/l, minor eburnation at the bases of the 1st proximal phalanxes b/l     Metarsus adductus angle: 17 degrees   Mamadou's Angle: Aprox 36 degrees   True 1st IM angle: 11 + 17 -15 = 13 degrees                       Assessment:      Plan  1. Hallux valgus of left foot    2. Metatarsus adductus of both feet    3. S/P bunionectomy    4. Painful orthopaedic hardware    5. Valgus deformity of left great toe    6. Metatarsus adductus of left foot    7. Left foot pain    8. Right foot pain    9. Hammertoe of left foot       Plan:   Hallux valgus of left foot     Metatarsus adductus of both feet     S/P bunionectomy     Painful orthopaedic hardware     Valgus deformity of left great toe     Metatarsus adductus of left foot     Left foot pain     Right foot pain  -     pregabalin (LYRICA) 50 MG capsule; Take 2 capsules (100 mg total) by mouth 2 (two) times daily.  Dispense: 60 capsule; Refill: 6     Hammertoe of left foot        I provided patient education verbally regarding: Patient diagnosis, treatment options, as well as alternatives, risks, and benefits.      Surgical consult for patient with b/l painful HAV, Met adductus, and hammer toes. Conservative measures failed include shoe gear modifications, OTC orthotics, toe spacers, padding/strapping.      Reviewed foot radiographs and labs (CBC, CMP, A1C, EKG, CXR)     Long discussion with patient regarding the procedure in detail. Patient understands all risks, potential complications, and alternatives, including, but not limited to  those listed on the consent form. All questions were answered. No guarantees given or implied as to outcome.      Plan for Left foot Lapidus Bunionectomy, potential akin osteotomy, arthrodesis of 2nd and 3rd TMTJ to correct met adductus, and possible L 2nd digit hammertoe correction/tendon capsule balancing. Removal of Right great toe akin staple, possibly communicating into joint preventing additional 1st MTPJ ROM.      Order placed for surgical intervention.      Anticipate surgery for 01/17/25.      NPO Midnight 01/16/25     Anesthesia: Local / MAC ; Exparil at procedure end for extended acute post operative pain relief     Weightbearing status for surgery post op: LLE Non WB in Short CAM walker boot with crutches      RTC 1 week after surgery and advised to call or come in sooner (prior to surgery date) for any further questions or concerns.      (This note was created using voice recognition software that occasionally misinterpreted phrases or words.)         Bebeto Gonzalez DPM  Podiatry / Foot and Ankle Surgery   Secure chat preferred                Electronically signed by Bebeto Gonzalez DPM at 1/17/2025  7:51 AM  Electronically signed by Bebeto Gonzalez DPM at 2/9/2025 10:42 PM  Electronically signed by Bebeto Gonzalez DPM at 2/9/2025 10:43 PM

## 2025-01-17 NOTE — ANESTHESIA POSTPROCEDURE EVALUATION
Anesthesia Post Evaluation    Patient: Kevin Ca Jr.    Procedure(s) Performed: Procedure(s) (LRB):  BUNIONECTOMY, LAPIDUS (Left)  RECONSTRUCTION, CHARCOT FOOT, WITH FUSION (Left)  CORRECTION, HAMMER TOE (Left)  OSTEOTOMY, FOOT (Left)  REMOVAL, HARDWARE, FOOT (Right)    Final Anesthesia Type: general      Patient location during evaluation: PACU  Patient participation: Yes- Able to Participate  Level of consciousness: awake and alert  Post-procedure vital signs: reviewed and stable  Pain management: adequate  Airway patency: patent    PONV status at discharge: No PONV  Anesthetic complications: no      Cardiovascular status: stable  Respiratory status: unassisted and spontaneous ventilation  Hydration status: euvolemic  Follow-up not needed.  Comments: Patient's only complaint is pain at his left antecubital fossa, when he fully extends his elbow.  That area is completely clear with no erythema, swelling, or mass effect.  It has improved significantly with pain medication.  Both forearms and upper arms appear to be the same circumference.  This pain may be due to positioning during this extremely long surgery.  I expect full resolution.              Vitals Value Taken Time   /76 01/17/25 1615   Temp 36.8 °C (98.3 °F) 01/17/25 1530   Pulse 84 01/17/25 1621   Resp 20 01/17/25 1620   SpO2 96 % 01/17/25 1621   Vitals shown include unfiled device data.      Event Time   Out of Recovery 16:23:04         Pain/Beti Score: Pain Rating Prior to Med Admin: 8 (1/17/2025  3:12 PM)  Beti Score: 10 (1/17/2025  4:15 PM)

## 2025-01-17 NOTE — H&P
Hernando Uribe MD  Anesthesiologist  Specialty: Anesthesiology     Anesthesia Preprocedure Evaluation      Addendum     Creation Time: 1/17/2025 12:12 AM   suggestion  Primary                                                                                                                       01/17/2025  Kevin Ca Jr. is a 54 y.o., male.                 Results for orders placed or performed during the hospital encounter of 08/15/24   EKG 12-lead     Collection Time: 08/15/24  3:45 PM   Result Value Ref Range     QRS Duration 102 ms     OHS QTC Calculation 408 ms     Narrative     Test Reason : Z01.818,     Vent. Rate : 066 BPM     Atrial Rate : 066 BPM     P-R Int : 144 ms          QRS Dur : 102 ms      QT Int : 390 ms       P-R-T Axes : 060 087 030 degrees     QTc Int : 408 ms     Normal sinus rhythm  Normal ECG  When compared with ECG of 12-JUL-2019 09:23,  No significant change was found  Confirmed by Bk Helm MD ([id number]) on 8/18/2024 3:17:37 PM     Referred By:             Confirmed By:Bk Helm MD                     Lab Results   Component Value Date     WBC 5.20 01/15/2025     HGB 13.2 (L) 01/15/2025     HCT 41.4 01/15/2025     MCV 89 01/15/2025      01/15/2025      BMP        Lab Results   Component Value Date      01/15/2025     K 4.8 01/15/2025      01/15/2025     CO2 29 01/15/2025     BUN 10 01/15/2025     CREATININE 0.9 01/15/2025     CALCIUM 9.5 01/15/2025     ANIONGAP 5 (L) 01/15/2025      01/15/2025     GLU 94 08/15/2024     GLU 95 05/01/2024         No results found for this or any previous visit.                    Pre-op Assessment     I have reviewed the Patient Summary Reports.     I have reviewed the Nursing Notes. I have reviewed the NPO Status.   I have reviewed the Medications.      Review of Systems  Anesthesia Hx:  No problems with previous Anesthesia             Denies Family Hx of Anesthesia complications.    Denies Personal Hx of Anesthesia  complications.                    Social:  Non-Smoker, No Alcohol Use       Hematology/Oncology:  Hematology Normal   Oncology Normal                                   EENT/Dental:  EENT/Dental Normal           Cardiovascular:  Cardiovascular Normal                  ECG has been reviewed.                            Pulmonary:  Pulmonary Normal                       Hepatic/GI:     GERD, well controlled                Musculoskeletal:  Arthritis   Hallux interphalangeus, acquired, right  Hallux valgus of right foot  Metatarsus adductus of both feet            Spine Disorders: lumbar Chronic Pain           Neurological:  Neurology Normal                                      Endocrine:     A1C slightly elevated      Obesity / BMI > 30  Dermatological:  Skin Normal    Psych:  Psychiatric Normal                       Physical Exam  General: Well nourished, Cooperative, Alert and Oriented     Airway:  Mallampati: III / II  Mouth Opening: Normal  TM Distance: > 6 cm  Tongue: Normal  Neck ROM: Normal ROM     Dental:  Intact     Chest/Lungs:  Clear to auscultation, Normal Respiratory Rate     Heart:  Rate: Normal  Rhythm: Regular Rhythm  Sounds: Normal     Abdomen:  Normal, Soft, Nontender      Diagnosis:      Hallux valgus of left foot [M20.12]      Metatarsus adductus of left foot [Q66.222]      Valgus deformity of left great toe [M20.12]      Hammer toe of left foot [M20.42]      Painful orthopaedic hardware [T84.84XA]      Left foot pain [M79.672]       Procedures:      BUNIONECTOMY, LAPIDUS (Left: Foot)      RECONSTRUCTION, CHARCOT FOOT, WITH FUSION (Left: Foot)      CORRECTION, HAMMER TOE (Left: Foot) - With tendon and capsule balancing      OSTEOTOMY, FOOT (Left: Foot)      REMOVAL, HARDWARE, FOOT (Right: Foot)

## 2025-01-17 NOTE — DISCHARGE INSTRUCTIONS
FOLLOW THE WRITTEN INSTRUCTIONS I REVIEWED WITH YOU AND A COPY GIVEN TO YOU.  NO WEIGHTBEARING ON THE LEFT FOOT UNTIL RELEASED BY THE DOCTOR. FOLLOW THE VERBAL INSTRUCTIONS DR COLLINS GAVE YOU  PRIOR TO SURGERY, REGARDING WEIGHTBEARING ON YOUR RIGHT FOOT.   DO NOT DRIVE, SIGN LEGAL DOCUMENTS OR SHOWER FOR THE NEXT 24 HOURS.  KEEP YOUR DRESSING ON THE R/L FOOT CLEAN AND DRY.  CALL THE DOCTOR IF YOU DEVELOP FEVER GREATER THAN 100.4, FOUL ODOR COMING FROM SURGICAL SITE OR DRAINAGE.  NO TUB BATHS OR SWIMMING UNTIL RELEASED BY THE DOCTOR.

## 2025-01-24 ENCOUNTER — OFFICE VISIT (OUTPATIENT)
Dept: PODIATRY | Facility: CLINIC | Age: 55
End: 2025-01-24
Payer: COMMERCIAL

## 2025-01-24 VITALS — BODY MASS INDEX: 33.93 KG/M2 | HEIGHT: 70 IN | WEIGHT: 237 LBS | RESPIRATION RATE: 18 BRPM

## 2025-01-24 DIAGNOSIS — M20.12 HALLUX VALGUS OF LEFT FOOT: ICD-10-CM

## 2025-01-24 DIAGNOSIS — T84.84XA PAINFUL ORTHOPAEDIC HARDWARE: ICD-10-CM

## 2025-01-24 DIAGNOSIS — Z98.890 S/P BUNIONECTOMY: Primary | ICD-10-CM

## 2025-01-24 DIAGNOSIS — Z98.890 S/P HARDWARE REMOVAL: ICD-10-CM

## 2025-01-24 DIAGNOSIS — Z98.890 POST-OPERATIVE STATE: ICD-10-CM

## 2025-01-24 DIAGNOSIS — Q66.222 METATARSUS ADDUCTUS OF LEFT FOOT: ICD-10-CM

## 2025-01-24 PROCEDURE — 99024 POSTOP FOLLOW-UP VISIT: CPT | Mod: S$GLB,,,

## 2025-01-24 PROCEDURE — 1159F MED LIST DOCD IN RCRD: CPT | Mod: CPTII,S$GLB,,

## 2025-01-24 PROCEDURE — 1160F RVW MEDS BY RX/DR IN RCRD: CPT | Mod: CPTII,S$GLB,,

## 2025-01-24 PROCEDURE — 99999 PR PBB SHADOW E&M-EST. PATIENT-LVL III: CPT | Mod: PBBFAC,,,

## 2025-01-24 RX ORDER — OXYCODONE AND ACETAMINOPHEN 10; 325 MG/1; MG/1
1 TABLET ORAL EVERY 6 HOURS PRN
Qty: 27 TABLET | Refills: 0 | Status: SHIPPED | OUTPATIENT
Start: 2025-01-24 | End: 2025-02-06 | Stop reason: SDUPTHER

## 2025-01-24 RX ORDER — DOXYCYCLINE 100 MG/1
100 CAPSULE ORAL 2 TIMES DAILY
Qty: 28 CAPSULE | Refills: 0 | Status: SHIPPED | OUTPATIENT
Start: 2025-01-24 | End: 2025-02-06 | Stop reason: SDUPTHER

## 2025-01-24 NOTE — PROGRESS NOTES
"    [address] [facility] [address] [place] [address]  Phone: [phone]   [phone]    Patient's PCP:Elliott Robert MD  Referring Provider:No ref. provider found    Subjective:      Chief Complaint: Post-op Evaluation (BUNIONECTOMY, LAPIDUS (Left)/Resection of 1st metartarsal head medial eminence (Left) /1st MTPJ Capsule tendon balancing (Left) /2nd and 3rd metarsal angular correction with osteotomy and fusion of 2nd and 3rd TMTJ (Left) /Akin Osteotomy 1st proximal phalanx (Left) /OSTEOTOMY, FOOT (Left)/REMOVAL, HARDWARE, FOOT (Right) )      Date of Surgery: 1/17/2025  Procedure: BUNIONECTOMY, LAPIDUS (Left)  Resection of 1st metartarsal head medial eminence (Left)   1st MTPJ Capsule tendon balancing (Left)   2nd and 3rd metarsal angular correction with osteotomy and fusion of 2nd and 3rd TMTJ (Left)   Akin Osteotomy 1st proximal phalanx (Left)   OSTEOTOMY, FOOT (Left)  REMOVAL, HARDWARE, FOOT (Right)     HPI:   Kevin Ca Jr. is a 54 y.o. male who returns to the clinic today for his post-operative visit. Kevin Ca Jr. rates pain a 6/10 on a pain scale. Compliance of Care:  non weightbearing in cam walker boot cast left foot, surgical shoe to left foot dressing intact. Using knee scooter to ambulate. Notes increased pain when hanging or lowering left foot. Denies any recent fever, chills, nausea, vomiting, or SOB. Taking medications as instructed.     Vitals:    01/24/25 1013   Resp: 18   Weight: 107.5 kg (236 lb 15.9 oz)   Height: 5' 9.5" (1.765 m)   PainSc:   6        Past Surgical History:   Procedure Laterality Date    ARTHROSCOPY OF KNEE Right 07/16/2019    Procedure: ARTHROSCOPY, KNEE;  Surgeon: Ganesh Berry MD;  Location: [facility] OR;  Service: Orthopedics;  Laterality: Right;    BUNIONECTOMY Right 08/16/2024    Procedure: BUNIONECTOMY;  Surgeon: Bebeto Gonzalez DPM;  Location: [facility] OR;  Service: Podiatry;  Laterality: Right;  Pineda notified jg 8/15    CORRECTION OF HAMMER TOE Left " 1/17/2025    Procedure: CORRECTION, HAMMER TOE;  Surgeon: Bebeto Gonzalez DPM;  Location: [facility] OR;  Service: Podiatry;  Laterality: Left;  With tendon and capsule balancing    FOOT HARDWARE REMOVAL Right 1/17/2025    Procedure: REMOVAL, HARDWARE, FOOT;  Surgeon: Bebeto Gonzalez DPM;  Location: [facility] OR;  Service: Podiatry;  Laterality: Right;    FOOT OSTEOTOMY Left 1/17/2025    Procedure: OSTEOTOMY, FOOT;  Surgeon: Bebeto Gonzalez DPM;  Location: [facility] OR;  Service: Podiatry;  Laterality: Left;    HAND TENDON SURGERY Left 1992    HERNIA REPAIR Right 04/12/2017    with mesh    LAPIDUS BUNIONECTOMY Left 1/17/2025    Procedure: BUNIONECTOMY, LAPIDUS;  Surgeon: Bebeto Gonzalez DPM;  Location: [facility] OR;  Service: Podiatry;  Laterality: Left;    MIDFOOT ARTHRODESIS Right 08/16/2024    Procedure: FUSION, JOINT, MIDFOOT;  Surgeon: Bebeto Gonzalez DPM;  Location: [facility] OR;  Service: Podiatry;  Laterality: Right;    RECONSTRUCTION WITH FUSION OF CHARCOT FOOT Left 1/17/2025    Procedure: RECONSTRUCTION, CHARCOT FOOT, WITH FUSION;  Surgeon: Bebeto Gonzalez DPM;  Location: [facility] OR;  Service: Podiatry;  Laterality: Left;    REPAIR OF MENISCUS OF KNEE Right 07/16/2019    Procedure: REPAIR, MENISCUS, KNEE;  Surgeon: Ganesh Berry MD;  Location: [facility];  Service: Orthopedics;  Laterality: Right;    right foot fracture  2005     Past Medical History:   Diagnosis Date    Arthritis     Back pain     GERD (gastroesophageal reflux disease)      Family History   Problem Relation Name Age of Onset    Cancer Maternal Grandfather          Social History:   Marital Status:   Alcohol History:  reports no history of alcohol use.  Tobacco History:  reports that he has never smoked. He has never used smokeless tobacco.  Drug History:  reports no history of drug use.    Review of patient's allergies indicates:  No Known Allergies    Current Outpatient Medications   Medication Sig Dispense Refill    multivitamin (ONE DAILY  MULTIVITAMIN) per tablet Take 1 tablet by mouth once daily.      naproxen sodium (ANAPROX) 220 MG tablet Take 220 mg by mouth every 12 (twelve) hours.      omega-3 fatty acids/fish oil (FISH OIL-OMEGA-3 FATTY ACIDS) 300-1,000 mg capsule Take 1 capsule by mouth once daily.      omeprazole (PRILOSEC) 40 MG capsule Take 1 capsule (40 mg total) by mouth once daily. 90 capsule 3    oxyCODONE-acetaminophen (PERCOCET)  mg per tablet Take 1 tablet by mouth every 6 (six) hours as needed for Pain. 27 tablet 0    pregabalin (LYRICA) 50 MG capsule Take 2 capsules (100 mg total) by mouth 2 (two) times daily. 60 capsule 6     No current facility-administered medications for this visit.       Review of Systems      Objective:      Foot Exam:   Post-op surgery of the left foot bunion and midfoot reconstruction  with normal healing, no signs of infection or dehiscence of wound. Hardware in place. Normal post op exam today. No redness, no drainage, no increase in local temperature, no significant swelling, sutures are intact.     Imaging:    XR Results:  Results for orders placed during the hospital encounter of 01/17/25    X-Ray Foot Complete Bilateral    Narrative  EXAMINATION:  XR FOOT COMPLETE 3 VIEW BILATERAL    CLINICAL HISTORY:  post op;  Hallux valgus (acquired), left foot    FINDINGS:  Two views left foot were performed with in a boot.  The patient is status post surgery in fusion of the 1st 2nd and 3rd tarsal metatarsal joints with cortical plate and fixating screws.  There is a stable within the base of the proximal phalanx of the 1st digit.  The hardware is intact.  There are no fractures or acute osseous abnormalities.  There is good anatomic alignment.    Impression  Postsurgical changes of the 1st 2nd and 3rd tarsal metatarsal joints without hardware complication    Postsurgical changes in the proximal phalanx of the 1st digit without acute osseous abnormality      Electronically signed by: Faith  Vinnyshantell  Date:    01/17/2025  Time:    14:47           Assessment:       1. S/P bunionectomy    2. Post-operative state    3. Hallux valgus of left foot    4. Metatarsus adductus of left foot    5. S/P hardware removal    6. Painful orthopaedic hardware      Plan:   S/P bunionectomy    Post-operative state  -     doxycycline (VIBRAMYCIN) 100 MG Cap; Take 1 capsule (100 mg total) by mouth 2 (two) times daily. for 14 days  Dispense: 28 capsule; Refill: 0  -     oxyCODONE-acetaminophen (PERCOCET)  mg per tablet; Take 1 tablet by mouth every 6 (six) hours as needed for Pain.  Dispense: 27 tablet; Refill: 0    Hallux valgus of left foot    Metatarsus adductus of left foot    Painful orthopaedic hardware    S/P hardware removal  - Right Akin staple     Review intraopertive and post surgical imaging with patient   The surgical dressing was removed showing no signs of infection, excess edema or malalignment.   NWB LLE, knee scooter assist   A new dry compressive dressing was applied and patient was instructed to leave dressing intact, clean, and dry until next visit or until instructed to remove.   CAM Boot, treat like cast  Pain medication as directed  Rest, Ice, Elevate above heart, Compression   Abx as directed   Follow up 2 weeks for imaging and suture removal   This note was created using Dragon voice recognition software that occasionally misinterpreted phrases or words.    Bebeto Gonzalez DPM  Podiatry / Foot and Ankle Surgery   Secure chat preferred

## 2025-01-24 NOTE — LETTER
January 24, 2025      [facility] - Podiatry  [address] [facility]  [address]  [facility] [address]  Phone: [phone]  Fax: [phone]       Patient: Kevin Ca   YOB: 1970  Date of Visit: 01/24/2025    To Whom It May Concern:    Maddie Ca  was at [facility] on 01/24/2025. The patient may return to work/school on 05/01/2025 with no restrictions. If you have any questions or concerns, or if I can be of further assistance, please do not hesitate to contact me.    Sincerely,

## 2025-02-06 DIAGNOSIS — M20.12 HALLUX VALGUS OF LEFT FOOT: ICD-10-CM

## 2025-02-06 DIAGNOSIS — K21.9 GASTROESOPHAGEAL REFLUX DISEASE, UNSPECIFIED WHETHER ESOPHAGITIS PRESENT: ICD-10-CM

## 2025-02-06 DIAGNOSIS — T84.84XA PAINFUL ORTHOPAEDIC HARDWARE: ICD-10-CM

## 2025-02-06 DIAGNOSIS — Q66.222 METATARSUS ADDUCTUS OF LEFT FOOT: ICD-10-CM

## 2025-02-06 RX ORDER — OMEPRAZOLE 40 MG/1
40 CAPSULE, DELAYED RELEASE ORAL DAILY
Qty: 90 CAPSULE | Refills: 3 | Status: CANCELLED | OUTPATIENT
Start: 2025-02-06 | End: 2026-02-06

## 2025-02-07 NOTE — TELEPHONE ENCOUNTER
----- Message from Chapin Costello sent at 2/7/2025 10:15 AM CST -----  Regarding: Rx's  Patient left message about his refill of Rx's??? Please advise

## 2025-02-07 NOTE — TELEPHONE ENCOUNTER
----- Message from Chapin Costello sent at 2/7/2025  4:11 PM CST -----  Regarding: Rx's  Patient called again about his Rx's  He will be out of them on Sunday  Please advise

## 2025-02-09 RX ORDER — OXYCODONE AND ACETAMINOPHEN 10; 325 MG/1; MG/1
1 TABLET ORAL EVERY 6 HOURS PRN
Qty: 27 TABLET | Refills: 0 | Status: SHIPPED | OUTPATIENT
Start: 2025-02-09

## 2025-02-09 RX ORDER — DOXYCYCLINE 100 MG/1
100 CAPSULE ORAL 2 TIMES DAILY
Qty: 28 CAPSULE | Refills: 0 | Status: SHIPPED | OUTPATIENT
Start: 2025-02-09 | End: 2025-02-23

## 2025-02-12 ENCOUNTER — OFFICE VISIT (OUTPATIENT)
Dept: PODIATRY | Facility: CLINIC | Age: 55
End: 2025-02-12
Payer: COMMERCIAL

## 2025-02-12 ENCOUNTER — HOSPITAL ENCOUNTER (OUTPATIENT)
Dept: RADIOLOGY | Facility: CLINIC | Age: 55
Discharge: HOME | End: 2025-02-12
Payer: COMMERCIAL

## 2025-02-12 VITALS — HEIGHT: 69 IN | BODY MASS INDEX: 35.1 KG/M2 | WEIGHT: 237 LBS

## 2025-02-12 DIAGNOSIS — Q66.222 METATARSUS ADDUCTUS OF BOTH FEET: Primary | ICD-10-CM

## 2025-02-12 DIAGNOSIS — M20.12 HALLUX VALGUS OF LEFT FOOT: ICD-10-CM

## 2025-02-12 DIAGNOSIS — T84.84XA PAINFUL ORTHOPAEDIC HARDWARE: ICD-10-CM

## 2025-02-12 DIAGNOSIS — Q66.222 METATARSUS ADDUCTUS OF LEFT FOOT: ICD-10-CM

## 2025-02-12 DIAGNOSIS — Q66.221 METATARSUS ADDUCTUS OF BOTH FEET: Primary | ICD-10-CM

## 2025-02-12 DIAGNOSIS — Z98.890 POST-OPERATIVE STATE: ICD-10-CM

## 2025-02-12 PROCEDURE — 1160F RVW MEDS BY RX/DR IN RCRD: CPT | Mod: CPTII,S$GLB,,

## 2025-02-12 PROCEDURE — 99024 POSTOP FOLLOW-UP VISIT: CPT | Mod: S$GLB,,,

## 2025-02-12 PROCEDURE — 73630 X-RAY EXAM OF FOOT: CPT | Mod: S$GLB,,, | Performed by: RADIOLOGY

## 2025-02-12 PROCEDURE — 99999 PR PBB SHADOW E&M-EST. PATIENT-LVL III: CPT | Mod: PBBFAC,,,

## 2025-02-12 PROCEDURE — 1159F MED LIST DOCD IN RCRD: CPT | Mod: CPTII,S$GLB,,

## 2025-02-12 NOTE — LETTER
February 12, 2025      [facility] - Podiatry  [address] [facility] [address]  [address]  [facility] [address]  Phone: [phone]  Fax: [phone]       Patient: Kevin Ca   YOB: 1970  Date of Visit: 02/12/2025    To Whom It May Concern:    Maddie Ca  was at [facility] on 02/12/2025. The patient may return to work on 6/1/2025. If you have any questions or concerns, or if I can be of further assistance, please do not hesitate to contact me.    Sincerely,

## 2025-02-12 NOTE — PROGRESS NOTES
"    [address] [facility] [address] [place] [address]  Phone: [phone]   [phone]    Patient's PCP:Elliott Robert MD  Referring Provider:No ref. provider found    Subjective:      Chief Complaint: Post-op Evaluation (Date of Surgery: 1/17/2025/Procedure: BUNIONECTOMY, LAPIDUS (Left)/Resection of 1st metartarsal head medial eminence (Left) /1st MTPJ Capsule tendon balancing (Left) /2nd and 3rd metarsal angular correction with osteotomy and fusion of 2nd and 3rd TMTJ (Left) /Akin Osteotomy 1st proximal phalanx (Left) /OSTEOTOMY, FOOT (Left)/REMOVAL, HARDWARE, FOOT (Right) /)      Date of Surgery: 1/17/2025  Procedure: BUNIONECTOMY, LAPIDUS (Left)  Resection of 1st metartarsal head medial eminence (Left)   1st MTPJ Capsule tendon balancing (Left)   2nd and 3rd metarsal angular correction with osteotomy and fusion of 2nd and 3rd TMTJ (Left)   Akin Osteotomy 1st proximal phalanx (Left)   OSTEOTOMY, FOOT (Left)  REMOVAL, HARDWARE, FOOT (Right)     HPI:   Kevin Ca Jr. is a 54 y.o. male who returns to the clinic today for his post-operative visit. Kevin Ca Jr. rates pain a 6/10 on a pain scale. Compliance of Care:  non weightbearing in cam walker boot cast left foot, surgical shoe to left foot dressing intact. Using knee scooter to ambulate.Denies any recent fever, chills, nausea, vomiting, or SOB. Taking medications as instructed. No new complaints.     Vitals:    02/12/25 0916   Weight: 107.5 kg (236 lb 15.9 oz)   Height: 5' 9" (1.753 m)   PainSc:   6        Past Surgical History:   Procedure Laterality Date    ARTHROSCOPY OF KNEE Right 07/16/2019    Procedure: ARTHROSCOPY, KNEE;  Surgeon: Ganesh Berry MD;  Location: [facility] OR;  Service: Orthopedics;  Laterality: Right;    BUNIONECTOMY Right 08/16/2024    Procedure: BUNIONECTOMY;  Surgeon: Bebeto Gonzalez DPM;  Location: [facility] OR;  Service: Podiatry;  Laterality: Right;  Pineda notified j 8/15    CORRECTION OF HAMMER TOE Left 1/17/2025    " Procedure: CORRECTION, HAMMER TOE;  Surgeon: Bebeto Gonzalez DPM;  Location: [facility] OR;  Service: Podiatry;  Laterality: Left;  With tendon and capsule balancing    FOOT HARDWARE REMOVAL Right 1/17/2025    Procedure: REMOVAL, HARDWARE, FOOT;  Surgeon: Bebeto Gonzalez DPM;  Location: [facility] OR;  Service: Podiatry;  Laterality: Right;    FOOT OSTEOTOMY Left 1/17/2025    Procedure: OSTEOTOMY, FOOT;  Surgeon: Bebeto Gonzalez DPM;  Location: [facility] OR;  Service: Podiatry;  Laterality: Left;    HAND TENDON SURGERY Left 1992    HERNIA REPAIR Right 04/12/2017    with mesh    LAPIDUS BUNIONECTOMY Left 1/17/2025    Procedure: BUNIONECTOMY, LAPIDUS;  Surgeon: Bebeto Gonzalez DPM;  Location: [facility] OR;  Service: Podiatry;  Laterality: Left;    MIDFOOT ARTHRODESIS Right 08/16/2024    Procedure: FUSION, JOINT, MIDFOOT;  Surgeon: Bebeto Gonzalez DPM;  Location: [facility] OR;  Service: Podiatry;  Laterality: Right;    RECONSTRUCTION WITH FUSION OF CHARCOT FOOT Left 1/17/2025    Procedure: RECONSTRUCTION, CHARCOT FOOT, WITH FUSION;  Surgeon: Bebeto Gonzalez DPM;  Location: [facility] OR;  Service: Podiatry;  Laterality: Left;    REPAIR OF MENISCUS OF KNEE Right 07/16/2019    Procedure: REPAIR, MENISCUS, KNEE;  Surgeon: Ganesh Berry MD;  Location: [facility] OR;  Service: Orthopedics;  Laterality: Right;    right foot fracture  2005     Past Medical History:   Diagnosis Date    Arthritis     Back pain     GERD (gastroesophageal reflux disease)      Family History   Problem Relation Name Age of Onset    Cancer Maternal Grandfather          Social History:   Marital Status:   Alcohol History:  reports no history of alcohol use.  Tobacco History:  reports that he has never smoked. He has never used smokeless tobacco.  Drug History:  reports no history of drug use.    Review of patient's allergies indicates:  No Known Allergies    Current Outpatient Medications   Medication Sig Dispense Refill    multivitamin (ONE DAILY MULTIVITAMIN) per tablet  Take 1 tablet by mouth once daily.      naproxen sodium (ANAPROX) 220 MG tablet Take 220 mg by mouth every 12 (twelve) hours.      omega-3 fatty acids/fish oil (FISH OIL-OMEGA-3 FATTY ACIDS) 300-1,000 mg capsule Take 1 capsule by mouth once daily.      omeprazole (PRILOSEC) 40 MG capsule Take 1 capsule (40 mg total) by mouth once daily. 90 capsule 3    pregabalin (LYRICA) 50 MG capsule Take 2 capsules (100 mg total) by mouth 2 (two) times daily. 60 capsule 6    oxyCODONE-acetaminophen (PERCOCET)  mg per tablet Take 1 tablet by mouth every 6 (six) hours as needed for Pain. 27 tablet 0     No current facility-administered medications for this visit.       Review of Systems   Constitutional:  Negative for chills, fatigue, fever and unexpected weight change.   HENT:  Negative for hearing loss and trouble swallowing.    Eyes:  Negative for photophobia and visual disturbance.   Respiratory:  Negative for cough, shortness of breath and wheezing.    Cardiovascular:  Negative for chest pain, palpitations and leg swelling.   Gastrointestinal:  Negative for abdominal pain and nausea.   Genitourinary:  Negative for dysuria and frequency.   Musculoskeletal:  Negative for arthralgias, back pain, gait problem, joint swelling, myalgias and neck pain.   Skin:  Negative for rash and wound.   Neurological:  Negative for tremors, seizures, weakness, numbness and headaches.   Hematological:  Does not bruise/bleed easily.         Objective:      Foot Exam:   Post-op surgery of the left foot bunion and midfoot reconstruction and right foot hardware removal with normal healing, no signs of infection or dehiscence of wound. Hardware in place. Normal post op exam today. No redness, no drainage, no increase in local temperature, no significant swelling, sutures are intact.     Imaging:    Narrative & Impression  EXAMINATION:  XR FOOT COMPLETE 3 VIEW BILATERAL     CLINICAL HISTORY:  Congenital metatarsus adductus, right foot      TECHNIQUE:  AP, lateral, and oblique views of both feet were performed.     COMPARISON:  Right foot x-rays of November 13, 2024, bilateral foot x-rays of August 6, 2024     FINDINGS:  On the right the patient has had osteotomy of the proximal phalanx of the left big toe and fixation of the 1st 2nd and 3rd metatarsal cuneiform joints with cortical plates and screws in place.  There is straightening of the 1st digit.  On the bright the patient has had fixation of the 1st 2nd and 3rd metatarsal cuneiform joints.  There is straightening of this 1st digit.  There is a healing fracture of the midshaft of the 2nd metatarsal without angulation.     Impression:     Status post fixation of the 1st 2nd and 3rd metatarsal cuneiform joints on both sides with extensive hardware in position.  Prior osteotomy of the proximal phalanx of the left big toe and proximal phalanx of the right big toe.  Healing midshaft fracture of the right 2nd metatarsal without angulation.  There is straightening of the 1st digit on both sides since the prior study of August 6, 2024        Electronically signed by:Oneil Ann MD  Date:                                            02/12/2025  Time:                                           09:45           Assessment:       1. Metatarsus adductus of both feet    2. Post-operative state    3. Metatarsus adductus of left foot    4. Painful orthopaedic hardware    5. Hallux valgus of left foot      Plan:   S/P bunionectomy    Post-operative state    Hallux valgus of left foot    Metatarsus adductus of left foot    Painful orthopaedic hardware    S/P hardware removal  - Right Akin staple     Personally reviewed X-ray left Foot, Three Views, and radiologists report. Discussed findings with patient including no signs of hardware loosening or failure,and no lost of correction or alignment. Early signs of healing appreciated across fusion sites.   Sutures removed from all incision sites except the hardware  removal site on the right great toe, patient will follow up next week for removal, does not appear fully coapted yet. Tolerated well, no incident.   The surgical dressing was removed showing no signs of infection, excess edema or malalignment.   NWB LLE, knee scooter assist   A new dry compressive dressing was applied and patient was instructed to leave dressing intact, clean, and dry until next visit or until instructed to remove.   CAM Boot, treat like cast  Pain medication as directed  Rest, Ice, Elevate above heart, Compression   Abx as directed   Follow up 1 weeks for imaging and suture removal   This note was created using Dragon voice recognition software that occasionally misinterpreted phrases or words.    Bebeto Gonzalez DPM  Podiatry / Foot and Ankle Surgery   Secure chat preferred  Foot Exam  Physical Exam  Procedures   no

## 2025-02-18 ENCOUNTER — OFFICE VISIT (OUTPATIENT)
Dept: PODIATRY | Facility: CLINIC | Age: 55
End: 2025-02-18
Payer: COMMERCIAL

## 2025-02-18 VITALS — HEIGHT: 69 IN | RESPIRATION RATE: 16 BRPM | BODY MASS INDEX: 35.1 KG/M2 | WEIGHT: 237 LBS

## 2025-02-18 DIAGNOSIS — Q66.221 METATARSUS ADDUCTUS OF BOTH FEET: ICD-10-CM

## 2025-02-18 DIAGNOSIS — Q66.222 METATARSUS ADDUCTUS OF LEFT FOOT: ICD-10-CM

## 2025-02-18 DIAGNOSIS — Z98.890 POST-OPERATIVE STATE: Primary | ICD-10-CM

## 2025-02-18 DIAGNOSIS — T84.84XA PAINFUL ORTHOPAEDIC HARDWARE: ICD-10-CM

## 2025-02-18 DIAGNOSIS — M20.12 HALLUX VALGUS OF LEFT FOOT: ICD-10-CM

## 2025-02-18 DIAGNOSIS — M79.672 LEFT FOOT PAIN: ICD-10-CM

## 2025-02-18 DIAGNOSIS — Z98.890 S/P BUNIONECTOMY: ICD-10-CM

## 2025-02-18 DIAGNOSIS — Q66.222 METATARSUS ADDUCTUS OF BOTH FEET: ICD-10-CM

## 2025-02-18 PROCEDURE — 99024 POSTOP FOLLOW-UP VISIT: CPT | Mod: S$GLB,,,

## 2025-02-18 PROCEDURE — 1159F MED LIST DOCD IN RCRD: CPT | Mod: CPTII,S$GLB,,

## 2025-02-18 PROCEDURE — 1160F RVW MEDS BY RX/DR IN RCRD: CPT | Mod: CPTII,S$GLB,,

## 2025-02-18 PROCEDURE — 99999 PR PBB SHADOW E&M-EST. PATIENT-LVL III: CPT | Mod: PBBFAC,,,

## 2025-02-18 NOTE — PROGRESS NOTES
"    [address] [facility] [address] [place] [address]  Phone: [phone]   [phone]    Patient's PCP:Elliott Robert MD  Referring Provider:No ref. provider found    Subjective:      Chief Complaint: Post-op Evaluation (BUNIONECTOMY, LAPIDUS (Left)/Resection of 1st metartarsal head medial eminence (Left) /1st MTPJ Capsule tendon balancing (Left) /2nd and 3rd metarsal angular correction with osteotomy and fusion of 2nd and 3rd TMTJ (Left) /Akin Osteotomy 1st proximal phalanx (Left) /OSTEOTOMY, FOOT (Left)/REMOVAL, HARDWARE, FOOT (Right) /)      Date of Surgery: 1/17/2025  Procedure: BUNIONECTOMY, LAPIDUS (Left)  Resection of 1st metartarsal head medial eminence (Left)   1st MTPJ Capsule tendon balancing (Left)   2nd and 3rd metarsal angular correction with osteotomy and fusion of 2nd and 3rd TMTJ (Left)   Akin Osteotomy 1st proximal phalanx (Left)   OSTEOTOMY, FOOT (Left)  REMOVAL, HARDWARE, FOOT (Right)     HPI:   Kevin Ca Jr. is a 54 y.o. male who returns to the clinic today for his post-operative visit. Kevin Ca Jr. rates pain a 6/10 on a pain scale. Compliance of Care:  non weightbearing in cam walker boot cast left foot, surgical shoe to left foot dressing intact. Using knee scooter to ambulate.Denies any recent fever, chills, nausea, vomiting, or SOB. Taking medications as instructed. No new complaints.     Vitals:    02/18/25 0849   Resp: 16   Weight: 107.5 kg (236 lb 15.9 oz)   Height: 5' 9" (1.753 m)   PainSc:   6        Past Surgical History:   Procedure Laterality Date    ARTHROSCOPY OF KNEE Right 07/16/2019    Procedure: ARTHROSCOPY, KNEE;  Surgeon: Ganesh Berry MD;  Location: [facility] OR;  Service: Orthopedics;  Laterality: Right;    BUNIONECTOMY Right 08/16/2024    Procedure: BUNIONECTOMY;  Surgeon: Bebeto Gonzalez DPM;  Location: [facility] OR;  Service: Podiatry;  Laterality: Right;  [employer] notified  8/15    CORRECTION OF HAMMER TOE Left 1/17/2025    Procedure: CORRECTION, " HAMMER TOE;  Surgeon: Bebeto Gonzalez DPM;  Location: [facility] OR;  Service: Podiatry;  Laterality: Left;  With tendon and capsule balancing    FOOT HARDWARE REMOVAL Right 1/17/2025    Procedure: REMOVAL, HARDWARE, FOOT;  Surgeon: Bebeto Gonzalez DPM;  Location: [facility] OR;  Service: Podiatry;  Laterality: Right;    FOOT OSTEOTOMY Left 1/17/2025    Procedure: OSTEOTOMY, FOOT;  Surgeon: Bebeto Gonzalez DPM;  Location: [facility] OR;  Service: Podiatry;  Laterality: Left;    HAND TENDON SURGERY Left 1992    HERNIA REPAIR Right 04/12/2017    with mesh    LAPIDUS BUNIONECTOMY Left 1/17/2025    Procedure: BUNIONECTOMY, LAPIDUS;  Surgeon: Bebeto Gonzalez DPM;  Location: [facility] OR;  Service: Podiatry;  Laterality: Left;    MIDFOOT ARTHRODESIS Right 08/16/2024    Procedure: FUSION, JOINT, MIDFOOT;  Surgeon: Bebeto Gonzalez DPM;  Location: [facility] OR;  Service: Podiatry;  Laterality: Right;    RECONSTRUCTION WITH FUSION OF CHARCOT FOOT Left 1/17/2025    Procedure: RECONSTRUCTION, CHARCOT FOOT, WITH FUSION;  Surgeon: Bebeto Gonzalez DPM;  Location: [facility] OR;  Service: Podiatry;  Laterality: Left;    REPAIR OF MENISCUS OF KNEE Right 07/16/2019    Procedure: REPAIR, MENISCUS, KNEE;  Surgeon: Ganesh Berry MD;  Location: [facility];  Service: Orthopedics;  Laterality: Right;    right foot fracture  2005     Past Medical History:   Diagnosis Date    Arthritis     Back pain     GERD (gastroesophageal reflux disease)      Family History   Problem Relation Name Age of Onset    Cancer Maternal Grandfather          Social History:   Marital Status:   Alcohol History:  reports no history of alcohol use.  Tobacco History:  reports that he has never smoked. He has never used smokeless tobacco.  Drug History:  reports no history of drug use.    Review of patient's allergies indicates:  No Known Allergies    Current Outpatient Medications   Medication Sig Dispense Refill    omega-3 fatty acids/fish oil (FISH OIL-OMEGA-3 FATTY ACIDS) 300-1,000 mg  capsule Take 1 capsule by mouth once daily.      omeprazole (PRILOSEC) 40 MG capsule Take 1 capsule (40 mg total) by mouth once daily. 90 capsule 3    oxyCODONE-acetaminophen (PERCOCET)  mg per tablet Take 1 tablet by mouth every 6 (six) hours as needed for Pain. 27 tablet 0    pregabalin (LYRICA) 50 MG capsule Take 2 capsules (100 mg total) by mouth 2 (two) times daily. 60 capsule 6     No current facility-administered medications for this visit.       Review of Systems   Constitutional:  Negative for chills, fatigue, fever and unexpected weight change.   HENT:  Negative for hearing loss and trouble swallowing.    Eyes:  Negative for photophobia and visual disturbance.   Respiratory:  Negative for cough, shortness of breath and wheezing.    Cardiovascular:  Negative for chest pain, palpitations and leg swelling.   Gastrointestinal:  Negative for abdominal pain and nausea.   Genitourinary:  Negative for dysuria and frequency.   Musculoskeletal:  Negative for arthralgias, back pain, gait problem, joint swelling, myalgias and neck pain.   Skin:  Negative for rash and wound.   Neurological:  Negative for tremors, seizures, weakness, numbness and headaches.   Hematological:  Does not bruise/bleed easily.         Objective:      Foot Exam:   Post-op surgery of the left foot bunion and midfoot reconstruction and right foot hardware removal with normal healing, no signs of infection or dehiscence of wound. Hardware in place. Normal post op exam today. No redness, no drainage, no increase in local temperature, no significant swelling, sutures are intact to right toe, left foot sutures removed last visit, mild scab over 1st ray, other sites well approximated and coapted.     Imaging:    Narrative & Impression  EXAMINATION:  XR FOOT COMPLETE 3 VIEW BILATERAL     CLINICAL HISTORY:  Congenital metatarsus adductus, right foot     TECHNIQUE:  AP, lateral, and oblique views of both feet were performed.     COMPARISON:  Right  foot x-rays of November 13, 2024, bilateral foot x-rays of August 6, 2024     FINDINGS:  On the right the patient has had osteotomy of the proximal phalanx of the left big toe and fixation of the 1st 2nd and 3rd metatarsal cuneiform joints with cortical plates and screws in place.  There is straightening of the 1st digit.  On the bright the patient has had fixation of the 1st 2nd and 3rd metatarsal cuneiform joints.  There is straightening of this 1st digit.  There is a healing fracture of the midshaft of the 2nd metatarsal without angulation.     Impression:     Status post fixation of the 1st 2nd and 3rd metatarsal cuneiform joints on both sides with extensive hardware in position.  Prior osteotomy of the proximal phalanx of the left big toe and proximal phalanx of the right big toe.  Healing midshaft fracture of the right 2nd metatarsal without angulation.  There is straightening of the 1st digit on both sides since the prior study of August 6, 2024        Electronically signed by:Oneil Ann MD  Date:                                            02/12/2025  Time:                                           09:45           Assessment:       1. Post-operative state    2. Metatarsus adductus of left foot    3. Painful orthopaedic hardware    4. Hallux valgus of left foot    5. Metatarsus adductus of both feet    6. S/P bunionectomy    7. Left foot pain    8. Hammer toe of left foot      Plan:   S/P bunionectomy    Post-operative state    Hallux valgus of left foot    Metatarsus adductus of left foot    Painful orthopaedic hardware    S/P hardware removal  - Right Akin staple     Personally reviewed X-ray left Foot, Three Views, and radiologists report. Discussed findings with patient including no signs of hardware loosening or failure,and no lost of correction or alignment. Early signs of healing appreciated across fusion sites.   Sutures removed from  right great toe,Tolerated well, no incident.   The surgical  dressing was removed showing no signs of infection, excess edema or malalignment.   NWB LLE, knee scooter assist   A new dry compressive dressing was applied and patient was instructed to leave dressing intact, clean, and dry until next visit or until instructed to remove.   CAM Boot, treat like cast  Pain medication as directed  Rest, Ice, Elevate above heart, Compression   Abx as directed   AJ ROM exercises   Follow up 2-3 weeks with Dr. Moon   This note was created using Dragon voice recognition software that occasionally misinterpreted phrases or words.    Bebeto Gonzalez DPM  Podiatry / Foot and Ankle Surgery   Secure chat preferred  Foot Exam  Physical Exam  Procedures

## 2025-02-19 ENCOUNTER — OCCUPATIONAL HEALTH (OUTPATIENT)
Dept: URGENT CARE | Facility: CLINIC | Age: 55
End: 2025-02-19

## 2025-02-23 DIAGNOSIS — T84.84XA PAINFUL ORTHOPAEDIC HARDWARE: ICD-10-CM

## 2025-02-23 DIAGNOSIS — M20.12 HALLUX VALGUS OF LEFT FOOT: ICD-10-CM

## 2025-02-23 DIAGNOSIS — Q66.222 METATARSUS ADDUCTUS OF LEFT FOOT: ICD-10-CM

## 2025-02-24 NOTE — TELEPHONE ENCOUNTER
----- Message from Ale sent at 2/24/2025  2:15 PM CST -----  Regarding: Refill request  Contact: pt  Type:  RX Refill RequestWho Called: ptRefill or New Rx:refillRX Name and Strength:doxycycline (VIBRAMYCIN) 100 MG CaoxyCODONE-acetaminophen (PERCOCET)  mg per tablet     How is the patient currently taking it? (ex. 1XDay):2/dayIs this a 30 day or 90 day RX:30Preferred Pharmacy with phone number:[facility] - [place], [place] - [address] [place] [address]Qlbqk: [phone] Fax: [phone] or Mail Order:localOrdering Provider:Marlinould the patient rather a call back or a response via [facility]? Call backBest Call Back Number:[phone]

## 2025-02-26 RX ORDER — DOXYCYCLINE 100 MG/1
100 CAPSULE ORAL 2 TIMES DAILY
Qty: 28 CAPSULE | Refills: 0 | Status: SHIPPED | OUTPATIENT
Start: 2025-02-26 | End: 2025-02-28 | Stop reason: SDUPTHER

## 2025-02-26 RX ORDER — OXYCODONE AND ACETAMINOPHEN 10; 325 MG/1; MG/1
1 TABLET ORAL EVERY 6 HOURS PRN
Qty: 27 TABLET | Refills: 0 | Status: SHIPPED | OUTPATIENT
Start: 2025-02-26 | End: 2025-02-28 | Stop reason: SDUPTHER

## 2025-02-27 ENCOUNTER — PATIENT MESSAGE (OUTPATIENT)
Dept: ADMINISTRATIVE | Facility: OTHER | Age: 55
End: 2025-02-27
Payer: COMMERCIAL

## 2025-02-28 RX ORDER — OXYCODONE AND ACETAMINOPHEN 10; 325 MG/1; MG/1
1 TABLET ORAL EVERY 6 HOURS PRN
Qty: 27 TABLET | Refills: 0 | Status: SHIPPED | OUTPATIENT
Start: 2025-02-28

## 2025-02-28 RX ORDER — DOXYCYCLINE 100 MG/1
100 CAPSULE ORAL 2 TIMES DAILY
Qty: 28 CAPSULE | Refills: 0 | Status: SHIPPED | OUTPATIENT
Start: 2025-02-28 | End: 2025-03-14

## 2025-03-05 ENCOUNTER — OFFICE VISIT (OUTPATIENT)
Dept: PODIATRY | Facility: CLINIC | Age: 55
End: 2025-03-05
Payer: COMMERCIAL

## 2025-03-05 ENCOUNTER — HOSPITAL ENCOUNTER (OUTPATIENT)
Dept: RADIOLOGY | Facility: CLINIC | Age: 55
Discharge: HOME | End: 2025-03-05
Attending: PODIATRIST
Payer: COMMERCIAL

## 2025-03-05 DIAGNOSIS — M20.12 HALLUX VALGUS OF LEFT FOOT: Primary | ICD-10-CM

## 2025-03-05 DIAGNOSIS — M79.672 LEFT FOOT PAIN: ICD-10-CM

## 2025-03-05 DIAGNOSIS — Z98.890 S/P BUNIONECTOMY: ICD-10-CM

## 2025-03-05 DIAGNOSIS — Q66.222 METATARSUS ADDUCTUS OF LEFT FOOT: ICD-10-CM

## 2025-03-05 PROCEDURE — 1159F MED LIST DOCD IN RCRD: CPT | Mod: CPTII,S$GLB,, | Performed by: PODIATRIST

## 2025-03-05 PROCEDURE — 99024 POSTOP FOLLOW-UP VISIT: CPT | Mod: S$GLB,,, | Performed by: PODIATRIST

## 2025-03-05 PROCEDURE — 99999 PR PBB SHADOW E&M-EST. PATIENT-LVL III: CPT | Mod: PBBFAC,,, | Performed by: PODIATRIST

## 2025-03-05 PROCEDURE — 73630 X-RAY EXAM OF FOOT: CPT | Mod: LT,S$GLB,, | Performed by: RADIOLOGY

## 2025-03-05 PROCEDURE — 1160F RVW MEDS BY RX/DR IN RCRD: CPT | Mod: CPTII,S$GLB,, | Performed by: PODIATRIST

## 2025-03-05 NOTE — LETTER
March 5, 2025      [facility] - Podiatry  [address]  [facility] [address]  Phone: [phone]  Fax: [phone]       Patient: Kevin Ca   YOB: 1970  Date of Visit: 03/05/2025    To Whom It May Concern:    Maddie aC  was at [facility] on 03/05/2025. If you have any questions or concerns, or if I can be of further assistance, please do not hesitate to contact me.    Sincerely,    Electronically Signed by: Coleman Moon DPM

## 2025-03-05 NOTE — PROGRESS NOTES
[address] [facility] [place] [address]  Phone: [phone]   [phone]    Patient's PCP:Elliott Robert MD  Referring Provider:No ref. provider found    Subjective:      Chief Complaint: Post-op Evaluation ( BUNIONECTOMY, LAPIDUS (Left)/Resection of 1st metartarsal head medial eminence (Left) /1st MTPJ Capsule tendon balancing (Left) /2nd and 3rd metarsal angular correction with osteotomy and fusion of 2nd and 3rd TMTJ (Left) /Akin Osteotomy 1st proximal phalanx (Left) /OSTEOTOMY, FOOT (Left)/REMOVAL, HARDWARE, FOOT)    Date of Surgery: 1/17/2025  Procedure:  BUNIONECTOMY, LAPIDUS (Left)/Resection of 1st metartarsal head medial eminence (Left) /1st MTPJ Capsule tendon balancing (Left) /2nd and 3rd metarsal angular correction with osteotomy and fusion of 2nd and 3rd TMTJ (Left) /Akin Osteotomy 1st proximal phalanx (Left) /OSTEOTOMY, FOOT (Left)/REMOVAL, HARDWARE, FOOT       HPI:   Kevin Ca Jr. is a 54 y.o. male who returns to the clinic today for his post-operative visit. Kevin Ca Jr. rates pain a 2/10 on a pain scale. Compliance of Care:  nonweightbearing in cam walker boot cast using knee scooter to ambulate. Minor pain mostly occurring at night.     Past Surgical History:   Procedure Laterality Date    ARTHROSCOPY OF KNEE Right 07/16/2019    Procedure: ARTHROSCOPY, KNEE;  Surgeon: Ganesh Berry MD;  Location: [facility] OR;  Service: Orthopedics;  Laterality: Right;    BUNIONECTOMY Right 08/16/2024    Procedure: BUNIONECTOMY;  Surgeon: Bebeto Gonzalez DPM;  Location: [facility] OR;  Service: Podiatry;  Laterality: Right;  [facility] notified  8/15    CORRECTION OF HAMMER TOE Left 1/17/2025    Procedure: CORRECTION, HAMMER TOE;  Surgeon: Bebeto Gonzalez DPM;  Location: [facility] OR;  Service: Podiatry;  Laterality: Left;  With tendon and capsule balancing    FOOT HARDWARE REMOVAL Right 1/17/2025    Procedure: REMOVAL, HARDWARE, FOOT;  Surgeon: Bebeto Gonzalez DPM;  Location: [facility] OR;   Service: Podiatry;  Laterality: Right;    FOOT OSTEOTOMY Left 1/17/2025    Procedure: OSTEOTOMY, FOOT;  Surgeon: Bebeto Gonzalez DPM;  Location: [facility] OR;  Service: Podiatry;  Laterality: Left;    HAND TENDON SURGERY Left 1992    HERNIA REPAIR Right 04/12/2017    with mesh    LAPIDUS BUNIONECTOMY Left 1/17/2025    Procedure: BUNIONECTOMY, LAPIDUS;  Surgeon: Bebeto Gonzalez DPM;  Location: [facility] OR;  Service: Podiatry;  Laterality: Left;    MIDFOOT ARTHRODESIS Right 08/16/2024    Procedure: FUSION, JOINT, MIDFOOT;  Surgeon: Bebeto Gonzalez DPM;  Location: [facility] OR;  Service: Podiatry;  Laterality: Right;    RECONSTRUCTION WITH FUSION OF CHARCOT FOOT Left 1/17/2025    Procedure: RECONSTRUCTION, CHARCOT FOOT, WITH FUSION;  Surgeon: Bebeto Gonzalez DPM;  Location: [facility] OR;  Service: Podiatry;  Laterality: Left;    REPAIR OF MENISCUS OF KNEE Right 07/16/2019    Procedure: REPAIR, MENISCUS, KNEE;  Surgeon: Ganesh Berry MD;  Location: [facility] OR;  Service: Orthopedics;  Laterality: Right;    right foot fracture  2005     Past Medical History:   Diagnosis Date    Arthritis     Back pain     GERD (gastroesophageal reflux disease)      Family History   Problem Relation Name Age of Onset    Cancer Maternal Grandfather          Social History:   Marital Status:   Alcohol History:  reports no history of alcohol use.  Tobacco History:  reports that he has never smoked. He has never used smokeless tobacco.  Drug History:  reports no history of drug use.    Review of patient's allergies indicates:  No Known Allergies    Current Medications[1]    Review of Systems   Constitutional:  Negative for chills, fatigue, fever and unexpected weight change.   HENT:  Negative for hearing loss and trouble swallowing.    Eyes:  Negative for photophobia and visual disturbance.   Respiratory:  Negative for cough, shortness of breath and wheezing.    Cardiovascular:  Negative for chest pain, palpitations and leg swelling.    Gastrointestinal:  Negative for abdominal pain and nausea.   Genitourinary:  Negative for dysuria and frequency.   Musculoskeletal:  Positive for arthralgias, gait problem and joint swelling. Negative for back pain and myalgias.   Skin:  Negative for rash.   Neurological:  Negative for tremors, seizures, speech difficulty, weakness and headaches.   Hematological:  Does not bruise/bleed easily.         Objective:        Post-op surgery of the left foot with normal healing, no signs of infection or dehiscence of wound. Hardware in place. Normal post op exam today. No redness, no drainage, no increase in local temperature, no significant swelling, surgical incisions are healed.  Small scab overlying the proximal portion of the 1st ray incision.  Toes are in a rectus position.  Range of motion of the great toe mildly improved though still limited particularly in plantar flexion.    Imaging: X-Ray Foot Complete Left  EXAMINATION:  XR FOOT COMPLETE 3 VIEW LEFT    CLINICAL HISTORY:  .  Hallux valgus (acquired), left foot    TECHNIQUE:  AP, lateral and oblique views of the left foot were performed.    COMPARISON:  02/12/2025    FINDINGS:  Prior osteotomy with staple fixation of the 1st toe proximal phalanx.  Additional plate screw fixation for 1st through 3rd TMT joint arthrodesis.  No abnormal lucency surrounding hardware.  No detrimental change in alignment.  Plantar calcaneal spur.  Scattered forefoot degenerative change.  Dorsal foot soft tissue swelling.    Electronically signed by: Randy Gates  Date:    03/05/2025  Time:    08:50        Physical Exam:   Foot Exam  Physical Exam       Assessment:       1. Hallux valgus of left foot    2. Metatarsus adductus of left foot    3. S/P bunionectomy    4. Left foot pain      Plan:   Hallux valgus of left foot  -     X-Ray Foot Complete Left    Metatarsus adductus of left foot    S/P bunionectomy    Left foot pain      Follow up in about 4 weeks (around 4/2/2025), or if  symptoms worsen or fail to improve, for x-ray.    Procedures - None    Patient is progressing well postoperatively.  At this time he can begin full weight-bearing in the Cam Walker boot.  Instructed him on range of motion exercises for the great toe.  Recommend ice as needed for pain and swelling.  He is okay to fully bathe and shower the foot.  I recommend he stay in the cam walker boot until following up with Dr. Gonzalez in 4 weeks.    CAM walker boot with weight bearing  Ice to painful area, 15 minutes at a time  Ace-wrap to help with swelling  No barefoot   Keep affected extremity elevated while seated      This note was created using Dragon voice recognition software that occasionally misinterpreted phrases or words.               [1]   Current Outpatient Medications   Medication Sig Dispense Refill    doxycycline (VIBRAMYCIN) 100 MG Cap Take 1 capsule (100 mg total) by mouth 2 (two) times daily. for 14 days 28 capsule 0    multivitamin (ONE DAILY MULTIVITAMIN) per tablet Take 1 tablet by mouth once daily.      naproxen sodium (ANAPROX) 220 MG tablet Take 220 mg by mouth every 12 (twelve) hours.      omega-3 fatty acids/fish oil (FISH OIL-OMEGA-3 FATTY ACIDS) 300-1,000 mg capsule Take 1 capsule by mouth once daily.      omeprazole (PRILOSEC) 40 MG capsule Take 1 capsule (40 mg total) by mouth once daily. 90 capsule 3    oxyCODONE-acetaminophen (PERCOCET)  mg per tablet Take 1 tablet by mouth every 6 (six) hours as needed for Pain. 27 tablet 0    pregabalin (LYRICA) 50 MG capsule Take 2 capsules (100 mg total) by mouth 2 (two) times daily. 60 capsule 6     No current facility-administered medications for this visit.

## 2025-04-02 ENCOUNTER — OCCUPATIONAL HEALTH (OUTPATIENT)
Dept: URGENT CARE | Facility: CLINIC | Age: 55
End: 2025-04-02

## 2025-04-02 ENCOUNTER — HOSPITAL ENCOUNTER (OUTPATIENT)
Dept: RADIOLOGY | Facility: CLINIC | Age: 55
Discharge: HOME | End: 2025-04-02
Payer: COMMERCIAL

## 2025-04-02 ENCOUNTER — OFFICE VISIT (OUTPATIENT)
Dept: PODIATRY | Facility: CLINIC | Age: 55
End: 2025-04-02
Payer: COMMERCIAL

## 2025-04-02 VITALS — RESPIRATION RATE: 18 BRPM | BODY MASS INDEX: 34.03 KG/M2 | HEIGHT: 69 IN | WEIGHT: 229.75 LBS

## 2025-04-02 DIAGNOSIS — Z98.890 S/P BUNIONECTOMY: Primary | ICD-10-CM

## 2025-04-02 DIAGNOSIS — M79.672 LEFT FOOT PAIN: ICD-10-CM

## 2025-04-02 DIAGNOSIS — M20.12 HALLUX VALGUS OF LEFT FOOT: ICD-10-CM

## 2025-04-02 DIAGNOSIS — Q66.222 METATARSUS ADDUCTUS OF LEFT FOOT: ICD-10-CM

## 2025-04-02 PROCEDURE — 99999 PR PBB SHADOW E&M-EST. PATIENT-LVL IV: CPT | Mod: PBBFAC,,,

## 2025-04-02 PROCEDURE — 73630 X-RAY EXAM OF FOOT: CPT | Mod: LT,S$GLB,, | Performed by: RADIOLOGY

## 2025-04-02 NOTE — PROGRESS NOTES
"    [address] [facility] [address] [place] [address]  Phone: [phone]   [phone]    Patient's PCP:Elliott Robert MD  Referring Provider:No ref. provider found    Subjective:      Chief Complaint: Post-op Evaluation (BUNIONECTOMY, LAPIDUS (Left)/Resection of 1st metartarsal head medial eminence (Left) /1st MTPJ Capsule tendon balancing (Left) /2nd and 3rd metarsal angular correction with osteotomy and fusion of 2nd and 3rd TMTJ (Left) /Akin Osteotomy 1st proximal phalanx (Left) /OSTEOTOMY, FOOT (Left)/REMOVAL, HARDWARE, FOOT)    Date of Surgery: 01/17/2025  Procedure: BUNIONECTOMY, LAPIDUS (Left)/Resection of 1st metartarsal head medial eminence (Left) /1st MTPJ Capsule tendon balancing (Left) /2nd and 3rd metarsal angular correction with osteotomy and fusion of 2nd and 3rd TMTJ (Left) /Akin Osteotomy 1st proximal phalanx (Left) /OSTEOTOMY, FOOT (Left)/REMOVAL, HARDWARE, FOOT     HPI:   Kevin Ca Jr. is a 54 y.o. male who returns to the clinic today for his post-operative visit. Kevin Ca Jr. rates pain a 3/10 on a pain scale. Compliance of Care:  WBAT in CAM boot with minimal discomfort and swelling at the end of the day. Swelling has been improving with use of compression stocking. Patient has been tapering off the pain medication and is doing well on Lyrica.     Vitals:    04/02/25 0813   Resp: 18   Weight: 104.2 kg (229 lb 11.5 oz)   Height: 5' 9" (1.753 m)   PainSc:   3        Past Surgical History:   Procedure Laterality Date    ARTHROSCOPY OF KNEE Right 07/16/2019    Procedure: ARTHROSCOPY, KNEE;  Surgeon: Ganesh Berry MD;  Location: [facility] OR;  Service: Orthopedics;  Laterality: Right;    BUNIONECTOMY Right 08/16/2024    Procedure: BUNIONECTOMY;  Surgeon: Bebeto Gonzalez DPM;  Location: [facility] OR;  Service: Podiatry;  Laterality: Right;  Pineda notified  8/15    CORRECTION OF HAMMER TOE Left 1/17/2025    Procedure: CORRECTION, HAMMER TOE;  Surgeon: Bebeto Gonzalez DPM;  " Location: [facility];  Service: Podiatry;  Laterality: Left;  With tendon and capsule balancing    FOOT HARDWARE REMOVAL Right 1/17/2025    Procedure: REMOVAL, HARDWARE, FOOT;  Surgeon: Bebeto Gonzalez DPM;  Location: [facility] OR;  Service: Podiatry;  Laterality: Right;    FOOT OSTEOTOMY Left 1/17/2025    Procedure: OSTEOTOMY, FOOT;  Surgeon: Bebeto Gonzalez DPM;  Location: [facility] OR;  Service: Podiatry;  Laterality: Left;    HAND TENDON SURGERY Left 1992    HERNIA REPAIR Right 04/12/2017    with mesh    LAPIDUS BUNIONECTOMY Left 1/17/2025    Procedure: BUNIONECTOMY, LAPIDUS;  Surgeon: Bebeto Gonzalez DPM;  Location: [facility] OR;  Service: Podiatry;  Laterality: Left;    MIDFOOT ARTHRODESIS Right 08/16/2024    Procedure: FUSION, JOINT, MIDFOOT;  Surgeon: Bebeto Gonzalez DPM;  Location: [facility];  Service: Podiatry;  Laterality: Right;    RECONSTRUCTION WITH FUSION OF CHARCOT FOOT Left 1/17/2025    Procedure: RECONSTRUCTION, CHARCOT FOOT, WITH FUSION;  Surgeon: Bebeto Gonzalez DPM;  Location: [facility] OR;  Service: Podiatry;  Laterality: Left;    REPAIR OF MENISCUS OF KNEE Right 07/16/2019    Procedure: REPAIR, MENISCUS, KNEE;  Surgeon: Ganesh Berry MD;  [facility];  Service: Orthopedics;  Laterality: Right;    right foot fracture  2005     Past Medical History:   Diagnosis Date    Arthritis     Back pain     GERD (gastroesophageal reflux disease)      Family History   Problem Relation Name Age of Onset    Cancer Maternal Grandfather          Social History:   Marital Status:   Alcohol History:  reports no history of alcohol use.  Tobacco History:  reports that he has never smoked. He has never used smokeless tobacco.  Drug History:  reports no history of drug use.    Review of patient's allergies indicates:  No Known Allergies    Current Medications[1]    Review of Systems   Constitutional:  Negative for chills, fatigue, fever and unexpected weight change.   HENT:  Negative for hearing loss and trouble  swallowing.    Eyes:  Negative for photophobia and visual disturbance.   Respiratory:  Negative for cough, shortness of breath and wheezing.    Cardiovascular:  Negative for chest pain, palpitations and leg swelling.   Gastrointestinal:  Negative for abdominal pain and nausea.   Genitourinary:  Negative for dysuria and frequency.   Musculoskeletal:  Positive for arthralgias, gait problem and joint swelling. Negative for back pain and myalgias.   Skin:  Negative for color change, rash and wound.   Neurological:  Positive for headaches. Negative for tremors, seizures, speech difficulty and weakness.   Hematological:  Does not bruise/bleed easily.         Objective:        Physical Exam:   Post-op surgery of the left  with normal healing, no signs of infection, incision sites are 100% healed.   Hardware in place.   Normal post op exam today. No redness, no drainage, no increase in local temperature, no significant swelling.     Imaging:   Narrative & Impression  EXAMINATION:  XR FOOT COMPLETE 3 VIEW LEFT     CLINICAL HISTORY:  .  Hallux valgus (acquired), left foot     TECHNIQUE:  AP, lateral and oblique views of the left foot were performed.     COMPARISON:  None     FINDINGS:  The patient has had an osteotomy of the distal phalanx of the left big toe with a U shaped nail in place.  Degenerative changes are noted at the 1st metatarsophalangeal joint.  Patient has had fixation of the 1st metatarsal cuneiform joint with 2 cortical plates and screws in position with straightening of this digit.  Patient has also had fixation of the 2nd and 3rd metatarsal cuneiform joints with cortical plates and screws in position.  Small heel spur is noted.     Impression:     Prior osteotomy of the big toe.  Prior fixation of the 1st 2nd and 3rd metatarsal cuneiform joints with cortical plates and screws in position.        Electronically signed by:Oneil Ann MD  Date:                                            04/02/2025  Time:                                            09:33         Assessment:       1. Hallux valgus of left foot    2. Metatarsus adductus of left foot    3. S/P bunionectomy    4. Left foot pain      Plan:   Hallux valgus of left foot    Metatarsus adductus of left foot    S/P bunionectomy    Left foot pain    Xray Left foot obtained with no shift in positioning, hardware failure/loosening, no fracture/dislocation, fusion sites are about 50-60% consolidated.   WBAT in CAM Boot, Can begin transition into supportive athletic shoe gear with OTC inserts as tolerated   Continue AJ and MTPJ ROM stretches and exercises   Continue compression   Physical therapy ordered   No dressings needed  Massage Vitamin E oil and Maderma into surgical scars b/l foot to improve appearance.   Follow up in 4 weeks     Beebto Gonzalez DPM  Podiatry / Foot and Ankle Surgery   Secure chat preferred          [1]   Current Outpatient Medications   Medication Sig Dispense Refill    multivitamin (ONE DAILY MULTIVITAMIN) per tablet Take 1 tablet by mouth once daily.      naproxen sodium (ANAPROX) 220 MG tablet Take 220 mg by mouth every 12 (twelve) hours.      omega-3 fatty acids/fish oil (FISH OIL-OMEGA-3 FATTY ACIDS) 300-1,000 mg capsule Take 1 capsule by mouth once daily.      omeprazole (PRILOSEC) 40 MG capsule Take 1 capsule (40 mg total) by mouth once daily. 90 capsule 3    oxyCODONE-acetaminophen (PERCOCET)  mg per tablet Take 1 tablet by mouth every 6 (six) hours as needed for Pain. 27 tablet 0    pregabalin (LYRICA) 50 MG capsule Take 2 capsules (100 mg total) by mouth 2 (two) times daily. 60 capsule 6     No current facility-administered medications for this visit.

## 2025-04-02 NOTE — LETTER
April 2, 2025      [facility] - Podiatry  [address]  [address]  [facility] [address]  Phone: [phone]  Fax: [phone]       Patient: Kevin Ca   YOB: 1970  Date of Visit: 04/02/2025    To Whom It May Concern:    Maddie Ca  was at [facility] on 04/02/2025. The patient may return to work on 7/1/2025. If you have any questions or concerns, or if I can be of further assistance, please do not hesitate to contact me.    Sincerely,

## 2025-04-17 ENCOUNTER — CLINICAL SUPPORT (OUTPATIENT)
Dept: REHABILITATION | Facility: HOSPITAL | Age: 55
End: 2025-04-17
Payer: COMMERCIAL

## 2025-04-17 DIAGNOSIS — R29.898 ANKLE WEAKNESS: ICD-10-CM

## 2025-04-17 DIAGNOSIS — M25.672 DECREASED RANGE OF MOTION OF LEFT ANKLE: Primary | ICD-10-CM

## 2025-04-17 PROCEDURE — 97530 THERAPEUTIC ACTIVITIES: CPT | Mod: PN

## 2025-04-17 PROCEDURE — 97161 PT EVAL LOW COMPLEX 20 MIN: CPT | Mod: PN

## 2025-04-17 NOTE — PROGRESS NOTES
Outpatient Rehab    Physical Therapy Evaluation    Patient Name: Kevin Ca Jr.  MRN: [id number]  YOB: 1970  Encounter Date: 4/17/2025    Therapy Diagnosis:   Encounter Diagnoses   Name Primary?    Decreased range of motion of left ankle Yes    Ankle weakness      Physician: Bebeto Gonzalez DPM    Physician Orders: Eval and Treat  Medical Diagnosis: S/P bunionectomy    Visit # / Visits Authorized:  1 / 1  Insurance Authorization Period: 4/2/2025 to 4/2/2026  Date of Evaluation: 4/17/2025  Plan of Care Certification: 4/17/2025 to 06/12/2025     Time In: 1000   Time Out: 1056  Total Time: 56   Total Billable Time: 56    Intake Outcome Measure for FOTO Survey    Therapist reviewed FOTO scores for Kevin Ca Jr. on 4/17/2025.   FOTO report - see Media section or FOTO account episode details.     Intake Score: 45%         Subjective   History of Present Illness  Kevin is a 54 y.o. male who reports to physical therapy with a chief concern of S/P bunionectomy.       Patient reports a surgery of Z98.890 (ICD-10-CM) - S/P bunionectomy. Surgery occurred on 01/17/25.         Dominant Hand: Right  History of Present Condition/Illness: At this time Kevin 12 weeks and 6 days post op from a bunionectomy; He had his other foot done and at this point it is just time to get his Left one done. He can stand for about 5 minutes before he has pain. Walking is fairly pain free. He has returned to the gym and has been doing pool related task. He is looking to get back to work soon.      Activities of Daily Living  Social history was obtained from Patient.               Previously independent with activities of daily living? Yes     Currently independent with activities of daily living? Yes          Previously independent with instrumental activities of daily living? Yes     Currently independent with instrumental activities of daily living? Yes              Pain     Patient reports a current pain level of 0/10.  Pain at best is reported as 2/10. Pain at worst is reported as 6/10.   Clinical Progression (since onset): Stable         Living Arrangements  Living Situation  Housing: Home independently  Living Arrangements: Spouse/significant other        Employment  Patient reports: Does the patient's condition impact their ability to work?  Employment Status: Employed full-time   Works at the [employer]      Past Medical History/Physical Systems Review:   Kevin Ca Jr.  has a past medical history of Arthritis, Back pain, and GERD (gastroesophageal reflux disease).    Kevin Ca Jr.  has a past surgical history that includes Hand tendon surgery (Left, 1992); Hernia repair (Right, 04/12/2017); Arthroscopy of knee (Right, 07/16/2019); Repair of meniscus of knee (Right, 07/16/2019); Bunionectomy (Right, 08/16/2024); Midfoot arthrodesis (Right, 08/16/2024); right foot fracture (2005); Lapidus bunionectomy (Left, 1/17/2025); Reconstruction with fusion of Charcot foot (Left, 1/17/2025); Correction of hammer toe (Left, 1/17/2025); Foot Osteotomy (Left, 1/17/2025); and Foot hardware removal (Right, 1/17/2025).    Kevin has a current medication list which includes the following prescription(s): multivitamin, naproxen sodium, fish oil-omega-3 fatty acids, omeprazole, oxycodone-acetaminophen, and pregabalin.    Review of patient's allergies indicates:  No Known Allergies     Objective   Posture                 Decreased loading through first ray on left lower extremity; decreased great toe extension with ambulation, decreased ankle dorsiflexion with ambulation.    Ankle/Foot Observations     Dry and intact incision      Lower Extremity Sensation  Right Lumbar/Lower Extremity Sensation  Intact: Light Touch       Left Lumbar/Lower Extremity Sensation  Intact: Light Touch                 Ankle/Foot Range of Motion   Right Ankle/Foot   Active (deg) Passive (deg) Pain   Dorsiflexion (KE)   15     Dorsiflexion (KF)          Plantar Flexion   45     Ankle Inversion         Ankle Eversion         Subtalar Inversion         Subtalar Eversion         Great Toe MTP Flexion   8     Great Toe MTP Extension   20     Great Toe IP Flexion             Left Ankle/Foot   Active (deg) Passive (deg) Pain   Dorsiflexion (KE)   15     Dorsiflexion (KF)         Plantar Flexion   45     Ankle Inversion         Ankle Eversion         Subtalar Inversion         Subtalar Eversion         Great Toe MTP Flexion   5 (symmetrical following manual therapy.)     Great Toe MTP Extension   5     Great Toe IP Flexion                            Ankle/Foot Strength - Planes of Motion   Right Strength Right Pain Left Strength Left  Pain   Dorsiflexion (L4) 5         Plantar Flexion (S1) 5         Inversion 5         Eversion 5         Great Toe Flexion 5         Great Toe Extension (L5) 5         Lesser Toes Flexion 5         Lesser Toes Extension 5         Left lower extremity strength not assessed at this time.         Gait Analysis  Base of Support: Normal            Ankle/Foot Observations During Gait  Left: Ankle Delayed Push Off, Flat Foot Initial Contact, and Toe Out During Gait         Treatment:  Therapeutic Activity  TA 1: seated heel raise focusing on toe extension, x15  TA 2: standing with towel under lateral foot column to improve great toe weightbearing, twist x10    Time Entry(in minutes):  PT Evaluation (Low) Time Entry: 45  Therapeutic Activity Time Entry: 12    Assessment & Plan   Assessment  Kevin presents with a condition of Low complexity.   Presentation of Symptoms: Stable  Will Comorbidities Impact Care: No       Functional Limitations: Activity tolerance, Completing self-care activities, Completing work/school activities, Decreased ambulation distance/endurance, Functional mobility, Gait limitations, Pain with ADLs/IADLs, Painful locomotion/ambulation, Participating in leisure activities, Participating in sports, Proprioception, Range of  motion, Squatting, Standing tolerance  Impairments: Abnormal gait, Activity intolerance, Impaired physical strength, Lack of appropriate home exercise program    Patient Goal for Therapy (PT): I want to get to where I am 100%.  Prognosis: Good  Assessment Details: Kevin presents with signs and symptoms consistent with S/P Bunionectomy. At this time he has decreased foot/ankle mobility and decreased great toe motion. This is to be expected considering his surgery. His range of motion is also limiting his strength; these deficits are limiting his ability to weightbear and complete his ADLs, self care and work related task. He makes a good candidate for skilled Physical Therapy to improve the above listed deficits.     Plan  From a physical therapy perspective, the patient would benefit from: Skilled Rehab Services    Planned therapy interventions include: Therapeutic exercise, Therapeutic activities, Neuromuscular re-education, Manual therapy, and ADLs/IADLs.    Planned modalities to include: Cryotherapy (cold pack), Electrical stimulation - attended, Electrical stimulation - passive/unattended, Thermotherapy (hot pack), and Other (Comment). Dry Needling      Visit Frequency: 2 times Per Week for 8 Weeks.       This plan was discussed with Patient.   Discussion participants: Agreed Upon Plan of Care  Plan details: Develop lateral column mobility in foot to improve comfort with placing weight through the great toe.          Patient's spiritual, cultural, and educational needs considered and patient agreeable to plan of care and goals.           Goals:   Active       Long Term Goals        8 weeks        Start:  04/23/25    Expected End:  06/19/25         The patient will improve their FOTO Score by at least 15% as evidence of clinically significant improvements in their function.     Patient will increase foot complex strength to at least a 4+/5 to improve their ability to complete transfers and walking  task.    Patient will increase foot and ankle ROM (dorsiflexion and great toe extension) by at least 50% order to improve their ability to complete transfers and ambulation and work related activities.     Patient will demonstrate independence in home program for support of progression             Short Term Goals       4 weeks        Start:  04/23/25    Expected End:  05/22/25       Pt will demonstrate independence with initial HEP to improve their functional mobility    The patient will increase hip complex strength to at least a 4-/5 to improve their ability to complete transfers and work related task    Patient will report pain of 0/10 demonstrating a reduction of overall pain.             Contreras Roblero, PT, DPT

## 2025-04-17 NOTE — PATIENT INSTRUCTIONS
HOME EXERCISE PROGRAM  Created by Contreras Roblero  Apr 17th, 2025  View videos at [email or web address]        HEEL RAISE - CALF RAISE - BILATERAL    While seated in a chair, place your feet on the floor.    Next, press down with your forefoot and toes to raise your heels up off the floor. Lower your heels back down and repeat.    Keep your toes on the ground the entire time.    Video # [id number] Repeat 15 Times   Hold 3 Seconds   Complete 2 Sets   Perform 3 Times a Day          Ankle eversion standing on towel    Stand so that your feet are straddling the edges of a small towel or towels. Your ankle should be rolling slightly outward. Then lift the little toe and outside edge of the foot up off of the floor. Repeat 15 Times   Hold 3 Seconds   Complete 2 Sets   Perform 3 Times a Day          PARTIAL SIT TO STAND - WEDGE AND STRADDLE STANCE    Sit in a chair and place your affected side foot on a foam wedge. This will help to reduce excessive inversion at the ankle. Place your other foot in a more forward position in relation to your other leg. This will help to encourage more weight distribution on to your affected leg (the foot that is on the wedge). Then, raise up to a partial stand without fully standing and then sit back down and repeat.    You can hold on to another chair or countertop for assist if needed.    Video # [id number] Repeat 15 Times   Hold 3 Seconds   Complete 2 Sets   Perform 3 Times a Day

## 2025-04-22 PROBLEM — R29.898 ANKLE WEAKNESS: Status: ACTIVE | Noted: 2025-04-22

## 2025-04-22 PROBLEM — M25.672 DECREASED RANGE OF MOTION OF LEFT ANKLE: Status: ACTIVE | Noted: 2025-04-22

## 2025-04-24 ENCOUNTER — CLINICAL SUPPORT (OUTPATIENT)
Dept: REHABILITATION | Facility: HOSPITAL | Age: 55
End: 2025-04-24
Payer: COMMERCIAL

## 2025-04-24 DIAGNOSIS — M25.672 DECREASED RANGE OF MOTION OF LEFT ANKLE: Primary | ICD-10-CM

## 2025-04-24 DIAGNOSIS — R29.898 ANKLE WEAKNESS: ICD-10-CM

## 2025-04-24 PROCEDURE — 97112 NEUROMUSCULAR REEDUCATION: CPT | Mod: PN

## 2025-04-24 PROCEDURE — 97530 THERAPEUTIC ACTIVITIES: CPT | Mod: PN

## 2025-04-24 PROCEDURE — 97140 MANUAL THERAPY 1/> REGIONS: CPT | Mod: PN

## 2025-04-24 NOTE — PROGRESS NOTES
Outpatient Rehab    Physical Therapy Visit    Patient Name: Kevin Ca Jr.  MRN: [id number]  YOB: 1970  Encounter Date: 4/24/2025    Therapy Diagnosis:   Encounter Diagnoses   Name Primary?    Decreased range of motion of left ankle Yes    Ankle weakness      Physician: Bebeto Gonzalez DPM    Physician Orders: Eval and Treat  Medical Diagnosis: S/P bunionectomy    Visit # / Visits Authorized:  1 / 10  Insurance Authorization Period: 4/21/2025 to 12/31/2025  Date of Evaluation: 04/17/2025  Plan of Care Certification: 04/17/2025 to 06/19/2025     PT/PTA:     Number of PTA visits since last PT visit:   Time In: 1400   Time Out: 1457  Total Time: 57   Total Billable Time: 57    FOTO:  Intake Score:  %  Survey Score 1:  %  Survey Score 2:  %         Subjective   He feels like he is walking a little better, but he can still feel that toe..  Pain reported as 0/10.      Objective       Ankle/Foot Range of Motion   Right Ankle/Foot   Active (deg) Passive (deg) Pain   Dorsiflexion (KE)         Dorsiflexion (KF)         Plantar Flexion         Ankle Inversion         Ankle Eversion         Subtalar Inversion         Subtalar Eversion         Great Toe MTP Flexion         Great Toe MTP Extension   34     Great Toe IP Flexion             Left Ankle/Foot   Active (deg) Passive (deg) Pain   Dorsiflexion (KE)         Dorsiflexion (KF)         Plantar Flexion         Ankle Inversion         Ankle Eversion         Subtalar Inversion         Subtalar Eversion         Great Toe MTP Flexion         Great Toe MTP Extension   34     Great Toe IP Flexion                         Treatment:  Manual Therapy  MT 1: Talocrural AP grade I-III  Balance/Neuromuscular Re-Education  NMR 1: Eversion self lateral column mobilization with towel roll, x10 B  NMR 2: prowler heel raise, 2 x 10 B  NMR 3: Sidelying clamshells with GTB, 2 x muscle burn  NMR 4: Lateral walking with green theraband, x 10 yds  NMR 5: Towel scrunch with  dorsiflexion, x5 B / Toe yoga, 2 minutes B  Therapeutic Activity  TA 1: Double leg bridge, 3 x8, 2 second hold  TA 2: Seated heel raise 2X 15#  TA 3: Double leg shuttle 100#, 3 x 8    Time Entry(in minutes):  Manual Therapy Time Entry: 9  Neuromuscular Re-Education Time Entry: 28  Therapeutic Activity Time Entry: 20    Assessment & Plan   Assessment: Kevin is doing well at this time. Treatment focused on developing his foot mobility and as well as his comfort with bearing weight through his first met. He has responded well and demonstrates improved gait pattern. Treatment will continue to advance as tolerated.  Evaluation/Treatment Tolerance: Patient tolerated treatment well    Patient will continue to benefit from skilled outpatient physical therapy to address the deficits listed in the problem list box on initial evaluation, provide pt/family education and to maximize pt's level of independence in the home and community environment.     Patient's spiritual, cultural, and educational needs considered and patient agreeable to plan of care and goals.           Plan: Continue per established POC.    Goals:   Active       Long Term Goals        8 weeks  (Progressing)       Start:  04/23/25    Expected End:  06/19/25         The patient will improve their FOTO Score by at least 15% as evidence of clinically significant improvements in their function.     Patient will increase foot complex strength to at least a 4+/5 to improve their ability to complete transfers and walking task.    Patient will increase foot and ankle ROM (dorsiflexion and great toe extension) by at least 50% order to improve their ability to complete transfers and ambulation and work related activities.     Patient will demonstrate independence in home program for support of progression             Short Term Goals       4 weeks  (Progressing)       Start:  04/23/25    Expected End:  05/22/25       Pt will demonstrate independence with initial HEP  to improve their functional mobility    The patient will increase hip complex strength to at least a 4-/5 to improve their ability to complete transfers and work related task    Patient will report pain of 0/10 demonstrating a reduction of overall pain.             Contreras Roblero, PT, DPT

## 2025-05-01 ENCOUNTER — CLINICAL SUPPORT (OUTPATIENT)
Dept: REHABILITATION | Facility: HOSPITAL | Age: 55
End: 2025-05-01
Payer: COMMERCIAL

## 2025-05-01 DIAGNOSIS — M25.672 DECREASED RANGE OF MOTION OF LEFT ANKLE: Primary | ICD-10-CM

## 2025-05-01 DIAGNOSIS — R29.898 ANKLE WEAKNESS: ICD-10-CM

## 2025-05-01 PROCEDURE — 97140 MANUAL THERAPY 1/> REGIONS: CPT | Mod: PN,CQ

## 2025-05-01 PROCEDURE — 97112 NEUROMUSCULAR REEDUCATION: CPT | Mod: PN,CQ

## 2025-05-01 PROCEDURE — 97530 THERAPEUTIC ACTIVITIES: CPT | Mod: PN,CQ

## 2025-05-02 NOTE — PROGRESS NOTES
Outpatient Rehab    Physical Therapy Visit    Patient Name: Kevin Ca Jr.  MRN: [id number]  YOB: 1970  Encounter Date: 5/1/2025    Therapy Diagnosis:   Encounter Diagnoses   Name Primary?    Decreased range of motion of left ankle Yes    Ankle weakness      Physician: Bebeto Gonzalez DPM    Physician Orders: Eval and Treat  Medical Diagnosis: S/P bunionectomy    Visit # / Visits Authorized:  2 / 10  Insurance Authorization Period: 4/21/2025 to 12/31/2025  Date of Evaluation: 04/17/2025  Plan of Care Certification: 04/17/2025 to 06/19/2025     PT/PTA:     Number of PTA visits since last PT visit:   Time In: 1400   Time Out: 1500  Total Time: 60   Total Billable Time: 58    FOTO:  Intake Score:  %  Survey Score 1:  %  Survey Score 2:  %         Subjective   Patient came into clinic with nothing new to report continued bilat great toe stiffness and pain with activitiy.  Pain reported as 0/10.      Objective            Treatment:  Manual Therapy  MT 1: Talocrural AP grade I-III  Balance/Neuromuscular Re-Education  NMR 1: Eversion self lateral column mobilization with towel roll, x10 B  NMR 2: prowler heel raise, 2 x 10 B  NMR 3: Sidelying clamshells with GTB, 2 x muscle burn  NMR 4: Lateral walking with green theraband, 4 x 10 yds  NMR 5: Towel scrunch with dorsiflexion, x5 B / Toe yoga, 2 minutes B  Therapeutic Activity  TA 1: Double leg bridge, 3 x10, 2 second hold  TA 2: Seated heel raise 2X 15#  TA 3: Double leg shuttle 112#, 3 x 10  TA 4: sled push 3 x 10 yards; 1x10 yards on balls of feet/heels lifted  TA 5: reverse lunges at window 3 x 10 reps    Time Entry(in minutes):  Manual Therapy Time Entry: 8  Neuromuscular Re-Education Time Entry: 23  Therapeutic Activity Time Entry: 23    Assessment & Plan   Assessment: Kevin tolerated all exercise well. Increased load on shuttle exercise this day. Incorporated reverse lunges for knee and ankle mobility and BLE strengthening. Will continue to  progress as tolerated.  Evaluation/Treatment Tolerance: Patient tolerated treatment well    Patient will continue to benefit from skilled outpatient physical therapy to address the deficits listed in the problem list box on initial evaluation, provide pt/family education and to maximize pt's level of independence in the home and community environment.     Patient's spiritual, cultural, and educational needs considered and patient agreeable to plan of care and goals.           Plan: Continue per established POC.    Goals:   Active       Long Term Goals        8 weeks  (Progressing)       Start:  04/23/25    Expected End:  06/19/25         The patient will improve their FOTO Score by at least 15% as evidence of clinically significant improvements in their function.     Patient will increase foot complex strength to at least a 4+/5 to improve their ability to complete transfers and walking task.    Patient will increase foot and ankle ROM (dorsiflexion and great toe extension) by at least 50% order to improve their ability to complete transfers and ambulation and work related activities.     Patient will demonstrate independence in home program for support of progression             Short Term Goals       4 weeks  (Progressing)       Start:  04/23/25    Expected End:  05/22/25       Pt will demonstrate independence with initial HEP to improve their functional mobility    The patient will increase hip complex strength to at least a 4-/5 to improve their ability to complete transfers and work related task    Patient will report pain of 0/10 demonstrating a reduction of overall pain.             Maribel Lorenz, LORETTA

## 2025-05-08 ENCOUNTER — OCCUPATIONAL HEALTH (OUTPATIENT)
Dept: URGENT CARE | Facility: CLINIC | Age: 55
End: 2025-05-08

## 2025-05-08 ENCOUNTER — OFFICE VISIT (OUTPATIENT)
Dept: PODIATRY | Facility: CLINIC | Age: 55
End: 2025-05-08
Payer: COMMERCIAL

## 2025-05-08 ENCOUNTER — CLINICAL SUPPORT (OUTPATIENT)
Dept: REHABILITATION | Facility: HOSPITAL | Age: 55
End: 2025-05-08
Payer: COMMERCIAL

## 2025-05-08 VITALS — BODY MASS INDEX: 34.74 KG/M2 | HEIGHT: 69 IN | WEIGHT: 234.56 LBS

## 2025-05-08 DIAGNOSIS — M20.12 HALLUX VALGUS OF LEFT FOOT: ICD-10-CM

## 2025-05-08 DIAGNOSIS — R29.898 ANKLE WEAKNESS: ICD-10-CM

## 2025-05-08 DIAGNOSIS — Q66.222 METATARSUS ADDUCTUS OF LEFT FOOT: ICD-10-CM

## 2025-05-08 DIAGNOSIS — Z98.890 S/P BUNIONECTOMY: Primary | ICD-10-CM

## 2025-05-08 DIAGNOSIS — M25.672 DECREASED RANGE OF MOTION OF LEFT ANKLE: Primary | ICD-10-CM

## 2025-05-08 DIAGNOSIS — M79.672 LEFT FOOT PAIN: ICD-10-CM

## 2025-05-08 DIAGNOSIS — Z98.890 POST-OPERATIVE STATE: ICD-10-CM

## 2025-05-08 PROCEDURE — 97112 NEUROMUSCULAR REEDUCATION: CPT | Mod: PN

## 2025-05-08 PROCEDURE — 97530 THERAPEUTIC ACTIVITIES: CPT | Mod: PN

## 2025-05-08 PROCEDURE — 97140 MANUAL THERAPY 1/> REGIONS: CPT | Mod: PN

## 2025-05-08 PROCEDURE — 97110 THERAPEUTIC EXERCISES: CPT | Mod: PN

## 2025-05-08 PROCEDURE — 99999 PR PBB SHADOW E&M-EST. PATIENT-LVL III: CPT | Mod: PBBFAC,,,

## 2025-05-08 NOTE — PROGRESS NOTES
Outpatient Rehab    Physical Therapy Visit    Patient Name: Kevin Ca Jr.  MRN: [id number]  YOB: 1970  Encounter Date: 5/8/2025    Therapy Diagnosis:   Encounter Diagnoses   Name Primary?    Decreased range of motion of left ankle Yes    Ankle weakness      Physician: Bebeto Gonzalez DPM    Physician Orders: Eval and Treat  Medical Diagnosis: S/P bunionectomy    Visit # / Visits Authorized:  3 / 10  Insurance Authorization Period: 4/21/2025 to 12/31/2025  Date of Evaluation: 04/17/2025  Plan of Care Certification: 04/17/2025 to 06/19/2025     PT/PTA:     Number of PTA visits since last PT visit:   Time In: 1400   Time Out: 1454  Total Time: 54   Total Billable Time: 54    FOTO:  Intake Score:  %  Survey Score 1:  %  Survey Score 2:  %         Subjective   He saw his MD this morning and everything went well. He wants his motion to be improved more..  Pain reported as 0/10.      Objective            Treatment:  Therapeutic Exercise  TE 1: Great toe stretch with towel 3 x 30 seconds B  TE 2: Ankle dorsiflexion stretch, 3 x 30 seconds  Manual Therapy  MT 1: Talocrural AP grade I-III  MT 2: Great toe mobilization grade I-II  Balance/Neuromuscular Re-Education  NMR 1: Lateral walking with green theraband, 2 x 15 yds  NMR 2: reverse lunge, 3 x 8  Therapeutic Activity  TA 1: Sled pushing to emphasize ankle motion (alt prowler and normal, 2x 20 yds )  TA 2: Double leg leg press, 50#, 3 x 10 / Single leg leg press, 25#, 3 x 8    Time Entry(in minutes):  Manual Therapy Time Entry: 9  Neuromuscular Re-Education Time Entry: 17  Therapeutic Activity Time Entry: 20  Therapeutic Exercise Time Entry: 8    Assessment & Plan   Assessment: Kevin completed therapy with no complications. Treatment continues to focus on developing his great toe motion and ankel mobility. Strengthening remained consistent in attempting to develop his functional strength. He noted increased fatigue, but demonstrated improved motion  at the conclusion of PT. Treatment will advance as tolerated.  Evaluation/Treatment Tolerance: Patient tolerated treatment well    Patient will continue to benefit from skilled outpatient physical therapy to address the deficits listed in the problem list box on initial evaluation, provide pt/family education and to maximize pt's level of independence in the home and community environment.     Patient's spiritual, cultural, and educational needs considered and patient agreeable to plan of care and goals.           Plan: Continue per established POC.    Goals:   Active       Long Term Goals        8 weeks  (Progressing)       Start:  04/23/25    Expected End:  06/19/25         The patient will improve their FOTO Score by at least 15% as evidence of clinically significant improvements in their function.     Patient will increase foot complex strength to at least a 4+/5 to improve their ability to complete transfers and walking task.    Patient will increase foot and ankle ROM (dorsiflexion and great toe extension) by at least 50% order to improve their ability to complete transfers and ambulation and work related activities.     Patient will demonstrate independence in home program for support of progression             Short Term Goals       4 weeks  (Progressing)       Start:  04/23/25    Expected End:  05/22/25       Pt will demonstrate independence with initial HEP to improve their functional mobility    The patient will increase hip complex strength to at least a 4-/5 to improve their ability to complete transfers and work related task    Patient will report pain of 0/10 demonstrating a reduction of overall pain.             Contreras Roblero, PT, DPT

## 2025-05-08 NOTE — LETTER
May 8, 2025      [facility] - Podiatry  [address] [facility] [address]  [address]  [place] [address]  Phone: [phone]  Fax: [phone]       Patient: Kevin Ca   YOB: 1970  Date of Visit: 05/08/2025    To Whom It May Concern:    Maddie Ca  was at [facility] on 05/08/2025. The patient may return to work  on 7/1/2025 with no restrictions. If you have any questions or concerns, or if I can be of further assistance, please do not hesitate to contact me.    Sincerely,

## 2025-05-08 NOTE — PROGRESS NOTES
"    [address] [facility] [address] [place] [address]  Phone: [phone]   [phone]    Patient's PCP:Elliott Robert MD  Referring Provider:No ref. provider found    Subjective:      Chief Complaint: Follow-up (S/P bunionectomy LT)    Date of Surgery: 01/17/2025  Procedure: BUNIONECTOMY, LAPIDUS (Left)/Resection of 1st metartarsal head medial eminence (Left) /1st MTPJ Capsule tendon balancing (Left) /2nd and 3rd metarsal angular correction with osteotomy and fusion of 2nd and 3rd TMTJ (Left) /Akin Osteotomy 1st proximal phalanx (Left) /OSTEOTOMY, FOOT (Left)/REMOVAL, HARDWARE, FOOT     Follow-up  Pertinent negatives include no abdominal pain, arthralgias, chest pain, chills, coughing, fatigue, fever, headaches, joint swelling, myalgias, nausea, neck pain, numbness, rash or weakness.     Kevin Ca Jr. is a 54 y.o. male who presents today for follow up of S/P bunionectomy, LT (1/17/2025). PT states he is going to physical therapy once per week which is helping. PT presents today in normal shoes. PT rates his pain as a 2/10 on the pain scale. Still getting intermittent shooting sensation in the left foot, same as he had previously in the right foot after surgery now resolved with steroid injection and PT for the RLE. Taking Lyrica PRN for nerve sensations. Feels like he has made significant progress since surgery but still feel deconditioned and occasional improved swelling.       Vitals:    05/08/25 0817   Weight: 106.4 kg (234 lb 9.1 oz)   Height: 5' 9" (1.753 m)   PainSc:   2        Past Surgical History:   Procedure Laterality Date    ARTHROSCOPY OF KNEE Right 07/16/2019    Procedure: ARTHROSCOPY, KNEE;  Surgeon: Ganesh Berry MD;  Location: [facility] OR;  Service: Orthopedics;  Laterality: Right;    BUNIONECTOMY Right 08/16/2024    Procedure: BUNIONECTOMY;  Surgeon: Bebeto Gonzalez DPM;  Location: [facility] OR;  Service: Podiatry;  Laterality: Right;  [place]irard notified  8/15    CORRECTION OF HAMMER " TOE Left 1/17/2025    Procedure: CORRECTION, HAMMER TOE;  Surgeon: Bebeto Gonzalez DPM;  Location: [facility] OR;  Service: Podiatry;  Laterality: Left;  With tendon and capsule balancing    FOOT HARDWARE REMOVAL Right 1/17/2025    Procedure: REMOVAL, HARDWARE, FOOT;  Surgeon: Bebeto Gonzalez DPM;  Location: [facility] OR;  Service: Podiatry;  Laterality: Right;    FOOT OSTEOTOMY Left 1/17/2025    Procedure: OSTEOTOMY, FOOT;  Surgeon: Bebeto Gonzalez DPM;  Location: [facility] OR;  Service: Podiatry;  Laterality: Left;    HAND TENDON SURGERY Left 1992    HERNIA REPAIR Right 04/12/2017    with mesh    LAPIDUS BUNIONECTOMY Left 1/17/2025    Procedure: BUNIONECTOMY, LAPIDUS;  Surgeon: Bebeto Gonzalez DPM;  Location: [facility] OR;  Service: Podiatry;  Laterality: Left;    MIDFOOT ARTHRODESIS Right 08/16/2024    Procedure: FUSION, JOINT, MIDFOOT;  Surgeon: Bebeto Gonzalez DPM;  Location: [facility] OR;  Service: Podiatry;  Laterality: Right;    RECONSTRUCTION WITH FUSION OF CHARCOT FOOT Left 1/17/2025    Procedure: RECONSTRUCTION, CHARCOT FOOT, WITH FUSION;  Surgeon: Bebeto Gonzalez DPM;  Location: [facility] OR;  Service: Podiatry;  Laterality: Left;    REPAIR OF MENISCUS OF KNEE Right 07/16/2019    Procedure: REPAIR, MENISCUS, KNEE;  Surgeon: Ganesh Berry MD;  Location: [facility];  Service: Orthopedics;  Laterality: Right;    right foot fracture  2005     Past Medical History:   Diagnosis Date    Arthritis     Back pain     GERD (gastroesophageal reflux disease)      Family History   Problem Relation Name Age of Onset    Cancer Maternal Grandfather          Social History:   Marital Status:   Alcohol History:  reports no history of alcohol use.  Tobacco History:  reports that he has never smoked. He has never used smokeless tobacco.  Drug History:  reports no history of drug use.    Review of patient's allergies indicates:  No Known Allergies    Current Medications[1]    Review of Systems   Constitutional:  Negative for chills, fatigue,  fever and unexpected weight change.   HENT:  Negative for hearing loss and trouble swallowing.    Eyes:  Negative for photophobia and visual disturbance.   Respiratory:  Negative for cough, shortness of breath and wheezing.    Cardiovascular:  Negative for chest pain, palpitations and leg swelling.   Gastrointestinal:  Negative for abdominal pain and nausea.   Genitourinary:  Negative for dysuria and frequency.   Musculoskeletal:  Negative for arthralgias, back pain, gait problem, joint swelling, myalgias and neck pain.   Skin:  Negative for color change, rash and wound.   Neurological:  Negative for tremors, seizures, speech difficulty, weakness, numbness and headaches.   Hematological:  Does not bruise/bleed easily.   Psychiatric/Behavioral:  Negative for hallucinations.          Objective:        Physical Exam:     PHYSICAL EXAM:    General: Patient is well-developed, well-nourished, in no apparent distress.    Psych: Alert and oriented. Mood and affect normal.    Respiratory: Breathing is regular and unlabored at rest, no evidence of respiratory distress.    HEENT: Hearing is intact to spoken word, no evidence of visual impairment identified that would impact self-care or ambulation, patient is verbal.    LOWER EXTREMITY EXAM    Vascular:   DP/PT pulses 2+ , skin is warm and well perfused, bilateral.    Digital capillary filling time is brisk, bilateral.    Mild/moderate/severe/no edema, bilateral.     Digital hair present bilateral    Neurologic:   Gross epicritic sensation intact to the foot without evidence of anesthesia or Tinel's sign, bilateral.     Dermatologic:   No skin lesions, ecchymosis or rash.     Incision sites healed well, discoloration fading     No erythema, No warmth, No edema, No Pain, No drainage, No fluctuance or crepitation, No Malodor     Lower leg to toes inspected and palpated for temperature, turgor and texture, found to be normal.     Musculoskeletal:   Overall structure of the foot  is cavus/planus.     Muscle strength graded at 5/5 in all 4 quadrants.    Peroneal tendons: Absent for tenderness, pain on resisted function and subluxation    Tibialis anterior tendon: Absent for tenderness and pain on resisted function    Tibialis Posterior: Absent for tenderness and pain on resisted function    Achilles: Absent for tenderness (insertional and non-insertional) and for pain on resisted function.    Tenderness on palpation of 1st, 2nd, and 3rd TMTJ 1/10 discomfort     Left- Smooth, painless ROM: 5 degrees dorsiflexion available from neutral position with the knee extended and flexed. Stable to talar tilt, anterior-drawer and syndesmosis stress tests.    Left- ROM 20 degrees of inversion, 10 degrees eversion. Absent for crepitation.    Left- ROM 6mm dorsiflexion, 4mm plantarflexion. Absent for crepitation.    Left- ROM 45 degrees dorsiflexion, 30 degrees plantarflexion    Gait Exam: Patient visualized ambulating down the hallway without shoes. Minor early heel off on left.     Imaging: None         Assessment:       1. S/P bunionectomy    2. Hallux valgus of left foot    3. Metatarsus adductus of left foot    4. Left foot pain    5. Post-operative state      Plan:   S/P bunionectomy    Hallux valgus of left foot    Metatarsus adductus of left foot    Left foot pain    Post-operative state    Xray Left foot obtained with no shift in positioning, hardware failure/loosening, no fracture/dislocation, fusion sites are greater than 60% consolidated on last xray  consolidated.   WBAT in CAM Boot, Can begin transition into supportive athletic shoe gear with OTC inserts as tolerated   Continue AJ and MTPJ ROM stretches and exercises   Continue compression   Continue Physical therapy, increased AJ ROM and 1st MPTJ ROM, ideally another 5 degrees of ankle dorsiflexion, another 15 degrees of 1st MTPJ extension to match the contralateral at 60 is the goal. Also break up scar tissue in PT to left foot incision  sites.   No dressings needed  Massage Vitamin E oil and Maderma into surgical scars b/l foot to improve appearance.   Follow up in 5-6 weeks, repeat left foot xray and likely discharge for left foot July 1, 2025, return to work updated.     Bebeto Gonzalez DPM  Podiatry / Foot and Ankle Surgery   Secure chat preferred          [1]   Current Outpatient Medications   Medication Sig Dispense Refill    omega-3 fatty acids/fish oil (FISH OIL-OMEGA-3 FATTY ACIDS) 300-1,000 mg capsule Take 1 capsule by mouth once daily.      omeprazole (PRILOSEC) 40 MG capsule Take 1 capsule (40 mg total) by mouth once daily. 90 capsule 3    oxyCODONE-acetaminophen (PERCOCET)  mg per tablet Take 1 tablet by mouth every 6 (six) hours as needed for Pain. 27 tablet 0    pregabalin (LYRICA) 50 MG capsule Take 2 capsules (100 mg total) by mouth 2 (two) times daily. 60 capsule 6     No current facility-administered medications for this visit.

## 2025-05-15 ENCOUNTER — CLINICAL SUPPORT (OUTPATIENT)
Dept: REHABILITATION | Facility: HOSPITAL | Age: 55
End: 2025-05-15
Payer: COMMERCIAL

## 2025-05-15 DIAGNOSIS — M25.672 DECREASED RANGE OF MOTION OF LEFT ANKLE: Primary | ICD-10-CM

## 2025-05-15 DIAGNOSIS — R29.898 ANKLE WEAKNESS: ICD-10-CM

## 2025-05-15 PROCEDURE — 97140 MANUAL THERAPY 1/> REGIONS: CPT | Mod: PN

## 2025-05-15 PROCEDURE — 97530 THERAPEUTIC ACTIVITIES: CPT | Mod: PN

## 2025-05-15 PROCEDURE — 97112 NEUROMUSCULAR REEDUCATION: CPT | Mod: PN

## 2025-05-15 NOTE — PROGRESS NOTES
Outpatient Rehab    Physical Therapy Progress Note    Patient Name: Kevin Ca Jr.  MRN: [id number]  YOB: 1970  Encounter Date: 5/15/2025    Therapy Diagnosis:   Encounter Diagnoses   Name Primary?    Decreased range of motion of left ankle Yes    Ankle weakness      Physician: Bebeto Gonzalez DPM    Physician Orders: Eval and Treat  Medical Diagnosis: S/P bunionectomy    Visit # / Visits Authorized:  4 / 10  Insurance Authorization Period: 4/21/2025 to 12/31/2025  Date of Evaluation: 4/17/2025  Plan of Care Certification: 4/17/2025 to 6/19/2025      PT/PTA:     Number of PTA visits since last PT visit:   Time In: 1400   Time Out: 1456  Total Time (in minutes): 56   Total Billable Time (in minutes): 56    FOTO:  Intake Score:  %  Survey Score 2:  %  Survey Score 3:  %    Precautions:       Subjective   He is doing well at this time..  Pain reported as 0/10.      Objective       Ankle/Foot Range of Motion      Symmetrical and pain free ankle dorsiflexion and great toe extension           Treatment:  Therapeutic Exercise  TE 1: Great toe stretch with towel 15 x 3 second holds B  TE 2: Ankle dorsiflexion stretch, 3 x 30 seconds  Manual Therapy  MT 1: Talocrural AP grade I-III  MT 2: Great toe mobilization grade I-II  MT 3: talocrural distraction grade I-III  Balance/Neuromuscular Re-Education  NMR 1: Lateral walking with green theraband, 2 x 15 yds  NMR 2: reverse lunge, 3 x 8  NMR 3: lateral walking on forefoot, at edge of table, down and back x4  NMR 4: seated heel raise, 30#, 3 x 8 B  Therapeutic Activity  TA 1: Sled pushing to emphasize ankle motion (alt prowler and normal, 2x 20 yds )  TA 3: DL heel raise, 2 x 10    Time Entry(in minutes):  Manual Therapy Time Entry: 12  Neuromuscular Re-Education Time Entry: 24  Therapeutic Activity Time Entry: 13  Therapeutic Exercise Time Entry: 8    Assessment & Plan   Assessment: Kevin completed therapy with no complications. Treatment focused on  advancing his foot and ankle interventions. Kevin responded well, demonstrating improved strength with DL heel raises and increasing the resistance on his seated foot and ankle interventions. Treatment will advance as tolerated.  Evaluation/Treatment Tolerance: Patient tolerated treatment well    Patient will continue to benefit from skilled outpatient physical therapy to address the deficits listed in the problem list box on initial evaluation, provide pt/family education and to maximize pt's level of independence in the home and community environment.     Patient's spiritual, cultural, and educational needs considered and patient agreeable to plan of care and goals.           Plan: Continue per established POC.    Goals:   Active       Long Term Goals        8 weeks  (Progressing)       Start:  04/23/25    Expected End:  06/19/25         The patient will improve their FOTO Score by at least 15% as evidence of clinically significant improvements in their function.     Patient will increase foot complex strength to at least a 4+/5 to improve their ability to complete transfers and walking task.    Patient will increase foot and ankle ROM (dorsiflexion and great toe extension) by at least 50% order to improve their ability to complete transfers and ambulation and work related activities.     Patient will demonstrate independence in home program for support of progression             Short Term Goals       4 weeks  (Progressing)       Start:  04/23/25    Expected End:  05/22/25       Pt will demonstrate independence with initial HEP to improve their functional mobility    The patient will increase hip complex strength to at least a 4-/5 to improve their ability to complete transfers and work related task    Patient will report pain of 0/10 demonstrating a reduction of overall pain.             Contreras Roblero, PT, DPT

## 2025-05-22 ENCOUNTER — CLINICAL SUPPORT (OUTPATIENT)
Dept: REHABILITATION | Facility: HOSPITAL | Age: 55
End: 2025-05-22
Payer: COMMERCIAL

## 2025-05-22 DIAGNOSIS — M25.672 DECREASED RANGE OF MOTION OF LEFT ANKLE: Primary | ICD-10-CM

## 2025-05-22 DIAGNOSIS — R29.898 ANKLE WEAKNESS: ICD-10-CM

## 2025-05-22 PROCEDURE — 97530 THERAPEUTIC ACTIVITIES: CPT | Mod: PN,CQ

## 2025-05-22 PROCEDURE — 97112 NEUROMUSCULAR REEDUCATION: CPT | Mod: PN,CQ

## 2025-05-22 PROCEDURE — 97140 MANUAL THERAPY 1/> REGIONS: CPT | Mod: PN,CQ

## 2025-05-22 NOTE — PROGRESS NOTES
Outpatient Rehab    Physical Therapy Progress Note    Patient Name: Kevin Ca Jr.  MRN: [id number]  YOB: 1970  Encounter Date: 5/22/2025    Therapy Diagnosis:   Encounter Diagnoses   Name Primary?    Decreased range of motion of left ankle Yes    Ankle weakness      Physician: Bebeto Gonzalez DPM    Physician Orders: Eval and Treat  Medical Diagnosis: S/P bunionectomy    Visit # / Visits Authorized:  5 / 10  Insurance Authorization Period: 4/21/2025 to 12/31/2025  Date of Evaluation: 4/17/2025  Plan of Care Certification: 4/17/2025 to 6/19/2025      PT/PTA:     Number of PTA visits since last PT visit:   Time In: 1400   Time Out: 1500  Total Time (in minutes): 60   Total Billable Time (in minutes): 53      Physical Therapy technician assisted with treatment under direct supervision of treating therapist as needed.       FOTO:  Intake Score:  %  Survey Score 2:  %  Survey Score 3:  %    Precautions:       Subjective   Increased swelling in his feet with recent travels..  Pain reported as 0/10.      Objective              Treatment:  Therapeutic Exercise  TE 1: Great toe stretch with towel 15 x 3 second holds B  TE 2: Ankle dorsiflexion stretch, 3 x 30 seconds  Manual Therapy  MT 1: Talocrural AP grade I-III  MT 2: Great toe mobilization grade I-II  MT 3: talocrural distraction grade I-III  Balance/Neuromuscular Re-Education  NMR 1: Lateral walking with green theraband, 2 x 15 yds  NMR 2: reverse lunge, 3 x 8  NMR 3: lateral walking on forefoot, at edge of table, down and back x4  NMR 4: seated heel raise, 30#, 3 x 8 B  NMR 5: Towel scrunch with dorsiflexion, x5 B / Toe yoga, 2 minutes B  Therapeutic Activity  TA 1: Sled pushing to emphasize ankle motion (alt prowler and normal, 2x 20 yds )  TA 2: Double leg leg press, 50#, 3 x 10 / Single leg leg press, 25#, 3 x 8  TA 3: DL heel raise, 2 x 10  TA 4: sled push 3 x 10 yards; 1x10 yards on balls of feet/heels lifted  TA 5: reverse lunges at window 3  x 10 reps    Time Entry(in minutes):  Manual Therapy Time Entry: 12  Neuromuscular Re-Education Time Entry: 24  Therapeutic Activity Time Entry: 13  Therapeutic Exercise Time Entry: 8    Assessment & Plan   Assessment: Kevin noted with good tolerance to tx. Continued foucs on improving foot and ankle strength. Will continue to benefit from skilled PT to maximize gains as able to allow him to return to prolonged activities without pain or limitations.       Patient will continue to benefit from skilled outpatient physical therapy to address the deficits listed in the problem list box on initial evaluation, provide pt/family education and to maximize pt's level of independence in the home and community environment.     Patient's spiritual, cultural, and educational needs considered and patient agreeable to plan of care and goals.           Plan: Continue per established POC.    Goals:   Active       Long Term Goals        8 weeks  (Progressing)       Start:  04/23/25    Expected End:  06/19/25         The patient will improve their FOTO Score by at least 15% as evidence of clinically significant improvements in their function.     Patient will increase foot complex strength to at least a 4+/5 to improve their ability to complete transfers and walking task.    Patient will increase foot and ankle ROM (dorsiflexion and great toe extension) by at least 50% order to improve their ability to complete transfers and ambulation and work related activities.     Patient will demonstrate independence in home program for support of progression             Short Term Goals       4 weeks  (Progressing)       Start:  04/23/25    Expected End:  05/22/25       Pt will demonstrate independence with initial HEP to improve their functional mobility    The patient will increase hip complex strength to at least a 4-/5 to improve their ability to complete transfers and work related task    Patient will report pain of 0/10 demonstrating a  reduction of overall pain.             Maribel Lorenz, PTA

## 2025-05-29 ENCOUNTER — CLINICAL SUPPORT (OUTPATIENT)
Dept: REHABILITATION | Facility: HOSPITAL | Age: 55
End: 2025-05-29
Payer: COMMERCIAL

## 2025-05-29 DIAGNOSIS — M25.672 DECREASED RANGE OF MOTION OF LEFT ANKLE: Primary | ICD-10-CM

## 2025-05-29 DIAGNOSIS — R29.898 ANKLE WEAKNESS: ICD-10-CM

## 2025-05-29 PROCEDURE — 97110 THERAPEUTIC EXERCISES: CPT | Mod: PN

## 2025-05-29 PROCEDURE — 97530 THERAPEUTIC ACTIVITIES: CPT | Mod: PN

## 2025-05-29 PROCEDURE — 97112 NEUROMUSCULAR REEDUCATION: CPT | Mod: PN

## 2025-05-29 PROCEDURE — 97140 MANUAL THERAPY 1/> REGIONS: CPT | Mod: PN

## 2025-05-29 NOTE — PROGRESS NOTES
Outpatient Rehab    Physical Therapy Progress Note    Patient Name: Kevin Ca Jr.  MRN: [id number]  YOB: 1970  Encounter Date: 5/29/2025    Therapy Diagnosis:   Encounter Diagnoses   Name Primary?    Decreased range of motion of left ankle Yes    Ankle weakness      Physician: Bebeto Gonzalez DPM    Physician Orders: Eval and Treat  Medical Diagnosis: S/P bunionectomy    Visit # / Visits Authorized:  6 / 10  Insurance Authorization Period: 4/21/2025 to 12/31/2025  Date of Evaluation: 4/17/2025  Plan of Care Certification: 4/17/2025 to 6/19/2025      PT/PTA:     Number of PTA visits since last PT visit:   Time In: 1400   Time Out: 1448  Total Time (in minutes): 48   Total Billable Time (in minutes): 48    FOTO:  Intake Score:  %  Survey Score 2:  %  Survey Score 3:  %    Precautions:       Subjective   He has been doing well, his toes are moving well..  Pain reported as 0/10.      Objective              Treatment:  Therapeutic Exercise  TE 1: Great toe stretch with towel 15 x 5 second holds B  TE 2: Ankle dorsiflexion stretch, 3 x 30 seconds  TE 3: foot and ankle assessment  Manual Therapy  MT 1: Talocrural AP grade I-III  MT 2: Great toe mobilization grade I-II  Balance/Neuromuscular Re-Education  NMR 1: Single leg squat with contralateral hip extension for toe extension 2x 10 B  NMR 2: Prowler forward lunge 2 x 5 B  Therapeutic Activity  TA 1: Sled pushing to emphasize ankle motion 30# (alt prowler and normal, 2x 15 yds )  TA 2: Hopping on shuttle 12#, x3 minutes  TA 3: DL heel raise, 3 x 10  TA 4: Lateral walking with green theraband, 2 x 15 yds   Lateral walking on forefoot x5 at windowsill    Time Entry(in minutes):  Manual Therapy Time Entry: 8  Neuromuscular Re-Education Time Entry: 9  Therapeutic Activity Time Entry: 23  Therapeutic Exercise Time Entry: 8    Assessment & Plan   Assessment: Kevin completed therapy with no complications; treatment further emphasized toe extension and added  plyometrics. Kevin responded well, noting no pain or discomfort and demonstrated improved foot and ankle motion. Treatment will continue to progress the load to his foot and ankle and look to advance to more work related task as he continues to get stronger.  Evaluation/Treatment Tolerance: Patient tolerated treatment well    Patient will continue to benefit from skilled outpatient physical therapy to address the deficits listed in the problem list box on initial evaluation, provide pt/family education and to maximize pt's level of independence in the home and community environment.     Patient's spiritual, cultural, and educational needs considered and patient agreeable to plan of care and goals.           Plan: Continue per established POC.    Goals:   Active       Long Term Goals        8 weeks  (Progressing)       Start:  04/23/25    Expected End:  06/19/25         The patient will improve their FOTO Score by at least 15% as evidence of clinically significant improvements in their function.     Patient will increase foot complex strength to at least a 4+/5 to improve their ability to complete transfers and walking task.    Patient will increase foot and ankle ROM (dorsiflexion and great toe extension) by at least 50% order to improve their ability to complete transfers and ambulation and work related activities.     Patient will demonstrate independence in home program for support of progression             Short Term Goals       4 weeks  (Progressing)       Start:  04/23/25    Expected End:  05/22/25       Pt will demonstrate independence with initial HEP to improve their functional mobility    The patient will increase hip complex strength to at least a 4-/5 to improve their ability to complete transfers and work related task    Patient will report pain of 0/10 demonstrating a reduction of overall pain.             Contreras Roblero, PT, DPT

## 2025-06-05 ENCOUNTER — CLINICAL SUPPORT (OUTPATIENT)
Dept: REHABILITATION | Facility: HOSPITAL | Age: 55
End: 2025-06-05
Payer: COMMERCIAL

## 2025-06-05 DIAGNOSIS — R29.898 ANKLE WEAKNESS: ICD-10-CM

## 2025-06-05 DIAGNOSIS — M25.672 DECREASED RANGE OF MOTION OF LEFT ANKLE: Primary | ICD-10-CM

## 2025-06-05 PROCEDURE — 97530 THERAPEUTIC ACTIVITIES: CPT | Mod: PN,CQ

## 2025-06-05 PROCEDURE — 97110 THERAPEUTIC EXERCISES: CPT | Mod: PN,CQ

## 2025-06-05 PROCEDURE — 97112 NEUROMUSCULAR REEDUCATION: CPT | Mod: PN,CQ

## 2025-06-10 ENCOUNTER — OCCUPATIONAL HEALTH (OUTPATIENT)
Dept: URGENT CARE | Facility: CLINIC | Age: 55
End: 2025-06-10

## 2025-06-10 DIAGNOSIS — Z00.00 PHYSICAL EXAM, ANNUAL: Primary | ICD-10-CM

## 2025-06-10 PROCEDURE — 84153 ASSAY OF PSA TOTAL: CPT | Mod: S$GLB,,,

## 2025-06-10 PROCEDURE — 99499 UNLISTED E&M SERVICE: CPT | Mod: S$GLB,,,

## 2025-06-10 PROCEDURE — 82270 OCCULT BLOOD FECES: CPT | Mod: S$GLB,,,

## 2025-06-10 PROCEDURE — 83036 HEMOGLOBIN GLYCOSYLATED A1C: CPT | Mod: QW,S$GLB,,

## 2025-06-11 LAB
ALBUMIN SERPL-MCNC: 4.7 G/DL (ref 3.8–4.9)
ALP SERPL-CCNC: 145 IU/L (ref 44–121)
ALT SERPL-CCNC: 28 IU/L (ref 0–44)
AST SERPL-CCNC: 29 IU/L (ref 0–40)
BASOPHILS # BLD AUTO: 0 X10E3/UL (ref 0–0.2)
BASOPHILS NFR BLD AUTO: 1 %
BILIRUB SERPL-MCNC: 0.9 MG/DL (ref 0–1.2)
BUN SERPL-MCNC: 10 MG/DL (ref 6–24)
BUN/CREAT SERPL: 11 (ref 9–20)
CALCIUM SERPL-MCNC: 9.8 MG/DL (ref 8.7–10.2)
CHLORIDE SERPL-SCNC: 104 MMOL/L (ref 96–106)
CHOLEST SERPL-MCNC: 220 MG/DL (ref 100–199)
CO2 SERPL-SCNC: 23 MMOL/L (ref 20–29)
CREAT SERPL-MCNC: 0.91 MG/DL (ref 0.76–1.27)
EOSINOPHIL # BLD AUTO: 0.2 X10E3/UL (ref 0–0.4)
EOSINOPHIL NFR BLD AUTO: 3 %
ERYTHROCYTE [DISTWIDTH] IN BLOOD BY AUTOMATED COUNT: 13.5 % (ref 11.6–15.4)
EST. GFR  (NO RACE VARIABLE): 99 ML/MIN/1.73
GLOBULIN SER CALC-MCNC: 2.6 G/DL (ref 1.5–4.5)
GLUCOSE SERPL-MCNC: 98 MG/DL (ref 70–99)
HBA1C MFR BLD: 6 % (ref 4.8–5.6)
HCT VFR BLD AUTO: 43.4 % (ref 37.5–51)
HDLC SERPL-MCNC: 84 MG/DL
HGB BLD-MCNC: 13.8 G/DL (ref 13–17.7)
HIV 1+2 AB+HIV1 P24 AG SERPL QL IA: NON REACTIVE
IMM GRANULOCYTES # BLD AUTO: 0 X10E3/UL (ref 0–0.1)
IMM GRANULOCYTES NFR BLD AUTO: 0 %
LDLC SERPL CALC-MCNC: 125 MG/DL (ref 0–99)
LYMPHOCYTES # BLD AUTO: 2.3 X10E3/UL (ref 0.7–3.1)
LYMPHOCYTES NFR BLD AUTO: 42 %
MCH RBC QN AUTO: 29.6 PG (ref 26.6–33)
MCHC RBC AUTO-ENTMCNC: 31.8 G/DL (ref 31.5–35.7)
MCV RBC AUTO: 93 FL (ref 79–97)
MONOCYTES # BLD AUTO: 0.4 X10E3/UL (ref 0.1–0.9)
MONOCYTES NFR BLD AUTO: 8 %
NEUTROPHILS # BLD AUTO: 2.5 X10E3/UL (ref 1.4–7)
NEUTROPHILS NFR BLD AUTO: 46 %
PLATELET # BLD AUTO: 273 X10E3/UL (ref 150–450)
POTASSIUM SERPL-SCNC: 4.6 MMOL/L (ref 3.5–5.2)
PROT SERPL-MCNC: 7.3 G/DL (ref 6–8.5)
PSA SERPL-MCNC: 1.4 NG/ML (ref 0–4)
RBC # BLD AUTO: 4.67 X10E6/UL (ref 4.14–5.8)
SODIUM SERPL-SCNC: 143 MMOL/L (ref 134–144)
TRIGL SERPL-MCNC: 64 MG/DL (ref 0–149)
VLDLC SERPL CALC-MCNC: 11 MG/DL (ref 5–40)
WBC # BLD AUTO: 5.4 X10E3/UL (ref 3.4–10.8)

## 2025-06-12 ENCOUNTER — CLINICAL SUPPORT (OUTPATIENT)
Dept: REHABILITATION | Facility: HOSPITAL | Age: 55
End: 2025-06-12
Payer: COMMERCIAL

## 2025-06-12 DIAGNOSIS — R29.898 ANKLE WEAKNESS: ICD-10-CM

## 2025-06-12 DIAGNOSIS — M25.672 DECREASED RANGE OF MOTION OF LEFT ANKLE: Primary | ICD-10-CM

## 2025-06-12 PROCEDURE — 97140 MANUAL THERAPY 1/> REGIONS: CPT | Mod: PN

## 2025-06-12 PROCEDURE — 97110 THERAPEUTIC EXERCISES: CPT | Mod: PN

## 2025-06-12 PROCEDURE — 97530 THERAPEUTIC ACTIVITIES: CPT | Mod: PN

## 2025-06-12 PROCEDURE — 97112 NEUROMUSCULAR REEDUCATION: CPT | Mod: PN

## 2025-06-12 NOTE — PROGRESS NOTES
Outpatient Rehab    Physical Therapy Progress Note    Patient Name: Kevin Ca Jr.  MRN: [id number]  YOB: 1970  Encounter Date: 6/12/2025    Therapy Diagnosis:   Encounter Diagnoses   Name Primary?    Decreased range of motion of left ankle Yes    Ankle weakness      Physician: Bebeto Gonzalez DPM    Physician Orders: Eval and Treat  Medical Diagnosis: S/P bunionectomy    Visit # / Visits Authorized:  8 / 10  Insurance Authorization Period: 4/21/2025 to 12/31/2025  Date of Evaluation: 4/17/2025  Plan of Care Certification: 4/17/2025 to 6/19/2025      PT/PTA:     Number of PTA visits since last PT visit:   Time In: 1400   Time Out: 1455  Total Time (in minutes): 55   Total Billable Time (in minutes): 55      Physical Therapy technician assisted with treatment under direct supervision of treating therapist as needed.     FOTO:  Intake Score:  %  Survey Score 2:  %  Survey Score 3:  %    Precautions:       Subjective   He is doing well and is okay with doing once a week and ending with his last two visits..  Pain reported as 0/10.      Objective              Treatment:  Therapeutic Exercise  TE 1: Great toe stretch with towel 15 x 5 second holds B  TE 2: Ankle dorsiflexion stretch, 3 x 30 seconds  TE 3: Ellipitical x 8 minutes  Manual Therapy  MT 1: Talocrural AP grade I-III  MT 3: talocrural distraction grade I-III  Balance/Neuromuscular Re-Education  NMR 1: Single leg squat with contralateral hip extension for toe extension 2x 10 B  NMR 2: Prowler forward lunge 2 x 5 B  NMR 3: lateral walking on forefoot, at windowsill, down and back x2  Therapeutic Activity  TA 1: Sled pushing and pulling  50# (normal, 2x 20yds )  TA 2: lateral walking on treadmill, x2 mins B  TA 3: DL heel raise, 3 x 10      Time Entry(in minutes):  Manual Therapy Time Entry: 9  Neuromuscular Re-Education Time Entry: 14  Therapeutic Activity Time Entry: 13  Therapeutic Exercise Time Entry: 19    Assessment & Plan   Assessment:  Kevin continues to do well at this time; he has no complaints of mobility defiticts, he just lacks the tolerance to excessive lateral motions. Treatment has been able to increase the load to his great toe and hips with no reports of pain. Treatment will look to expand his HEP and educate him on managing his symptoms post therapy.  Evaluation/Treatment Tolerance: Patient tolerated treatment well    Patient will continue to benefit from skilled outpatient physical therapy to address the deficits listed in the problem list box on initial evaluation, provide pt/family education and to maximize pt's level of independence in the home and community environment.     Patient's spiritual, cultural, and educational needs considered and patient agreeable to plan of care and goals.           Plan: Prepare for D/C from outpatient PT    Goals:   Active       Long Term Goals        8 weeks  (Progressing)       Start:  04/23/25    Expected End:  06/19/25         The patient will improve their FOTO Score by at least 15% as evidence of clinically significant improvements in their function.     Patient will increase foot complex strength to at least a 4+/5 to improve their ability to complete transfers and walking task.    Patient will increase foot and ankle ROM (dorsiflexion and great toe extension) by at least 50% order to improve their ability to complete transfers and ambulation and work related activities.     Patient will demonstrate independence in home program for support of progression             Short Term Goals       4 weeks  (Progressing)       Start:  04/23/25    Expected End:  05/22/25       Pt will demonstrate independence with initial HEP to improve their functional mobility    The patient will increase hip complex strength to at least a 4-/5 to improve their ability to complete transfers and work related task    Patient will report pain of 0/10 demonstrating a reduction of overall pain.             Contreras Roblero,  PT, DPT

## 2025-06-18 ENCOUNTER — HOSPITAL ENCOUNTER (OUTPATIENT)
Dept: RADIOLOGY | Facility: CLINIC | Age: 55
Discharge: HOME | End: 2025-06-18
Payer: COMMERCIAL

## 2025-06-18 ENCOUNTER — OFFICE VISIT (OUTPATIENT)
Dept: PODIATRY | Facility: CLINIC | Age: 55
End: 2025-06-18
Payer: COMMERCIAL

## 2025-06-18 VITALS — WEIGHT: 239.44 LBS | HEIGHT: 69 IN | BODY MASS INDEX: 35.46 KG/M2 | OXYGEN SATURATION: 99 %

## 2025-06-18 DIAGNOSIS — M79.672 LEFT FOOT PAIN: ICD-10-CM

## 2025-06-18 DIAGNOSIS — Q66.222 METATARSUS ADDUCTUS OF LEFT FOOT: ICD-10-CM

## 2025-06-18 DIAGNOSIS — Z98.890 S/P BUNIONECTOMY: Primary | ICD-10-CM

## 2025-06-18 DIAGNOSIS — M20.12 HALLUX VALGUS OF LEFT FOOT: ICD-10-CM

## 2025-06-18 PROCEDURE — 73630 X-RAY EXAM OF FOOT: CPT | Mod: LT,S$GLB,, | Performed by: RADIOLOGY

## 2025-06-18 PROCEDURE — 99999 PR PBB SHADOW E&M-EST. PATIENT-LVL III: CPT | Mod: PBBFAC,,,

## 2025-06-18 NOTE — PROGRESS NOTES
"    [address] [facility] [address] [place] [address]  Phone: [phone]   [phone]    Patient's PCP:Elliott Robert MD  Referring Provider:No ref. provider found    Subjective:      Chief Complaint: Follow-up (S/P bunionectomy LT)    Date of Surgery: 01/17/2025  Procedure: BUNIONECTOMY, LAPIDUS (Left)/Resection of 1st metartarsal head medial eminence (Left) /1st MTPJ Capsule tendon balancing (Left) /2nd and 3rd metarsal angular correction with osteotomy and fusion of 2nd and 3rd TMTJ (Left) /Akin Osteotomy 1st proximal phalanx (Left) /OSTEOTOMY, FOOT (Left)/REMOVAL, HARDWARE, FOOT     Follow-up  Pertinent negatives include no abdominal pain, arthralgias, chest pain, chills, coughing, fatigue, fever, headaches, joint swelling, myalgias, nausea, neck pain, numbness, rash or weakness.     Kevin Ca Jr. is a 54 y.o. male who presents today for follow up of S/P bunionectomy, LT (1/17/2025). PT states he is going to physical therapy once per week which is helping. PT presents today in normal shoes. PT rates his pain as a 2/10 on the pain scale. Still getting intermittent shooting sensation in the left foot, same as he had previously in the right foot after surgery now resolved with steroid injection and PT for the RLE. Taking Lyrica PRN for nerve sensations. Notes nerve sensations have just about completely resolved. Also mentions feels like 90% improvement in the left foot since starting physical therapy. The swelling, especially after a long day physical therapy has resolved to both surgical feet as well. He has two more session of physical therapy left.       Vitals:    06/18/25 0831   SpO2: 99%   Weight: 108.6 kg (239 lb 6.7 oz)   Height: 5' 9" (1.753 m)   PainSc: 0-No pain        Past Surgical History:   Procedure Laterality Date    ARTHROSCOPY OF KNEE Right 07/16/2019    Procedure: ARTHROSCOPY, KNEE;  Surgeon: Ganesh Berry MD;  Location: [facility];  Service: Orthopedics;  Laterality: Right; "    BUNIONECTOMY Right 08/16/2024    Procedure: BUNIONECTOMY;  Surgeon: Bebeto Gonzalez DPM;  Location: [facility] OR;  Service: Podiatry;  Laterality: Right;  [place] notified  8/15    CORRECTION OF HAMMER TOE Left 1/17/2025    Procedure: CORRECTION, HAMMER TOE;  Surgeon: Bebeto Gonzalez DPM;  Location: [facility] OR;  Service: Podiatry;  Laterality: Left;  With tendon and capsule balancing    FOOT HARDWARE REMOVAL Right 1/17/2025    Procedure: REMOVAL, HARDWARE, FOOT;  Surgeon: Bebeto Gonzalez DPM;  Location: [facility] OR;  Service: Podiatry;  Laterality: Right;    FOOT OSTEOTOMY Left 1/17/2025    Procedure: OSTEOTOMY, FOOT;  Surgeon: Bebeto Gonzalez DPM;  Location: [facility] OR;  Service: Podiatry;  Laterality: Left;    HAND TENDON SURGERY Left 1992    HERNIA REPAIR Right 04/12/2017    with mesh    LAPIDUS BUNIONECTOMY Left 1/17/2025    Procedure: BUNIONECTOMY, LAPIDUS;  Surgeon: Bebeto Gonzalez DPM;  Location: [facility] OR;  Service: Podiatry;  Laterality: Left;    MIDFOOT ARTHRODESIS Right 08/16/2024    Procedure: FUSION, JOINT, MIDFOOT;  Surgeon: Bebeto Gonzalez DPM;  Location: [facility] OR;  Service: Podiatry;  Laterality: Right;    RECONSTRUCTION WITH FUSION OF CHARCOT FOOT Left 1/17/2025    Procedure: RECONSTRUCTION, CHARCOT FOOT, WITH FUSION;  Surgeon: Bebeto Gonzalez DPM;  Location: [facility] OR;  Service: Podiatry;  Laterality: Left;    REPAIR OF MENISCUS OF KNEE Right 07/16/2019    Procedure: REPAIR, MENISCUS, KNEE;  Surgeon: Ganesh Berry MD;  Location: [facility] OR;  Service: Orthopedics;  Laterality: Right;    right foot fracture  2005     Past Medical History:   Diagnosis Date    Arthritis     Back pain     GERD (gastroesophageal reflux disease)      Family History   Problem Relation Name Age of Onset    Cancer Maternal Grandfather          Social History:   Marital Status:   Alcohol History:  reports no history of alcohol use.  Tobacco History:  reports that he has never smoked. He has never used smokeless tobacco.  Drug  History:  reports no history of drug use.    Review of patient's allergies indicates:  No Known Allergies    Current Medications[1]    Review of Systems   Constitutional:  Negative for chills, fatigue, fever and unexpected weight change.   HENT:  Negative for hearing loss and trouble swallowing.    Eyes:  Negative for photophobia and visual disturbance.   Respiratory:  Negative for cough, shortness of breath and wheezing.    Cardiovascular:  Negative for chest pain, palpitations and leg swelling.   Gastrointestinal:  Negative for abdominal pain and nausea.   Genitourinary:  Negative for dysuria and frequency.   Musculoskeletal:  Negative for arthralgias, back pain, gait problem, joint swelling, myalgias and neck pain.   Skin:  Negative for color change, rash and wound.   Neurological:  Negative for tremors, seizures, speech difficulty, weakness, numbness and headaches.   Hematological:  Does not bruise/bleed easily.   Psychiatric/Behavioral:  Negative for hallucinations.          Objective:        Physical Exam:     PHYSICAL EXAM:    General: Patient is well-developed, well-nourished, in no apparent distress.    Psych: Alert and oriented. Mood and affect normal.    Respiratory: Breathing is regular and unlabored at rest, no evidence of respiratory distress.    HEENT: Hearing is intact to spoken word, no evidence of visual impairment identified that would impact self-care or ambulation, patient is verbal.    LOWER EXTREMITY EXAM    Vascular:   DP/PT pulses 2+ , skin is warm and well perfused, bilateral.    Digital capillary filling time is brisk, bilateral.    Mild/moderate/severe/no edema, bilateral.     Digital hair present bilateral    Neurologic:   Gross epicritic sensation intact to the foot without evidence of anesthesia or Tinel's sign, bilateral.     Dermatologic:   No skin lesions, ecchymosis or rash.     Incision sites healed well, discoloration fading     No erythema, No warmth, No edema, No Pain, No  drainage, No fluctuance or crepitation, No Malodor     Lower leg to toes inspected and palpated for temperature, turgor and texture, found to be normal.     Musculoskeletal:   Overall structure of the foot is cavus/planus.     Muscle strength graded at 5/5 in all 4 quadrants.    Peroneal tendons: Absent for tenderness, pain on resisted function and subluxation    Tibialis anterior tendon: Absent for tenderness and pain on resisted function    Tibialis Posterior: Absent for tenderness and pain on resisted function    Achilles: Absent for tenderness (insertional and non-insertional) and for pain on resisted function.    Tenderness on palpation of 1st, 2nd, and 3rd TMTJ 1/10 discomfort     AJ Left- Smooth, painless ROM: 5 degrees dorsiflexion available from neutral position with the knee extended and flexed. Stable to talar tilt, anterior-drawer and syndesmosis stress tests.    Left- ROM 20 degrees of inversion, 10 degrees eversion. Absent for crepitation.    1st MTPJ Left- ROM 45 degrees dorsiflexion, 30 degrees plantarflexion    Gait Exam: Patient visualized ambulating down the hallway without shoes.     Imaging:    XR Results:  Results for orders placed in visit on 06/18/25    X-Ray Foot Complete Left    Narrative  EXAMINATION:  XR FOOT COMPLETE 3 VIEW LEFT    CLINICAL HISTORY:  .  Other specified postprocedural states    TECHNIQUE:  AP, lateral and oblique views of the left foot were performed.    COMPARISON:  Left foot x-ray of April 2, 2025    FINDINGS:  The patient has had prior fixation of the 1st 2nd and 3rd metatarsal cuneiform joints with cortical plates and multiple screws in position.  Patient has had an osteotomy of the proximal phalanx of the left big toe with a U shaped nail in place.  A small heel spur is noted.  No fractures or other osseous abnormalities are identified.    Impression  Prior arthrodesis of the 1st 2nd and 3rd metatarsal cuneiform joints with hardware in place.  Prior osteotomy of the  proximal phalanx of the left big toe      Electronically signed by: Oneil Ann MD  Date:    06/18/2025  Time:    08:55           Assessment:       1. S/P bunionectomy    2. Hallux valgus of left foot    3. Metatarsus adductus of left foot    4. Left foot pain      Plan:   S/P bunionectomy  -     X-Ray Foot Complete Left    Hallux valgus of left foot    Metatarsus adductus of left foot    Left foot pain  -     X-Ray Foot Complete Left    Xray Left foot obtained with no shift in positioning, hardware failure/loosening, no fracture/dislocation, fusion sites are greater than 70% consolidated, akin site consolidated   Cont. supportive athletic shoe gear with OTC inserts as tolerated   Continue AJ and MTPJ ROM stretches and exercises   Continue compression prn   Continue Physical therapy, increased AJ ROM and 1st MPTJ ROM, ideally another 5 degrees of ankle dorsiflexion, another 15 degrees of 1st MTPJ extension to match the contralateral at 60 is the goal. Also break up scar tissue in PT to left foot incision sites.   No dressings needed  Massage Vitamin E oil and Maderma into surgical scars b/l foot to improve appearance.   Return to work July 1, 2025, return to work updated.   Tentative plan to discharge for left foot surgery July 1st 2025.     Follow up PRN    Bebeto Gonzalez DPM  Podiatry / Foot and Ankle Surgery   Secure chat preferred              [1]   Current Outpatient Medications   Medication Sig Dispense Refill    omega-3 fatty acids/fish oil (FISH OIL-OMEGA-3 FATTY ACIDS) 300-1,000 mg capsule Take 1 capsule by mouth once daily.      omeprazole (PRILOSEC) 40 MG capsule Take 1 capsule (40 mg total) by mouth once daily. 90 capsule 3    oxyCODONE-acetaminophen (PERCOCET)  mg per tablet Take 1 tablet by mouth every 6 (six) hours as needed for Pain. 27 tablet 0    pregabalin (LYRICA) 50 MG capsule Take 2 capsules (100 mg total) by mouth 2 (two) times daily. 60 capsule 6     No current  facility-administered medications for this visit.

## 2025-06-26 ENCOUNTER — CLINICAL SUPPORT (OUTPATIENT)
Dept: REHABILITATION | Facility: HOSPITAL | Age: 55
End: 2025-06-26
Payer: COMMERCIAL

## 2025-06-26 DIAGNOSIS — M25.672 DECREASED RANGE OF MOTION OF LEFT ANKLE: Primary | ICD-10-CM

## 2025-06-26 DIAGNOSIS — R29.898 ANKLE WEAKNESS: ICD-10-CM

## 2025-06-26 PROCEDURE — 97110 THERAPEUTIC EXERCISES: CPT | Mod: PN

## 2025-06-26 PROCEDURE — 97530 THERAPEUTIC ACTIVITIES: CPT | Mod: PN

## 2025-06-26 PROCEDURE — 97112 NEUROMUSCULAR REEDUCATION: CPT | Mod: PN

## 2025-06-26 NOTE — PROGRESS NOTES
Outpatient Rehab    Physical Therapy Discharge    Patient Name: Kevin Ca Jr.  MRN: [id number]  YOB: 1970  Encounter Date: 6/26/2025    Therapy Diagnosis:   Encounter Diagnoses   Name Primary?    Decreased range of motion of left ankle Yes    Ankle weakness      Physician: Bebeto Gonzalez DPM    Physician Orders: Eval and Treat  Medical Diagnosis: S/P bunionectomy  Surgical Diagnosis: Z98.890 (ICD-10-CM) - S/P bunionectomy   Surgical Date: 1/17/2025  Days Since Last Surgery: 160    Visit # / Visits Authorized:  9 / 10  Insurance Authorization Period: 4/21/2025 to 12/31/2025  Date of Evaluation: 4/17/2025  Plan of Care Certification: 4/17/2025 to 6/19/2025      PT/PTA:     Number of PTA visits since last PT visit:   Time In: 1400   Time Out: 1435  Total Time (in minutes): 35   Total Billable Time (in minutes): 35    FOTO:  Intake Score:  %  Survey Score 2: 46%  Survey Score 3: 69%    Precautions:       Subjective   He is ready for his cap and gown! He went to his MD and is feeling good..  Pain reported as 0/10.      Objective       Ankle/Foot Range of Motion   Left Ankle/Foot   Active (deg) Passive (deg) Pain   Dorsiflexion (KE)         Dorsiflexion (KF)         Plantar Flexion         Ankle Inversion         Ankle Eversion         Subtalar Inversion         Subtalar Eversion         Great Toe MTP Flexion         Great Toe MTP Extension 18 35     Great Toe IP Flexion                         Treatment:  Therapeutic Exercise  TE 1: ankle assessment  Balance/Neuromuscular Re-Education  NMR 1: lateral walking with BTB, 3 x 10 yds  NMR 2: walking lunges, 3 x 10 yds  Therapeutic Activity  TA 1: jogging 2 x 20 yds (with progressive speed up)  TA 2: jumping rope, x2 mins    Time Entry(in minutes):  Neuromuscular Re-Education Time Entry: 14  Therapeutic Activity Time Entry: 13  Therapeutic Exercise Time Entry: 8    Assessment & Plan   Assessment: Kevin completed therapy with no complications; His foot  and ankle was assessed and he demonstrates symmetrical ankle motion and about 18 degrees great toe extension when he started at 5 degrees. He demonstrates improved capability to do dynamic task such as hopping, jogging and jumping rope. At this time he is D/C from outpatient Physical Therapy with a final HEP and education to follow up with his MD as necessary.   Evaluation/Treatment Tolerance: Patient tolerated treatment well    The patient's spiritual, cultural, and educational needs were considered, and the patient is agreeable to the plan of care and goals.           Plan: D/C from outpatient PT    Goals:   Active       Long Term Goals        8 weeks  (Progressing)       Start:  04/23/25    Expected End:  06/19/25         The patient will improve their FOTO Score by at least 15% as evidence of clinically significant improvements in their function.     Patient will increase foot complex strength to at least a 4+/5 to improve their ability to complete transfers and walking task.    Patient will increase foot and ankle ROM (dorsiflexion and great toe extension) by at least 50% order to improve their ability to complete transfers and ambulation and work related activities.     Patient will demonstrate independence in home program for support of progression             Short Term Goals       4 weeks  (Progressing)       Start:  04/23/25    Expected End:  05/22/25       Pt will demonstrate independence with initial HEP to improve their functional mobility    The patient will increase hip complex strength to at least a 4-/5 to improve their ability to complete transfers and work related task    Patient will report pain of 0/10 demonstrating a reduction of overall pain.             Contreras Roblero, PT, DPT

## 2025-07-07 DIAGNOSIS — M79.671 RIGHT FOOT PAIN: ICD-10-CM

## 2025-07-14 RX ORDER — PREGABALIN 50 MG/1
100 CAPSULE ORAL 2 TIMES DAILY
Qty: 60 CAPSULE | Refills: 6 | Status: SHIPPED | OUTPATIENT
Start: 2025-07-14 | End: 2026-01-12

## 2025-07-16 ENCOUNTER — PATIENT MESSAGE (OUTPATIENT)
Dept: ADMINISTRATIVE | Facility: OTHER | Age: 55
End: 2025-07-16
Payer: COMMERCIAL

## (undated) DEVICE — PUSHER NOVOCUT SUTURE MANAGER

## (undated) DEVICE — TRAY LOWER EXTREMITY SMH

## (undated) DEVICE — GOWN B1 X-LG X-LONG

## (undated) DEVICE — DRAPE C ARM 42 X 120 10/BX

## (undated) DEVICE — CUFF TOURNIQUET STER DIS 34

## (undated) DEVICE — SCALPEL SURG SPEEDRELEASE GUI

## (undated) DEVICE — SOL IRR NACL .9% 3000ML

## (undated) DEVICE — PACK ARTHROSCOPY W/ISO BAC

## (undated) DEVICE — KIT ARTHREX ANGEL PRP

## (undated) DEVICE — DRAPE C-ARMOR EQUIPMENT COVER

## (undated) DEVICE — SUT PDS II 0 CT-1 VIL MONO

## (undated) DEVICE — GLOVE SENSICARE PI SLT 7.5

## (undated) DEVICE — BANDAGE ESMARK ELASTIC ST 4X9

## (undated) DEVICE — COVER CAMERA OPERATING ROOM

## (undated) DEVICE — GLOVE PI ULTRA TOUCH G SURGEON

## (undated) DEVICE — TOURNIQUET SB QC DP 18X4IN

## (undated) DEVICE — MAT QUICK 40X30 FLOOR FLUID LF

## (undated) DEVICE — SOL NACL IRR 1000ML BTL

## (undated) DEVICE — SEE MEDLINE ITEM 157116

## (undated) DEVICE — SEE MEDLINE ITEM 146313

## (undated) DEVICE — SEE MEDLINE ITEM 152622

## (undated) DEVICE — PAD ABD 8X10 STERILE

## (undated) DEVICE — BLADE SURG CARBON STEEL SZ11

## (undated) DEVICE — GLOVE SURG ULTRA TOUCH 6

## (undated) DEVICE — NDL SPINAL 18GX3.5 SPINOCAN

## (undated) DEVICE — SPONGE GZ WOVEN 12-PLY 4X4IN

## (undated) DEVICE — SUT ETHICON 3-0 BLK MONO PS

## (undated) DEVICE — BLADE SAW 16.0MM X 7.0MM

## (undated) DEVICE — SHOE POST-OP VEL CLOS M/MD

## (undated) DEVICE — COVER SURG LIGHT HANDLE

## (undated) DEVICE — BLADE OSC & SAGITTAL 9.0MM

## (undated) DEVICE — SEE MEDLINE ITEM 152530

## (undated) DEVICE — BLADE MEDIUM 9MM X 25MM

## (undated) DEVICE — BLADE NARROW SH TPR 18X5.5MM

## (undated) DEVICE — APPLICATOR CHLORAPREP ORN 26ML

## (undated) DEVICE — SEE MEDLINE ITEM 152487

## (undated) DEVICE — SUT 4-0 VICRYL / SH

## (undated) DEVICE — GAUZE SPONGE 4X4 12PLY

## (undated) DEVICE — Device

## (undated) DEVICE — OSTEOTOME SURG TRITOME 3 EDG

## (undated) DEVICE — BANDAGE MATRIX HK LOOP 4IN 5YD

## (undated) DEVICE — BANDAGE ROLL COTTN 4.5INX4.1YD

## (undated) DEVICE — DRAPE STERI INSTRUMENT 1018

## (undated) DEVICE — SLEEVE SCD EXPRESS CALF MEDIUM

## (undated) DEVICE — SUT ETHILON 3-0 PS2 18 BLK

## (undated) DEVICE — BLADE AGGR MED NARROW 18X5.5MM

## (undated) DEVICE — BLADE SAW LAPIPLASTY 40X11MM

## (undated) DEVICE — SPONGE SUPER KERLIX 6X6.75IN

## (undated) DEVICE — SET INSTR MENISCUS MENDER II

## (undated) DEVICE — DRESSING GAUZE XEROFORM 5X9

## (undated) DEVICE — BLADE SURG #15 CARBON STEEL

## (undated) DEVICE — TOURNIQUET SB QC DP 34X4IN

## (undated) DEVICE — SUT MCRYL PLUS 4-0 PS2 27IN

## (undated) DEVICE — CUTTER AGGRESSIVE PLUS 3.5MM

## (undated) DEVICE — ELECTRODE REM PLYHSV RETURN 9

## (undated) DEVICE — NDL SAFETY 21G X 1 1/2 ECLPSE

## (undated) DEVICE — CONTAINER SPECIMEN STRL 4OZ

## (undated) DEVICE — BOOT SHORT 1PC LOW PROF LRG

## (undated) DEVICE — MANIFOLD 4 PORT

## (undated) DEVICE — GLOVE SURG ULTRA TOUCH 8

## (undated) DEVICE — NDL SPINAL 20GX3.5 HUB

## (undated) DEVICE — NDL ECLIPSE SAF REG 25GX1.5IN

## (undated) DEVICE — COVER LIGHT HANDLE 80/CA

## (undated) DEVICE — GLOVE SURG ULTRA TOUCH 7.5

## (undated) DEVICE — SEE MEDLINE ITEM 146231

## (undated) DEVICE — SYS LABEL CORRECT MED

## (undated) DEVICE — TUBING FLOSTEADY ARTHROSCOPY

## (undated) DEVICE — BANDAGE ESMARK 6X12

## (undated) DEVICE — STRAP OR TABLE 5IN X 72IN

## (undated) DEVICE — SEE MEDLINE ITEM 146271

## (undated) DEVICE — BLADE LONG 31.0MM X 9.0MM

## (undated) DEVICE — PADDING CAST SYN STRL 4INX4YD

## (undated) DEVICE — SPONGE COTTON TRAY 4X4IN

## (undated) DEVICE — UNDERGLOVES BIOGEL PI SIZE 7.5

## (undated) DEVICE — SPONGE GAUZE 16PLY 4X4

## (undated) DEVICE — SYR 10CC LUER LOCK

## (undated) DEVICE — DRAPE STERI U-SHAPED 47X51IN

## (undated) DEVICE — SKINMARKER W/RULER DEVON

## (undated) DEVICE — NDL BOX COUNTER

## (undated) DEVICE — PAD CAST SPECIALIST STRL 6

## (undated) DEVICE — DRESSING N ADH OIL EMUL 3X3

## (undated) DEVICE — CATH KIT FOLEY 18FR W/URIN